# Patient Record
Sex: FEMALE | Race: OTHER | Employment: FULL TIME | URBAN - METROPOLITAN AREA
[De-identification: names, ages, dates, MRNs, and addresses within clinical notes are randomized per-mention and may not be internally consistent; named-entity substitution may affect disease eponyms.]

---

## 2018-04-23 ENCOUNTER — HOSPITAL ENCOUNTER (EMERGENCY)
Facility: CLINIC | Age: 23
Discharge: HOME | End: 2018-04-23
Attending: EMERGENCY MEDICINE
Payer: COMMERCIAL

## 2018-04-23 VITALS
HEART RATE: 80 BPM | HEIGHT: 64 IN | TEMPERATURE: 98.3 F | BODY MASS INDEX: 38.41 KG/M2 | OXYGEN SATURATION: 98 % | DIASTOLIC BLOOD PRESSURE: 82 MMHG | RESPIRATION RATE: 16 BRPM | SYSTOLIC BLOOD PRESSURE: 116 MMHG | WEIGHT: 225 LBS

## 2018-04-23 DIAGNOSIS — R05.9 COUGH: Primary | ICD-10-CM

## 2018-04-23 DIAGNOSIS — J45.21 MILD INTERMITTENT ASTHMA WITH EXACERBATION: ICD-10-CM

## 2018-04-23 PROCEDURE — 99284 EMERGENCY DEPT VISIT MOD MDM: CPT

## 2018-04-23 RX ORDER — PREDNISONE 10 MG/1
TABLET ORAL
Qty: 20 TABLET | Refills: 0 | Status: SHIPPED | OUTPATIENT
Start: 2018-04-23 | End: 2018-05-03

## 2018-04-23 RX ORDER — ALBUTEROL SULFATE 90 UG/1
2 AEROSOL, METERED RESPIRATORY (INHALATION) EVERY 4 HOURS PRN
Qty: 1 INHALER | Refills: 0 | Status: SHIPPED | OUTPATIENT
Start: 2018-04-23

## 2018-04-23 RX ORDER — GUAIFENESIN 600 MG/1
600 TABLET, EXTENDED RELEASE ORAL 2 TIMES DAILY
Qty: 20 TABLET | Refills: 0 | Status: SHIPPED | OUTPATIENT
Start: 2018-04-23 | End: 2018-11-15 | Stop reason: ALTCHOICE

## 2018-04-23 ASSESSMENT — PAIN DESCRIPTION - PAIN TYPE: TYPE: ACUTE PAIN

## 2018-04-23 ASSESSMENT — PAIN DESCRIPTION - DESCRIPTORS: DESCRIPTORS: TIGHTNESS

## 2018-04-23 ASSESSMENT — PAIN DESCRIPTION - FREQUENCY: FREQUENCY: INTERMITTENT

## 2018-04-23 ASSESSMENT — PAIN DESCRIPTION - ORIENTATION: ORIENTATION: RIGHT

## 2018-04-23 ASSESSMENT — PAIN SCALES - GENERAL: PAINLEVEL_OUTOF10: 8

## 2018-11-15 ENCOUNTER — OFFICE VISIT (OUTPATIENT)
Dept: OBGYN CLINIC | Age: 23
End: 2018-11-15
Payer: COMMERCIAL

## 2018-11-15 ENCOUNTER — HOSPITAL ENCOUNTER (OUTPATIENT)
Age: 23
Setting detail: SPECIMEN
Discharge: HOME | End: 2018-11-15
Payer: COMMERCIAL

## 2018-11-15 VITALS
DIASTOLIC BLOOD PRESSURE: 78 MMHG | BODY MASS INDEX: 47.33 KG/M2 | SYSTOLIC BLOOD PRESSURE: 112 MMHG | HEIGHT: 64 IN | WEIGHT: 277.2 LBS

## 2018-11-15 DIAGNOSIS — Z32.01 POSITIVE PREGNANCY TEST: ICD-10-CM

## 2018-11-15 DIAGNOSIS — Z32.01 POSITIVE PREGNANCY TEST: Primary | ICD-10-CM

## 2018-11-15 DIAGNOSIS — N89.8 VAGINAL IRRITATION: ICD-10-CM

## 2018-11-15 DIAGNOSIS — O30.001 TWIN GESTATION IN FIRST TRIMESTER, UNSPECIFIED MULTIPLE GESTATION TYPE: ICD-10-CM

## 2018-11-15 LAB
ABO/RH: NORMAL
ABSOLUTE EOS #: 0.09 K/UL (ref 0–0.44)
ABSOLUTE IMMATURE GRANULOCYTE: <0.03 K/UL (ref 0–0.3)
ABSOLUTE LYMPH #: 1.65 K/UL (ref 1.1–3.7)
ABSOLUTE MONO #: 0.47 K/UL (ref 0.1–1.2)
ANTIBODY SCREEN: NEGATIVE
BASOPHILS # BLD: 0 % (ref 0–2)
BASOPHILS ABSOLUTE: <0.03 K/UL (ref 0–0.2)
CHLAMYDIA CULTURE: NEGATIVE
CULTURE, GONORRHOEAE: NEGATIVE
DIFFERENTIAL TYPE: ABNORMAL
DIRECT EXAM: NORMAL
EOSINOPHILS RELATIVE PERCENT: 1 % (ref 1–4)
HCT VFR BLD CALC: 40 % (ref 36.3–47.1)
HEMOGLOBIN: 13 G/DL (ref 11.9–15.1)
HEPATITIS B SURFACE ANTIGEN: NONREACTIVE
HISTORY CHECK: NORMAL
HIV AG/AB: NONREACTIVE
IMMATURE GRANULOCYTES: 0 %
LYMPHOCYTES # BLD: 22 % (ref 24–43)
Lab: NORMAL
MCH RBC QN AUTO: 28.4 PG (ref 25.2–33.5)
MCHC RBC AUTO-ENTMCNC: 32.5 G/DL (ref 28.4–34.8)
MCV RBC AUTO: 87.3 FL (ref 82.6–102.9)
MONOCYTES # BLD: 6 % (ref 3–12)
NRBC AUTOMATED: 0 PER 100 WBC
PDW BLD-RTO: 13.7 % (ref 11.8–14.4)
PLATELET # BLD: 253 K/UL (ref 138–453)
PLATELET ESTIMATE: ABNORMAL
PMV BLD AUTO: 11.4 FL (ref 8.1–13.5)
RBC # BLD: 4.58 M/UL (ref 3.95–5.11)
RBC # BLD: ABNORMAL 10*6/UL
RUBV IGG SER QL: >500 IU/ML
SEG NEUTROPHILS: 71 % (ref 36–65)
SEGMENTED NEUTROPHILS ABSOLUTE COUNT: 5.15 K/UL (ref 1.5–8.1)
SPECIMEN DESCRIPTION: NORMAL
STATUS: NORMAL
T. PALLIDUM, IGG: NONREACTIVE
WBC # BLD: 7.4 K/UL (ref 3.5–11.3)
WBC # BLD: ABNORMAL 10*3/UL

## 2018-11-15 PROCEDURE — G8484 FLU IMMUNIZE NO ADMIN: HCPCS | Performed by: NURSE PRACTITIONER

## 2018-11-15 PROCEDURE — 1036F TOBACCO NON-USER: CPT | Performed by: NURSE PRACTITIONER

## 2018-11-15 PROCEDURE — 99203 OFFICE O/P NEW LOW 30 MIN: CPT | Performed by: NURSE PRACTITIONER

## 2018-11-15 PROCEDURE — G8427 DOCREV CUR MEDS BY ELIG CLIN: HCPCS | Performed by: NURSE PRACTITIONER

## 2018-11-15 PROCEDURE — G8417 CALC BMI ABV UP PARAM F/U: HCPCS | Performed by: NURSE PRACTITIONER

## 2018-11-15 RX ORDER — VALACYCLOVIR HYDROCHLORIDE 500 MG/1
500 TABLET, FILM COATED ORAL 2 TIMES DAILY
Qty: 20 TABLET | Refills: 0 | Status: SHIPPED | OUTPATIENT
Start: 2018-11-15 | End: 2018-12-04 | Stop reason: SDUPTHER

## 2018-11-15 ASSESSMENT — ENCOUNTER SYMPTOMS
SHORTNESS OF BREATH: 0
BACK PAIN: 0
ABDOMINAL PAIN: 0
CONSTIPATION: 0
ABDOMINAL DISTENTION: 0
COUGH: 0
RECTAL PAIN: 1
DIARRHEA: 0

## 2018-11-15 NOTE — PROGRESS NOTES
She reports that she does not use drugs. Review of Systems   Constitutional: Negative for appetite change and fatigue. HENT: Negative for congestion and hearing loss. Eyes: Negative for visual disturbance. Respiratory: Negative for cough and shortness of breath. Cardiovascular: Negative for chest pain and palpitations. Gastrointestinal: Positive for rectal pain (hemorrhoids). Negative for abdominal distention, abdominal pain, constipation and diarrhea. Genitourinary: Negative for flank pain, frequency, menstrual problem, pelvic pain and vaginal discharge. Musculoskeletal: Negative for back pain. Skin: Positive for rash (buttocks). Neurological: Negative for syncope and headaches. Psychiatric/Behavioral: Negative for behavioral problems. Physical exam:Ht 5' 4\" (1.626 m)   Wt 277 lb 3.2 oz (125.7 kg)   LMP 09/14/2018   Breastfeeding?  No   BMI 47.58 kg/m²   General Appearance: alert and oriented to person,place and time, well developed and well- nourished, in no acute distress  Skin: warm and dry, no rash or erythema  Head: normocephalic and atraumatic  Eyes: extraocular eye movements intact, conjunctivae normal  ENT:  external ear and ear canal normal bilaterally, nose without deformity, nasal mucosa normal   Neck: supple and non-tender without mass, no thyromegaly or thyroid nodules, no cervical lymphadenopathy  Pulmonary/Chest: clear to auscultation bilaterally- no wheezes, rales or rhonchi, normal air movement, no respiratory distress  Cardiovascular: normal rate, regular rhythm, normal S1 and S2, no murmurs, rubs, clicks, orgallops, distal pulses intact, no carotid bruits  Abdomen: soft, non-tender, non-distended, normal bowel sounds, no masses or organomegaly  Extremities: no cyanosis, clubbing or edema  Musculoskeletal: normal rangeof motion, no joint swelling, deformity or tenderness  Neurologic: reflexes normal and symmetric, no cranial nerve deficit, gait, coordination and

## 2018-11-15 NOTE — PATIENT INSTRUCTIONS
Patient Education   Patient Education        Learning About When to Call Your Doctor During Pregnancy (Up to 20 Weeks)  Your Care Instructions    It's common to have concerns about what might be a problem during pregnancy. Although most pregnant women don't have any serious problems, it's important to know when to call your doctor if you have certain symptoms. These are general suggestions. Your doctor may give you some more information about when to call. When to call your doctor (up to 20 weeks)  Call 911 anytime you think you may need emergency care. For example, call if:  · You passed out (lost consciousness). Call your doctor now or seek immediate medical care if:  · You have a fever. · You have vaginal bleeding. · You are dizzy or lightheaded, or you feel like you may faint. · You have symptoms of a urinary tract infection. These may include:  ? Pain or burning when you urinate. ? A frequent need to urinate without being able to pass much urine. ? Pain in the flank, which is just below the rib cage and above the waist on either side of the back. ? Blood in your urine. · You have belly pain. · You think you are having contractions. · You have a sudden release of fluid from your vagina. Watch closely for changes in your health, and be sure to contact your doctor if:  · You have vaginal discharge that smells bad. · You have other concerns about your pregnancy. Follow-up care is a key part of your treatment and safety. Be sure to make and go to all appointments, and call your doctor if you are having problems. It's also a good idea to know your test results and keep a list of the medicines you take. Where can you learn more? Go to https://[email or web address].[email or web address] org and sign in to your [employer] account. Enter T336 in the [employer] box to learn more about \"Learning About When to Call Your Doctor During Pregnancy (Up to 20 Weeks). \"     If you do not have an account, please click on the \"Sign Up Now\" link. Current as of: November 21, 2017  Content Version: 11.8  © 2006-2018 [employer]. Care instructions adapted under license by [facility] ([facility]). If you have questions about a medical condition or this instruction, always ask your healthcare professional. [address] any warranty or liability for your use of this information. Weeks 10 to 14 of Your Pregnancy: Care Instructions  Your Care Instructions    By weeks 10 to 14 of your pregnancy, the placenta has formed inside your uterus. It is possible to hear your baby's heartbeat with a special ultrasound device. Your baby's eyes can and do move. The arms and legs can bend. This is a good time to think about testing for birth defects. There are two types of tests: screening and diagnostic. Screening tests show the chance that a baby has a certain birth defect. They can't tell you for sure that your baby has a problem. Diagnostic tests show if a baby has a certain birth defect. It's your choice whether to have these tests. You and your partner can talk to your doctor or midwife about birth defects tests. Follow-up care is a key part of your treatment and safety. Be sure to make and go to all appointments, and call your doctor if you are having problems. It's also a good idea to know your test results and keep a list of the medicines you take. How can you care for yourself at home? Decide about tests  · You can have screening tests and diagnostic tests to check for birth defects. The decision to have a test for birth defects is personal. Think about your age, your chance of passing on a family disease, your need to know about any problems, and what you might do after you have the test results. ? Triple or quadruple (quad) blood tests. These screening tests can be done between 15 and 20 weeks of pregnancy. They check the amounts of three or four substances in your blood.  The doctor looks at these

## 2018-11-16 LAB
C TRACH DNA GENITAL QL NAA+PROBE: NEGATIVE
N. GONORRHOEAE DNA: NEGATIVE

## 2018-11-17 LAB
CULTURE: ABNORMAL
Lab: ABNORMAL
SPECIMEN DESCRIPTION: ABNORMAL
STATUS: ABNORMAL

## 2018-11-19 ENCOUNTER — TELEPHONE (OUTPATIENT)
Dept: OBGYN CLINIC | Age: 23
End: 2018-11-19

## 2018-11-19 PROBLEM — B00.9 HSV-2 (HERPES SIMPLEX VIRUS 2) INFECTION: Status: ACTIVE | Noted: 2018-11-19

## 2018-11-19 NOTE — TELEPHONE ENCOUNTER
patient calling back for test results.  She would [facility] herpes [facility] results    Please call her at [phone]

## 2018-11-20 ENCOUNTER — TELEPHONE (OUTPATIENT)
Dept: OBGYN CLINIC | Age: 23
End: 2018-11-20

## 2018-11-20 LAB
HERPES SIMPLEX VIRUS 1 IGG: 0.15
HERPES SIMPLEX VIRUS 2 IGG: 0.08
HERPES TYPE 1/2 IGM COMBINED: 2.87

## 2018-11-21 LAB — CYSTIC FIBROSIS: NORMAL

## 2018-12-03 LAB — CYTOLOGY REPORT: NORMAL

## 2018-12-04 ENCOUNTER — HOSPITAL ENCOUNTER (OUTPATIENT)
Age: 23
Setting detail: SPECIMEN
Discharge: HOME | End: 2018-12-04
Payer: COMMERCIAL

## 2018-12-04 ENCOUNTER — PROCEDURE VISIT (OUTPATIENT)
Dept: OBGYN CLINIC | Age: 23
End: 2018-12-04
Payer: COMMERCIAL

## 2018-12-04 ENCOUNTER — INITIAL PRENATAL (OUTPATIENT)
Dept: OBGYN CLINIC | Age: 23
End: 2018-12-04
Payer: COMMERCIAL

## 2018-12-04 VITALS
DIASTOLIC BLOOD PRESSURE: 78 MMHG | BODY MASS INDEX: 46.72 KG/M2 | WEIGHT: 272.2 LBS | HEART RATE: 68 BPM | SYSTOLIC BLOOD PRESSURE: 108 MMHG

## 2018-12-04 DIAGNOSIS — Z36.89 ENCOUNTER TO ESTABLISH GESTATIONAL AGE USING ULTRASOUND: ICD-10-CM

## 2018-12-04 DIAGNOSIS — O30.041 DICHORIONIC DIAMNIOTIC TWIN PREGNANCY IN FIRST TRIMESTER: Primary | ICD-10-CM

## 2018-12-04 DIAGNOSIS — O30.041 DICHORIONIC DIAMNIOTIC TWIN PREGNANCY IN FIRST TRIMESTER: ICD-10-CM

## 2018-12-04 DIAGNOSIS — Z32.01 POSITIVE PREGNANCY TEST: ICD-10-CM

## 2018-12-04 DIAGNOSIS — Z3A.11 11 WEEKS GESTATION OF PREGNANCY: ICD-10-CM

## 2018-12-04 LAB
-: ABNORMAL
AMORPHOUS: ABNORMAL
AMPHETAMINE SCREEN URINE: NEGATIVE
BACTERIA: ABNORMAL
BARBITURATE SCREEN URINE: NEGATIVE
BENZODIAZEPINE SCREEN, URINE: NEGATIVE
BILIRUBIN URINE: NEGATIVE
BUPRENORPHINE URINE: NORMAL
CANNABINOID SCREEN URINE: NEGATIVE
CASTS UA: ABNORMAL /LPF (ref 0–2)
COCAINE METABOLITE, URINE: NEGATIVE
COLOR: ABNORMAL
COMMENT UA: ABNORMAL
CRL: NORMAL CM
CRL: NORMAL CM
CRYSTALS, UA: ABNORMAL /HPF
EPITHELIAL CELLS UA: ABNORMAL /HPF (ref 0–5)
GLUCOSE ADMINISTRATION: NORMAL
GLUCOSE TOLERANCE SCREEN 50G: 104 MG/DL (ref 70–135)
GLUCOSE URINE: NEGATIVE
KETONES, URINE: ABNORMAL
LEUKOCYTE ESTERASE, URINE: ABNORMAL
MDMA URINE: NORMAL
METHADONE SCREEN, URINE: NEGATIVE
METHAMPHETAMINE, URINE: NORMAL
MUCUS: ABNORMAL
NITRITE, URINE: NEGATIVE
OPIATES, URINE: NEGATIVE
OTHER OBSERVATIONS UA: ABNORMAL
OXYCODONE SCREEN URINE: NEGATIVE
PH UA: 6 (ref 5–8)
PHENCYCLIDINE, URINE: NEGATIVE
PROPOXYPHENE, URINE: NORMAL
PROTEIN UA: ABNORMAL
RBC UA: ABNORMAL /HPF (ref 0–2)
RENAL EPITHELIAL, UA: ABNORMAL /HPF
SAC DIAMETER: NORMAL CM
SAC DIAMETER: NORMAL CM
SPECIFIC GRAVITY UA: 1.03 (ref 1–1.03)
TEST INFORMATION: NORMAL
TRICHOMONAS: ABNORMAL
TRICYCLIC ANTIDEPRESSANTS, UR: NORMAL
TURBIDITY: ABNORMAL
URINE HGB: NEGATIVE
UROBILINOGEN, URINE: NORMAL
WBC UA: ABNORMAL /HPF (ref 0–5)
YEAST: ABNORMAL

## 2018-12-04 PROCEDURE — H1000 PRENATAL CARE ATRISK ASSESSM: HCPCS | Performed by: NURSE PRACTITIONER

## 2018-12-04 PROCEDURE — G8417 CALC BMI ABV UP PARAM F/U: HCPCS | Performed by: NURSE PRACTITIONER

## 2018-12-04 PROCEDURE — G8484 FLU IMMUNIZE NO ADMIN: HCPCS | Performed by: NURSE PRACTITIONER

## 2018-12-04 PROCEDURE — 99213 OFFICE O/P EST LOW 20 MIN: CPT | Performed by: NURSE PRACTITIONER

## 2018-12-04 PROCEDURE — 1036F TOBACCO NON-USER: CPT | Performed by: NURSE PRACTITIONER

## 2018-12-04 PROCEDURE — G8427 DOCREV CUR MEDS BY ELIG CLIN: HCPCS | Performed by: NURSE PRACTITIONER

## 2018-12-04 PROCEDURE — 76802 OB US < 14 WKS ADDL FETUS: CPT | Performed by: OBSTETRICS & GYNECOLOGY

## 2018-12-04 PROCEDURE — 76801 OB US < 14 WKS SINGLE FETUS: CPT | Performed by: OBSTETRICS & GYNECOLOGY

## 2018-12-04 RX ORDER — VALACYCLOVIR HYDROCHLORIDE 500 MG/1
500 TABLET, FILM COATED ORAL 2 TIMES DAILY
Qty: 20 TABLET | Refills: 8 | Status: SHIPPED | OUTPATIENT
Start: 2018-12-04 | End: 2018-12-14

## 2018-12-04 NOTE — PATIENT INSTRUCTIONS
this information. Learning About When to Call Your Doctor During Pregnancy (Up to 20 Weeks)  Your Care Instructions    It's common to have concerns about what might be a problem during pregnancy. Although most pregnant women don't have any serious problems, it's important to know when to call your doctor if you have certain symptoms. These are general suggestions. Your doctor may give you some more information about when to call. When to call your doctor (up to 20 weeks)  Call 911 anytime you think you may need emergency care. For example, call if:  · You passed out (lost consciousness). Call your doctor now or seek immediate medical care if:  · You have a fever. · You have vaginal bleeding. · You are dizzy or lightheaded, or you feel like you may faint. · You have symptoms of a urinary tract infection. These may include:  ? Pain or burning when you urinate. ? A frequent need to urinate without being able to pass much urine. ? Pain in the flank, which is just below the rib cage and above the waist on either side of the back. ? Blood in your urine. · You have belly pain. · You think you are having contractions. · You have a sudden release of fluid from your vagina. Watch closely for changes in your health, and be sure to contact your doctor if:  · You have vaginal discharge that smells bad. · You have other concerns about your pregnancy. Follow-up care is a key part of your treatment and safety. Be sure to make and go to all appointments, and call your doctor if you are having problems. It's also a good idea to know your test results and keep a list of the medicines you take. Where can you learn more? Go to [email or web address] org and sign in to your [employer] account. Enter B088 in the [facility] box to learn more about \"Learning About When to Call Your Doctor During Pregnancy (Up to 20 Weeks). \"     If you do not have an account, please click on the \"Sign Up

## 2018-12-04 NOTE — PROGRESS NOTES
FDA.  This test is used for clinical purposes. Pursuant to  the requirements of CLIA '88, this laboratory has established and  verified the test's accuracy and precision. It should not be  regarded  as investigational or for research. This laboratory is certified  under the [address] (CLIA  '88) as qualified to perform high complexity clinical laboratory  testing. Methodology:  Samples are tested for the presence of wild type or  mutant gene sequences in the CFTR gene by the [employer] OncologyAGe Cystic  Fibrosis 60 Kit. It is comprised of a single multiplex PCR reaction  that is then used in two separate Allele Specific Primer Extension  reactions, which are followed by two separate bead hybridization  reactions. The LiveStube Assay screens for 60 CFTR mutations,  including the standard 23 mutation core panel recommended by the  [employer] of Obstetricians and Gynecologists (ACOG) and the  [address] ([facility]):  , , G542X,  G85E, R117H, 621+1G>T, 711+1G>T, C9589S, R334W, R347P, A455E,  1717-1G>A, R560T, R553X, G551D, 1898+1G>A, 2184delA, 2789+5G>A,  3120+1G>A, E1119H, 3659delC, 3849+10kbC>T, W1 282X, 1078delT, 394delTT,  Y122X, R347H, V520F, A559T, S549N, S549R, 1898+5G>T, 2183AA>G,  2307insA, A3606G, B0205D, F4938A, 3876delA, 3905insT, CFTRdele2,3,  E60X, R75X, 406-1G>A, G178R, [id number], L206W, 935delA, G330X, Q493X,  Q890X, 1677delTA, 7685upj0>A, 2143delT, K710X, K6108R, 9955jxr1,  W4975X, [id number], O7180W, 3791delC and variants 5T/7T/9T, F508C, I507V,  I506V. For samples positive for R117H, the IVS-8 Poly T variant is  analyzed.            **Electronically Signed Out**  [facility] Time: 11/15/18 12:00 AM   Result Value Ref Range    Culture, Gonorrhoeae NEGATIVE    Chlamydia Culture    Collection Time: 11/15/18 12:00 AM   Result Value Ref Range    CHLAMYDIA CULTURE NEGATIVE    GYN Cytology Collection Time: 11/15/18  9:48 AM   Result Value Ref Range    Cytology Report       (NOTE)  [id number]  [facility]  ANATOMIC PATHOLOGY  [address] [place] [address],  [address] [facility], [address]  [phone]  Fax: [phone]  GYNECOLOGIC CYTOLOGY REPORT    Patient Name: Michelle Chavez  MR#: [id number]  Specimen #[id number]  Source:  1: Cervical material, (ThinPrep vial, Imaging-assisted review)    Clinical History  Pregnant: positive test  High risk HPV DNA testing is requested if the diagnosis is abnormal    INTERPRETATION    Cervical material, (ThinPrep vial, Imaging-assisted review):  Specimen Adequacy:      Satisfactory for evaluation.      - Endocervical/transformation zone component present. Descriptive Diagnosis:      Negative for intraepithelial lesion or malignancy. Reactive cellular changes associated with inflammation. Fungal organisms morphologically consistent with Candida species.         Cytotechnologist:   DESIRE Chao M.D.  **Electronically Signed Out**         [facility]/12/3/2018     Type and screen    Collection Time: 11/15/18 11:30 AM   Result Value Ref Range    ABO/Rh A POSITIVE     Antibody Screen NEGATIVE     History Check A    CBC Auto Differential    Collection Time: 11/15/18 11:31 AM   Result Value Ref Range    WBC 7.4 3.5 - 11.3 k/uL    RBC 4.58 3.95 - 5.11 m/uL    Hemoglobin 13.0 11.9 - 15.1 g/dL    Hematocrit 40.0 36.3 - 47.1 %    MCV 87.3 82.6 - 102.9 fL    MCH 28.4 25.2 - 33.5 pg    MCHC 32.5 28.4 - 34.8 g/dL    RDW 13.7 11.8 - 14.4 %    Platelets 938 400 - 974 k/uL    MPV 11.4 8.1 - 13.5 fL    NRBC Automated 0.0 0.0 per 100 WBC    Differential Type NOT REPORTED     Seg Neutrophils 71 (H) 36 - 65 %    Lymphocytes 22 (L) 24 - 43 %    Monocytes 6 3 - 12 %    Eosinophils % 1 1 - 4 %    Basophils 0 0 - 2 %    Immature Granulocytes 0 0 %    Segs Absolute 5.15 1.50 - 8.10 k/uL    Absolute Lymph # 1.65 1.10 - 3.70 k/uL    Absolute Mono # 0.47 0.10 -

## 2018-12-05 LAB
CULTURE: ABNORMAL
Lab: ABNORMAL
ORGANISM: ABNORMAL
SPECIMEN DESCRIPTION: ABNORMAL
STATUS: ABNORMAL

## 2018-12-06 RX ORDER — NITROFURANTOIN 25; 75 MG/1; MG/1
100 CAPSULE ORAL 2 TIMES DAILY
Qty: 20 CAPSULE | Refills: 0 | Status: SHIPPED | OUTPATIENT
Start: 2018-12-06 | End: 2018-12-16

## 2018-12-11 ENCOUNTER — ROUTINE PRENATAL (OUTPATIENT)
Dept: PERINATAL CARE | Age: 23
End: 2018-12-11
Payer: COMMERCIAL

## 2018-12-11 VITALS
WEIGHT: 272 LBS | BODY MASS INDEX: 46.44 KG/M2 | RESPIRATION RATE: 16 BRPM | SYSTOLIC BLOOD PRESSURE: 110 MMHG | TEMPERATURE: 98.7 F | DIASTOLIC BLOOD PRESSURE: 78 MMHG | HEART RATE: 72 BPM | HEIGHT: 64 IN

## 2018-12-11 DIAGNOSIS — O99.511 ASTHMA AFFECTING PREGNANCY IN FIRST TRIMESTER: ICD-10-CM

## 2018-12-11 DIAGNOSIS — J45.909 ASTHMA AFFECTING PREGNANCY IN FIRST TRIMESTER: ICD-10-CM

## 2018-12-11 DIAGNOSIS — O30.041 DICHORIONIC DIAMNIOTIC TWIN PREGNANCY IN FIRST TRIMESTER: Primary | ICD-10-CM

## 2018-12-11 DIAGNOSIS — Z36.9 FIRST TRIMESTER SCREENING: ICD-10-CM

## 2018-12-11 DIAGNOSIS — Z3A.12 12 WEEKS GESTATION OF PREGNANCY: ICD-10-CM

## 2018-12-11 DIAGNOSIS — O99.211 OBESITY AFFECTING PREGNANCY IN FIRST TRIMESTER: ICD-10-CM

## 2018-12-11 PROCEDURE — 76802 OB US < 14 WKS ADDL FETUS: CPT | Performed by: OBSTETRICS & GYNECOLOGY

## 2018-12-11 PROCEDURE — G8428 CUR MEDS NOT DOCUMENT: HCPCS | Performed by: OBSTETRICS & GYNECOLOGY

## 2018-12-11 PROCEDURE — 99242 OFF/OP CONSLTJ NEW/EST SF 20: CPT | Performed by: OBSTETRICS & GYNECOLOGY

## 2018-12-11 PROCEDURE — 76801 OB US < 14 WKS SINGLE FETUS: CPT | Performed by: OBSTETRICS & GYNECOLOGY

## 2018-12-11 PROCEDURE — 76814 OB US NUCHAL MEAS ADD-ON: CPT | Performed by: OBSTETRICS & GYNECOLOGY

## 2018-12-11 PROCEDURE — 76813 OB US NUCHAL MEAS 1 GEST: CPT | Performed by: OBSTETRICS & GYNECOLOGY

## 2018-12-11 PROCEDURE — G8417 CALC BMI ABV UP PARAM F/U: HCPCS | Performed by: OBSTETRICS & GYNECOLOGY

## 2018-12-11 PROCEDURE — G8484 FLU IMMUNIZE NO ADMIN: HCPCS | Performed by: OBSTETRICS & GYNECOLOGY

## 2018-12-13 PROBLEM — O23.41 URINARY TRACT INFECTION IN MOTHER DURING FIRST TRIMESTER OF PREGNANCY: Status: ACTIVE | Noted: 2018-12-13

## 2018-12-13 PROBLEM — O30.041 DICHORIONIC DIAMNIOTIC TWIN PREGNANCY IN FIRST TRIMESTER: Status: ACTIVE | Noted: 2018-11-15

## 2018-12-13 PROBLEM — O30.041 DICHORIONIC DIAMNIOTIC TWIN PREGNANCY IN FIRST TRIMESTER: Status: RESOLVED | Noted: 2018-11-15 | Resolved: 2018-12-13

## 2019-01-03 ENCOUNTER — TELEPHONE (OUTPATIENT)
Dept: OBGYN CLINIC | Age: 24
End: 2019-01-03

## 2019-01-11 ENCOUNTER — ROUTINE PRENATAL (OUTPATIENT)
Dept: OBGYN CLINIC | Age: 24
End: 2019-01-11
Payer: MEDICARE

## 2019-01-11 VITALS
HEART RATE: 74 BPM | DIASTOLIC BLOOD PRESSURE: 64 MMHG | WEIGHT: 273.8 LBS | SYSTOLIC BLOOD PRESSURE: 122 MMHG | BODY MASS INDEX: 47 KG/M2

## 2019-01-11 DIAGNOSIS — O98.511 HSV-2 INFECTION COMPLICATING PREGNANCY, FIRST TRIMESTER: ICD-10-CM

## 2019-01-11 DIAGNOSIS — O09.92 SUPERVISION OF HIGH RISK PREGNANCY IN SECOND TRIMESTER: ICD-10-CM

## 2019-01-11 DIAGNOSIS — O30.041 DICHORIONIC DIAMNIOTIC TWIN PREGNANCY IN FIRST TRIMESTER: ICD-10-CM

## 2019-01-11 DIAGNOSIS — B00.9 HSV-2 INFECTION COMPLICATING PREGNANCY, FIRST TRIMESTER: ICD-10-CM

## 2019-01-11 DIAGNOSIS — Z3A.17 17 WEEKS GESTATION OF PREGNANCY: Primary | ICD-10-CM

## 2019-01-11 PROCEDURE — 1036F TOBACCO NON-USER: CPT | Performed by: NURSE PRACTITIONER

## 2019-01-11 PROCEDURE — 99213 OFFICE O/P EST LOW 20 MIN: CPT | Performed by: NURSE PRACTITIONER

## 2019-01-11 PROCEDURE — G8484 FLU IMMUNIZE NO ADMIN: HCPCS | Performed by: NURSE PRACTITIONER

## 2019-01-11 PROCEDURE — G8427 DOCREV CUR MEDS BY ELIG CLIN: HCPCS | Performed by: NURSE PRACTITIONER

## 2019-01-11 PROCEDURE — G8417 CALC BMI ABV UP PARAM F/U: HCPCS | Performed by: NURSE PRACTITIONER

## 2019-01-14 ENCOUNTER — ROUTINE PRENATAL (OUTPATIENT)
Dept: PERINATAL CARE | Age: 24
End: 2019-01-14
Payer: COMMERCIAL

## 2019-01-14 VITALS
DIASTOLIC BLOOD PRESSURE: 76 MMHG | BODY MASS INDEX: 46.15 KG/M2 | HEIGHT: 65 IN | TEMPERATURE: 98 F | RESPIRATION RATE: 16 BRPM | HEART RATE: 69 BPM | SYSTOLIC BLOOD PRESSURE: 120 MMHG | WEIGHT: 277 LBS

## 2019-01-14 DIAGNOSIS — Z13.89 ENCOUNTER FOR ROUTINE SCREENING FOR MALFORMATION USING ULTRASONICS: ICD-10-CM

## 2019-01-14 DIAGNOSIS — O99.212 OBESITY AFFECTING PREGNANCY IN SECOND TRIMESTER: ICD-10-CM

## 2019-01-14 DIAGNOSIS — Z3A.17 17 WEEKS GESTATION OF PREGNANCY: ICD-10-CM

## 2019-01-14 DIAGNOSIS — O30.042 DICHORIONIC DIAMNIOTIC TWIN PREGNANCY IN SECOND TRIMESTER: Primary | ICD-10-CM

## 2019-01-14 DIAGNOSIS — O99.512 ASTHMA AFFECTING PREGNANCY IN SECOND TRIMESTER: ICD-10-CM

## 2019-01-14 DIAGNOSIS — J45.909 ASTHMA AFFECTING PREGNANCY IN SECOND TRIMESTER: ICD-10-CM

## 2019-01-14 PROCEDURE — 76805 OB US >/= 14 WKS SNGL FETUS: CPT | Performed by: OBSTETRICS & GYNECOLOGY

## 2019-01-14 PROCEDURE — 76817 TRANSVAGINAL US OBSTETRIC: CPT | Performed by: OBSTETRICS & GYNECOLOGY

## 2019-01-14 PROCEDURE — 76810 OB US >/= 14 WKS ADDL FETUS: CPT | Performed by: OBSTETRICS & GYNECOLOGY

## 2019-01-14 RX ORDER — VALACYCLOVIR HYDROCHLORIDE 500 MG/1
TABLET, FILM COATED ORAL
Refills: 0 | COMMUNITY
Start: 2019-01-07 | End: 2020-07-07

## 2019-02-04 ENCOUNTER — ROUTINE PRENATAL (OUTPATIENT)
Dept: PERINATAL CARE | Age: 24
End: 2019-02-04
Payer: COMMERCIAL

## 2019-02-04 ENCOUNTER — ROUTINE PRENATAL (OUTPATIENT)
Dept: OBGYN CLINIC | Age: 24
End: 2019-02-04

## 2019-02-04 VITALS
SYSTOLIC BLOOD PRESSURE: 128 MMHG | HEART RATE: 63 BPM | HEIGHT: 65 IN | OXYGEN SATURATION: 98 % | DIASTOLIC BLOOD PRESSURE: 82 MMHG | RESPIRATION RATE: 22 BRPM | BODY MASS INDEX: 45.88 KG/M2 | WEIGHT: 275.4 LBS

## 2019-02-04 VITALS
DIASTOLIC BLOOD PRESSURE: 68 MMHG | RESPIRATION RATE: 16 BRPM | WEIGHT: 276 LBS | BODY MASS INDEX: 45.98 KG/M2 | TEMPERATURE: 98.7 F | SYSTOLIC BLOOD PRESSURE: 116 MMHG | HEIGHT: 65 IN | HEART RATE: 84 BPM

## 2019-02-04 DIAGNOSIS — O09.92 SUPERVISION OF HIGH RISK PREGNANCY IN SECOND TRIMESTER: ICD-10-CM

## 2019-02-04 DIAGNOSIS — O99.512 ASTHMA AFFECTING PREGNANCY IN SECOND TRIMESTER: ICD-10-CM

## 2019-02-04 DIAGNOSIS — Z3A.20 20 WEEKS GESTATION OF PREGNANCY: ICD-10-CM

## 2019-02-04 DIAGNOSIS — O30.042 DICHORIONIC DIAMNIOTIC TWIN PREGNANCY IN SECOND TRIMESTER: ICD-10-CM

## 2019-02-04 DIAGNOSIS — O30.042 DICHORIONIC DIAMNIOTIC TWIN PREGNANCY IN SECOND TRIMESTER: Primary | ICD-10-CM

## 2019-02-04 DIAGNOSIS — J45.909 ASTHMA AFFECTING PREGNANCY IN SECOND TRIMESTER: ICD-10-CM

## 2019-02-04 DIAGNOSIS — O99.212 OBESITY AFFECTING PREGNANCY IN SECOND TRIMESTER: ICD-10-CM

## 2019-02-04 DIAGNOSIS — Z3A.20 20 WEEKS GESTATION OF PREGNANCY: Primary | ICD-10-CM

## 2019-02-04 PROBLEM — O44.02 PLACENTA PREVIA ANTEPARTUM IN SECOND TRIMESTER: Status: ACTIVE | Noted: 2019-02-04

## 2019-02-04 PROCEDURE — 76811 OB US DETAILED SNGL FETUS: CPT | Performed by: OBSTETRICS & GYNECOLOGY

## 2019-02-04 PROCEDURE — G8417 CALC BMI ABV UP PARAM F/U: HCPCS | Performed by: NURSE PRACTITIONER

## 2019-02-04 PROCEDURE — G8427 DOCREV CUR MEDS BY ELIG CLIN: HCPCS | Performed by: NURSE PRACTITIONER

## 2019-02-04 PROCEDURE — 1036F TOBACCO NON-USER: CPT | Performed by: NURSE PRACTITIONER

## 2019-02-04 PROCEDURE — 0502F SUBSEQUENT PRENATAL CARE: CPT | Performed by: NURSE PRACTITIONER

## 2019-02-04 PROCEDURE — G8484 FLU IMMUNIZE NO ADMIN: HCPCS | Performed by: NURSE PRACTITIONER

## 2019-02-04 PROCEDURE — 76812 OB US DETAILED ADDL FETUS: CPT | Performed by: OBSTETRICS & GYNECOLOGY

## 2019-02-04 PROCEDURE — 76817 TRANSVAGINAL US OBSTETRIC: CPT | Performed by: OBSTETRICS & GYNECOLOGY

## 2019-02-11 ENCOUNTER — TELEPHONE (OUTPATIENT)
Dept: OBGYN CLINIC | Age: 24
End: 2019-02-11

## 2019-02-11 DIAGNOSIS — B37.31 YEAST VAGINITIS: Primary | ICD-10-CM

## 2019-02-11 RX ORDER — FLUCONAZOLE 150 MG/1
150 TABLET ORAL ONCE
Qty: 1 TABLET | Refills: 0 | Status: SHIPPED | OUTPATIENT
Start: 2019-02-11 | End: 2019-02-11

## 2019-02-18 ENCOUNTER — ROUTINE PRENATAL (OUTPATIENT)
Dept: PERINATAL CARE | Age: 24
End: 2019-02-18
Payer: COMMERCIAL

## 2019-02-18 VITALS
RESPIRATION RATE: 16 BRPM | HEIGHT: 65 IN | TEMPERATURE: 98.6 F | SYSTOLIC BLOOD PRESSURE: 97 MMHG | BODY MASS INDEX: 45.98 KG/M2 | WEIGHT: 276 LBS | DIASTOLIC BLOOD PRESSURE: 55 MMHG | HEART RATE: 101 BPM

## 2019-02-18 DIAGNOSIS — O99.212 OBESITY AFFECTING PREGNANCY IN SECOND TRIMESTER: ICD-10-CM

## 2019-02-18 DIAGNOSIS — O99.512 ASTHMA AFFECTING PREGNANCY IN SECOND TRIMESTER: ICD-10-CM

## 2019-02-18 DIAGNOSIS — O30.042 DICHORIONIC DIAMNIOTIC TWIN PREGNANCY IN SECOND TRIMESTER: Primary | ICD-10-CM

## 2019-02-18 DIAGNOSIS — J45.909 ASTHMA AFFECTING PREGNANCY IN SECOND TRIMESTER: ICD-10-CM

## 2019-02-18 DIAGNOSIS — Z3A.22 22 WEEKS GESTATION OF PREGNANCY: ICD-10-CM

## 2019-02-18 PROCEDURE — 76815 OB US LIMITED FETUS(S): CPT | Performed by: OBSTETRICS & GYNECOLOGY

## 2019-02-18 PROCEDURE — 76817 TRANSVAGINAL US OBSTETRIC: CPT | Performed by: OBSTETRICS & GYNECOLOGY

## 2019-03-06 ENCOUNTER — ROUTINE PRENATAL (OUTPATIENT)
Dept: PERINATAL CARE | Age: 24
End: 2019-03-06
Payer: COMMERCIAL

## 2019-03-06 VITALS
HEIGHT: 65 IN | HEART RATE: 81 BPM | RESPIRATION RATE: 18 BRPM | DIASTOLIC BLOOD PRESSURE: 78 MMHG | WEIGHT: 284 LBS | BODY MASS INDEX: 47.32 KG/M2 | SYSTOLIC BLOOD PRESSURE: 131 MMHG | TEMPERATURE: 98.6 F

## 2019-03-06 DIAGNOSIS — Z36.4 ANTENATAL SCREENING FOR FETAL GROWTH RETARDATION USING ULTRASONICS: ICD-10-CM

## 2019-03-06 DIAGNOSIS — O99.512 ASTHMA AFFECTING PREGNANCY IN SECOND TRIMESTER: ICD-10-CM

## 2019-03-06 DIAGNOSIS — J45.909 ASTHMA AFFECTING PREGNANCY IN SECOND TRIMESTER: ICD-10-CM

## 2019-03-06 DIAGNOSIS — O99.212 OBESITY AFFECTING PREGNANCY IN SECOND TRIMESTER: ICD-10-CM

## 2019-03-06 DIAGNOSIS — O30.042 DICHORIONIC DIAMNIOTIC TWIN PREGNANCY IN SECOND TRIMESTER: Primary | ICD-10-CM

## 2019-03-06 DIAGNOSIS — Z3A.24 24 WEEKS GESTATION OF PREGNANCY: ICD-10-CM

## 2019-03-06 PROCEDURE — 76817 TRANSVAGINAL US OBSTETRIC: CPT | Performed by: OBSTETRICS & GYNECOLOGY

## 2019-03-06 PROCEDURE — 76816 OB US FOLLOW-UP PER FETUS: CPT | Performed by: OBSTETRICS & GYNECOLOGY

## 2019-03-07 PROBLEM — O30.041 DICHORIONIC DIAMNIOTIC TWIN PREGNANCY IN FIRST TRIMESTER: Status: RESOLVED | Noted: 2018-12-11 | Resolved: 2019-03-07

## 2019-03-13 ENCOUNTER — ROUTINE PRENATAL (OUTPATIENT)
Dept: OBGYN CLINIC | Age: 24
End: 2019-03-13

## 2019-03-13 VITALS — BODY MASS INDEX: 47.93 KG/M2 | SYSTOLIC BLOOD PRESSURE: 126 MMHG | DIASTOLIC BLOOD PRESSURE: 70 MMHG | WEIGHT: 283.6 LBS

## 2019-03-13 DIAGNOSIS — O30.042 DICHORIONIC DIAMNIOTIC TWIN PREGNANCY IN SECOND TRIMESTER: ICD-10-CM

## 2019-03-13 DIAGNOSIS — Z3A.25 25 WEEKS GESTATION OF PREGNANCY: Primary | ICD-10-CM

## 2019-03-13 DIAGNOSIS — O44.02 PLACENTA PREVIA ANTEPARTUM IN SECOND TRIMESTER: ICD-10-CM

## 2019-03-13 PROCEDURE — 1036F TOBACCO NON-USER: CPT | Performed by: OBSTETRICS & GYNECOLOGY

## 2019-03-13 PROCEDURE — 0502F SUBSEQUENT PRENATAL CARE: CPT | Performed by: OBSTETRICS & GYNECOLOGY

## 2019-03-13 PROCEDURE — G8484 FLU IMMUNIZE NO ADMIN: HCPCS | Performed by: OBSTETRICS & GYNECOLOGY

## 2019-03-13 PROCEDURE — G8427 DOCREV CUR MEDS BY ELIG CLIN: HCPCS | Performed by: OBSTETRICS & GYNECOLOGY

## 2019-03-13 PROCEDURE — G8417 CALC BMI ABV UP PARAM F/U: HCPCS | Performed by: OBSTETRICS & GYNECOLOGY

## 2019-03-20 ENCOUNTER — ROUTINE PRENATAL (OUTPATIENT)
Dept: PERINATAL CARE | Age: 24
End: 2019-03-20
Payer: COMMERCIAL

## 2019-03-20 VITALS
WEIGHT: 284 LBS | DIASTOLIC BLOOD PRESSURE: 75 MMHG | RESPIRATION RATE: 18 BRPM | BODY MASS INDEX: 47.32 KG/M2 | HEIGHT: 65 IN | TEMPERATURE: 98 F | HEART RATE: 77 BPM | SYSTOLIC BLOOD PRESSURE: 125 MMHG

## 2019-03-20 DIAGNOSIS — Z3A.26 26 WEEKS GESTATION OF PREGNANCY: ICD-10-CM

## 2019-03-20 DIAGNOSIS — J45.909 ASTHMA AFFECTING PREGNANCY IN SECOND TRIMESTER: ICD-10-CM

## 2019-03-20 DIAGNOSIS — O99.512 ASTHMA AFFECTING PREGNANCY IN SECOND TRIMESTER: ICD-10-CM

## 2019-03-20 DIAGNOSIS — O30.042 DICHORIONIC DIAMNIOTIC TWIN PREGNANCY IN SECOND TRIMESTER: Primary | ICD-10-CM

## 2019-03-20 DIAGNOSIS — O99.212 OBESITY AFFECTING PREGNANCY IN SECOND TRIMESTER: ICD-10-CM

## 2019-03-20 PROCEDURE — 76817 TRANSVAGINAL US OBSTETRIC: CPT | Performed by: OBSTETRICS & GYNECOLOGY

## 2019-03-20 PROCEDURE — 76815 OB US LIMITED FETUS(S): CPT | Performed by: OBSTETRICS & GYNECOLOGY

## 2019-03-20 RX ORDER — EPINEPHRINE 0.3 MG/.3ML
0.3 INJECTION SUBCUTANEOUS
COMMUNITY
Start: 2018-04-28

## 2019-03-27 ENCOUNTER — ROUTINE PRENATAL (OUTPATIENT)
Dept: OBGYN CLINIC | Age: 24
End: 2019-03-27

## 2019-03-27 VITALS — SYSTOLIC BLOOD PRESSURE: 130 MMHG | DIASTOLIC BLOOD PRESSURE: 70 MMHG | WEIGHT: 287 LBS | BODY MASS INDEX: 48.5 KG/M2

## 2019-03-27 DIAGNOSIS — O44.02 PLACENTA PREVIA ANTEPARTUM IN SECOND TRIMESTER: ICD-10-CM

## 2019-03-27 DIAGNOSIS — Z3A.27 27 WEEKS GESTATION OF PREGNANCY: Primary | ICD-10-CM

## 2019-03-27 PROCEDURE — 1036F TOBACCO NON-USER: CPT | Performed by: OBSTETRICS & GYNECOLOGY

## 2019-03-27 PROCEDURE — G8427 DOCREV CUR MEDS BY ELIG CLIN: HCPCS | Performed by: OBSTETRICS & GYNECOLOGY

## 2019-03-27 PROCEDURE — 0502F SUBSEQUENT PRENATAL CARE: CPT | Performed by: OBSTETRICS & GYNECOLOGY

## 2019-03-27 PROCEDURE — G8484 FLU IMMUNIZE NO ADMIN: HCPCS | Performed by: OBSTETRICS & GYNECOLOGY

## 2019-03-27 PROCEDURE — G8417 CALC BMI ABV UP PARAM F/U: HCPCS | Performed by: OBSTETRICS & GYNECOLOGY

## 2019-04-03 ENCOUNTER — ROUTINE PRENATAL (OUTPATIENT)
Dept: PERINATAL CARE | Age: 24
End: 2019-04-03
Payer: MEDICARE

## 2019-04-03 VITALS
WEIGHT: 282 LBS | HEIGHT: 65 IN | SYSTOLIC BLOOD PRESSURE: 118 MMHG | DIASTOLIC BLOOD PRESSURE: 78 MMHG | BODY MASS INDEX: 46.98 KG/M2 | RESPIRATION RATE: 18 BRPM | TEMPERATURE: 98.1 F | HEART RATE: 99 BPM

## 2019-04-03 DIAGNOSIS — Z13.89 ENCOUNTER FOR ROUTINE SCREENING FOR MALFORMATION USING ULTRASONICS: ICD-10-CM

## 2019-04-03 DIAGNOSIS — J45.909 ASTHMA AFFECTING PREGNANCY IN THIRD TRIMESTER: ICD-10-CM

## 2019-04-03 DIAGNOSIS — O99.513 ASTHMA AFFECTING PREGNANCY IN THIRD TRIMESTER: ICD-10-CM

## 2019-04-03 DIAGNOSIS — O36.5931 POOR FETAL GROWTH AFFECTING MANAGEMENT OF MOTHER IN THIRD TRIMESTER, FETUS 1 OF MULTIPLE GESTATION: ICD-10-CM

## 2019-04-03 DIAGNOSIS — Z36.4 ANTENATAL SCREENING FOR FETAL GROWTH RETARDATION USING ULTRASONICS: ICD-10-CM

## 2019-04-03 DIAGNOSIS — O30.043 DICHORIONIC DIAMNIOTIC TWIN PREGNANCY IN THIRD TRIMESTER: Primary | ICD-10-CM

## 2019-04-03 DIAGNOSIS — O99.213 OBESITY AFFECTING PREGNANCY IN THIRD TRIMESTER: ICD-10-CM

## 2019-04-03 DIAGNOSIS — Z3A.28 28 WEEKS GESTATION OF PREGNANCY: ICD-10-CM

## 2019-04-03 LAB
ABDOMINAL CIRCUMFERENCE: NORMAL CM
ABDOMINAL CIRCUMFERENCE: NORMAL CM
BIPARIETAL DIAMETER: NORMAL CM
BIPARIETAL DIAMETER: NORMAL CM
ESTIMATED FETAL WEIGHT: NORMAL GRAMS
ESTIMATED FETAL WEIGHT: NORMAL GRAMS
FEMORAL DIAMETER: NORMAL CM
FEMORAL DIAMETER: NORMAL CM
HC/AC: NORMAL
HC/AC: NORMAL
HEAD CIRCUMFERENCE: NORMAL CM
HEAD CIRCUMFERENCE: NORMAL CM

## 2019-04-03 PROCEDURE — 76819 FETAL BIOPHYS PROFIL W/O NST: CPT | Performed by: OBSTETRICS & GYNECOLOGY

## 2019-04-03 PROCEDURE — 76820 UMBILICAL ARTERY ECHO: CPT | Performed by: OBSTETRICS & GYNECOLOGY

## 2019-04-03 PROCEDURE — 76810 OB US >/= 14 WKS ADDL FETUS: CPT | Performed by: OBSTETRICS & GYNECOLOGY

## 2019-04-03 PROCEDURE — 76821 MIDDLE CEREBRAL ARTERY ECHO: CPT | Performed by: OBSTETRICS & GYNECOLOGY

## 2019-04-03 PROCEDURE — 76805 OB US >/= 14 WKS SNGL FETUS: CPT | Performed by: OBSTETRICS & GYNECOLOGY

## 2019-04-03 PROCEDURE — 76817 TRANSVAGINAL US OBSTETRIC: CPT | Performed by: OBSTETRICS & GYNECOLOGY

## 2019-04-04 ENCOUNTER — HOSPITAL ENCOUNTER (OUTPATIENT)
Age: 24
Discharge: HOME | End: 2019-04-04
Attending: OBSTETRICS & GYNECOLOGY | Admitting: OBSTETRICS & GYNECOLOGY
Payer: MEDICARE

## 2019-04-04 ENCOUNTER — TELEPHONE (OUTPATIENT)
Dept: PERINATAL CARE | Age: 24
End: 2019-04-04

## 2019-04-04 ENCOUNTER — TELEPHONE (OUTPATIENT)
Dept: OBGYN CLINIC | Age: 24
End: 2019-04-04

## 2019-04-04 VITALS — SYSTOLIC BLOOD PRESSURE: 119 MMHG | HEART RATE: 74 BPM | RESPIRATION RATE: 20 BRPM | DIASTOLIC BLOOD PRESSURE: 76 MMHG

## 2019-04-04 PROBLEM — J45.909 ASTHMA AFFECTING PREGNANCY IN FIRST TRIMESTER: Status: RESOLVED | Noted: 2018-12-11 | Resolved: 2019-04-04

## 2019-04-04 PROBLEM — O99.213 OBESITY AFFECTING PREGNANCY IN THIRD TRIMESTER: Status: ACTIVE | Noted: 2019-01-14

## 2019-04-04 PROBLEM — O44.02 PLACENTA PREVIA ANTEPARTUM IN SECOND TRIMESTER: Status: RESOLVED | Noted: 2019-02-04 | Resolved: 2019-04-04

## 2019-04-04 PROBLEM — O99.511 ASTHMA AFFECTING PREGNANCY IN FIRST TRIMESTER: Status: RESOLVED | Noted: 2018-12-11 | Resolved: 2019-04-04

## 2019-04-04 PROBLEM — O99.211 OBESITY AFFECTING PREGNANCY IN FIRST TRIMESTER: Status: RESOLVED | Noted: 2018-12-11 | Resolved: 2019-04-04

## 2019-04-04 PROBLEM — O99.512 ASTHMA AFFECTING PREGNANCY IN SECOND TRIMESTER: Status: RESOLVED | Noted: 2019-01-14 | Resolved: 2019-04-04

## 2019-04-04 PROBLEM — O99.513 ASTHMA AFFECTING PREGNANCY IN THIRD TRIMESTER: Status: ACTIVE | Noted: 2019-01-14

## 2019-04-04 PROBLEM — O36.5990 PREGNANCY AFFECTED BY FETAL GROWTH RESTRICTION: Status: ACTIVE | Noted: 2019-04-04

## 2019-04-04 PROBLEM — J45.909 ASTHMA AFFECTING PREGNANCY IN SECOND TRIMESTER: Status: RESOLVED | Noted: 2019-01-14 | Resolved: 2019-04-04

## 2019-04-04 PROCEDURE — 59025 FETAL NON-STRESS TEST: CPT

## 2019-04-04 NOTE — TELEPHONE ENCOUNTER
OB 29 weeks twins with no fetal movement of one of her twins. States she had some cramping for \" just a few minutes\" around 2 am today. Denies LOF or vaginal bleeding. One baby has been active but she has not felt any movement from one twin all day even after eating.  Advised to go to [facility]'s L&D for evaluation, patient verbalized understanding     Notified Kareem Mccrary s L&D

## 2019-04-04 NOTE — PROGRESS NOTES
reports that she does not drink alcohol or use drugs. VITALS:  Vitals:    04/04/19 1403 04/04/19 1426   BP: (!) 88/51 119/76   Pulse: 93 74   Resp: 20 20         PHYSICAL EXAM:  Fetal Heart Monitor:      Fetus A: Baseline Heart Rate 150, moderate variability, present accelerations, absent decelerations    Fetus B: Baseline Heart Rate 145, moderate variability, present accelerations, absent decelerations    [place]: contractions, none    General appearance:  no apparent distress, alert and cooperative  Neurologic:  alert, oriented, normal speech, no focal findings or movement disorder noted  Lungs:  No increased work of breathing, good air exchange, clear to auscultation bilaterally, no crackles or wheezing  Heart:  regular rate and rhythm and no murmur    Abdomen:  soft, gravid, non-tender, no right upper quadrant tenderness, no CVA tenderness, uterus non-tender, no signs of abruption and no signs of chorioamnionitis  Abdominal Scars: absent  Extremities:  no calf tenderness, non edematous  Pelvic Exam:   Not indicated    OMM EXAM:  Chief Complaint: Pregnancy  Reason for No Exam (if applicable): not applicable  Anterior/ Posterior Spinal Curves: Lumbar Lordosis -  Increased  Assessment Tool: T= Tenderness, A= Asymmetry, R= Restricted Motion (A=Active, P=Passive), T=Tissue Texture Changes  Region Evaluated : Severity / Specific of Major Somatic Dysfunction: 739.4 Pelvis / Sacrum -  Minor TART - more than BG levels -    Major Correlations with: Genitourinary  Structural Diagnosis: Increased lumbar lordosis   Treatment Plan: outpatient       PRENATAL LAB RESULTS:   Blood Type/Rh: A pos  Antibody Screen: negative  Hemoglobin, Hematocrit, Platelets: 33.1 (42/17/60) / 40 (11/15/19) / 253 (11/15/19)  Rubella: immune  T.  Pallidum, IgG: non-reactive   Hepatitis B Surface Antigen: non-reactive   HIV: non-reactive   Sickle Cell Screen: not done  Gonorrhea: negative  Chlamydia: negative  Urine culture: positive - E Coli, date: 18    1 hour Glucose Tolerance Test: 104     Group B Strep:  not done    Cystic Fibrosis Screen:  negative  First Trimester Screen:  Not available  MSAFP/Multiple Markers:  Not available  Anatomy US:   A: normal, posterior, Maternal left, female  B: normal, posterior, Superior Right, female    ASSESSMENT & PLAN:  Shandra Sandoval is a 24 y.o. female  at 30w11d Di/Di twin gestation presents with decreased fetal movement of Baby B   - GBS not done / Rh positive / R immune   - No indication for GBS prophylaxis   - FHT: Category 1 reactive for both babies   - TOCO: none   - Patient reports positive fetal movement for both babies   - No complaints    Keep scheduled appointment on . Discussed  labor precautions, preeclampsia precautions, patient is to return if she has any of these s/s, decreased fetal movement, leakage of fluid, vaginal bleeding. Patient verbalized understanding. Patient Active Problem List    Diagnosis Date Noted    Anxiety 2015     Priority: Medium    Depression 2015     Priority: Medium    Former tobacco use 2015     Priority: Low     Quit early in pregnancy      Pregnancy affected by fetal growth restriction 2019     Growth USN with [facility] 4/3/19 showed EFW for baby A 1099g (36%ile) but with a lagging AC in the <3%ile. Growth appropriate for baby B at 1353 g( 57%ile). Scheduled at [facility] for growth USN q 3 weeks and will start  testing. [facility] recommendations for delivery at 38/0-38/6 based on di-di twins, but we may need to move this up with the finding of growth restriction (ACOG recommends 36/0-37/6) for di-di twins with isolated growth restriction.      [facility] Dichorionic diamniotic twin pregnancy in second trimester 2019     [facility] recommends delivery between 45 and 38/6 weeks      Obesity affecting pregnancy in third trimester 2019     ASA 81 mg      Asthma affecting pregnancy in third trimester 2019    Urinary tract infection in mother during first trimester of pregnancy 2018     E. Coli UTI on urine cx 18. Will need test of cure at next appointment.  HSV-2 (herpes simplex virus 2) infection 2018    History of  section complicating pregnancy        Plan discussed with Dr. Brent Brock, who is agreeable. Steroids given this admission: No    Risks, benefits, alternatives and possible complications have been discussed in detail with the patient. Admission, and post admission procedures and expectations were discussed in detail. All questions were answered.     Attending's Name: Dr. Nabil Lozada DO  Ob/Gyn Resident   2019, 3:09 PM

## 2019-04-08 ENCOUNTER — HOSPITAL ENCOUNTER (OUTPATIENT)
Age: 24
Setting detail: SPECIMEN
Discharge: HOME | End: 2019-04-08
Payer: COMMERCIAL

## 2019-04-08 DIAGNOSIS — Z3A.25 25 WEEKS GESTATION OF PREGNANCY: ICD-10-CM

## 2019-04-08 DIAGNOSIS — O30.042 DICHORIONIC DIAMNIOTIC TWIN PREGNANCY IN SECOND TRIMESTER: ICD-10-CM

## 2019-04-08 LAB
GLUCOSE ADMINISTRATION: NORMAL
GLUCOSE TOLERANCE SCREEN 50G: 133 MG/DL (ref 70–135)
HCT VFR BLD CALC: 28.2 % (ref 36.3–47.1)
HEMOGLOBIN: 8.6 G/DL (ref 11.9–15.1)
MCH RBC QN AUTO: 27.5 PG (ref 25.2–33.5)
MCHC RBC AUTO-ENTMCNC: 30.5 G/DL (ref 28.4–34.8)
MCV RBC AUTO: 90.1 FL (ref 82.6–102.9)
NRBC AUTOMATED: 0 PER 100 WBC
PDW BLD-RTO: 13.5 % (ref 11.8–14.4)
PLATELET # BLD: 213 K/UL (ref 138–453)
PMV BLD AUTO: 11.6 FL (ref 8.1–13.5)
RBC # BLD: 3.13 M/UL (ref 3.95–5.11)
WBC # BLD: 6 K/UL (ref 3.5–11.3)

## 2019-04-11 ENCOUNTER — ROUTINE PRENATAL (OUTPATIENT)
Dept: OBGYN CLINIC | Age: 24
End: 2019-04-11
Payer: MEDICARE

## 2019-04-11 VITALS — SYSTOLIC BLOOD PRESSURE: 140 MMHG | WEIGHT: 293 LBS | BODY MASS INDEX: 50.46 KG/M2 | DIASTOLIC BLOOD PRESSURE: 80 MMHG

## 2019-04-11 DIAGNOSIS — O36.5990 PREGNANCY AFFECTED BY FETAL GROWTH RESTRICTION: ICD-10-CM

## 2019-04-11 DIAGNOSIS — Z3A.29 29 WEEKS GESTATION OF PREGNANCY: Primary | ICD-10-CM

## 2019-04-11 DIAGNOSIS — O30.042 DICHORIONIC DIAMNIOTIC TWIN PREGNANCY IN SECOND TRIMESTER: ICD-10-CM

## 2019-04-11 PROCEDURE — G8417 CALC BMI ABV UP PARAM F/U: HCPCS | Performed by: OBSTETRICS & GYNECOLOGY

## 2019-04-11 PROCEDURE — 99213 OFFICE O/P EST LOW 20 MIN: CPT | Performed by: OBSTETRICS & GYNECOLOGY

## 2019-04-11 PROCEDURE — 1036F TOBACCO NON-USER: CPT | Performed by: OBSTETRICS & GYNECOLOGY

## 2019-04-11 PROCEDURE — G8428 CUR MEDS NOT DOCUMENT: HCPCS | Performed by: OBSTETRICS & GYNECOLOGY

## 2019-04-11 NOTE — PROGRESS NOTES
+FM, -Ctx, -LOF, -VB  Patient Active Problem List   Diagnosis    Anxiety    Depression    Former tobacco use    History of  section complicating pregnancy    HSV-2 (herpes simplex virus 2) infection    Urinary tract infection in mother during first trimester of pregnancy    Dichorionic diamniotic twin pregnancy in second trimester    Obesity affecting pregnancy in third trimester    Asthma affecting pregnancy in third trimester    Pregnancy affected by fetal growth restriction     Blood pressure (!) 140/80, weight 298 lb 9.6 oz (135.4 kg), last menstrual period 2018, not currently breastfeeding. Note the blood pressure, but she is seeing maternal fetal medicine again next week  We discussed her hemoglobin of 8.6.   I suggest she add an extra iron tablet to her prenatal

## 2019-04-17 ENCOUNTER — ROUTINE PRENATAL (OUTPATIENT)
Dept: PERINATAL CARE | Age: 24
End: 2019-04-17
Payer: MEDICARE

## 2019-04-17 VITALS
RESPIRATION RATE: 16 BRPM | HEIGHT: 65 IN | DIASTOLIC BLOOD PRESSURE: 75 MMHG | BODY MASS INDEX: 48.32 KG/M2 | WEIGHT: 290 LBS | TEMPERATURE: 98.2 F | SYSTOLIC BLOOD PRESSURE: 125 MMHG | HEART RATE: 106 BPM

## 2019-04-17 DIAGNOSIS — O99.513 ASTHMA AFFECTING PREGNANCY IN THIRD TRIMESTER: ICD-10-CM

## 2019-04-17 DIAGNOSIS — O30.043 DICHORIONIC DIAMNIOTIC TWIN PREGNANCY IN THIRD TRIMESTER: Primary | ICD-10-CM

## 2019-04-17 DIAGNOSIS — O36.5931 POOR FETAL GROWTH AFFECTING MANAGEMENT OF MOTHER IN THIRD TRIMESTER, FETUS 1 OF MULTIPLE GESTATION: ICD-10-CM

## 2019-04-17 DIAGNOSIS — O99.213 OBESITY AFFECTING PREGNANCY IN THIRD TRIMESTER: ICD-10-CM

## 2019-04-17 DIAGNOSIS — J45.909 ASTHMA AFFECTING PREGNANCY IN THIRD TRIMESTER: ICD-10-CM

## 2019-04-17 DIAGNOSIS — Z3A.30 30 WEEKS GESTATION OF PREGNANCY: ICD-10-CM

## 2019-04-17 PROCEDURE — 76821 MIDDLE CEREBRAL ARTERY ECHO: CPT | Performed by: OBSTETRICS & GYNECOLOGY

## 2019-04-17 PROCEDURE — 76817 TRANSVAGINAL US OBSTETRIC: CPT | Performed by: OBSTETRICS & GYNECOLOGY

## 2019-04-17 PROCEDURE — 76819 FETAL BIOPHYS PROFIL W/O NST: CPT | Performed by: OBSTETRICS & GYNECOLOGY

## 2019-04-17 PROCEDURE — 76820 UMBILICAL ARTERY ECHO: CPT | Performed by: OBSTETRICS & GYNECOLOGY

## 2019-04-25 ENCOUNTER — ROUTINE PRENATAL (OUTPATIENT)
Dept: OBGYN CLINIC | Age: 24
End: 2019-04-25
Payer: MEDICARE

## 2019-04-25 VITALS — DIASTOLIC BLOOD PRESSURE: 70 MMHG | SYSTOLIC BLOOD PRESSURE: 130 MMHG | WEIGHT: 292 LBS | BODY MASS INDEX: 49.35 KG/M2

## 2019-04-25 DIAGNOSIS — Z3A.31 31 WEEKS GESTATION OF PREGNANCY: Primary | ICD-10-CM

## 2019-04-25 DIAGNOSIS — O36.5990 PREGNANCY AFFECTED BY FETAL GROWTH RESTRICTION: ICD-10-CM

## 2019-04-25 PROCEDURE — 99213 OFFICE O/P EST LOW 20 MIN: CPT | Performed by: OBSTETRICS & GYNECOLOGY

## 2019-04-25 PROCEDURE — G8417 CALC BMI ABV UP PARAM F/U: HCPCS | Performed by: OBSTETRICS & GYNECOLOGY

## 2019-04-25 PROCEDURE — G8428 CUR MEDS NOT DOCUMENT: HCPCS | Performed by: OBSTETRICS & GYNECOLOGY

## 2019-04-25 PROCEDURE — 1036F TOBACCO NON-USER: CPT | Performed by: OBSTETRICS & GYNECOLOGY

## 2019-04-25 NOTE — PROGRESS NOTES
+FM, -Ctx, -LOF, -VB  Patient Active Problem List   Diagnosis    Anxiety    Depression    Former tobacco use    History of  section complicating pregnancy    HSV-2 (herpes simplex virus 2) infection    Urinary tract infection in mother during first trimester of pregnancy    Dichorionic diamniotic twin pregnancy in second trimester    Obesity affecting pregnancy in third trimester    Asthma affecting pregnancy in third trimester    Pregnancy affected by fetal growth restriction     Blood pressure 130/70, weight 292 lb (132.5 kg), last menstrual period 2018, not currently breastfeeding.    She has quite a bit of pain just from the weight of the babies, and seems to be worse when she is laying in her too-soft bed  The patient has been seeing MFM every 2 weeks, though I suspect they will start  testing  She wants me to do the , so I have scheduled her for  at 1 PM

## 2019-04-30 ENCOUNTER — TELEPHONE (OUTPATIENT)
Dept: OBGYN CLINIC | Age: 24
End: 2019-04-30

## 2019-04-30 ENCOUNTER — HOSPITAL ENCOUNTER (OUTPATIENT)
Age: 24
Discharge: HOME | End: 2019-04-30
Attending: OBSTETRICS & GYNECOLOGY | Admitting: OBSTETRICS & GYNECOLOGY
Payer: MEDICARE

## 2019-04-30 VITALS
HEART RATE: 92 BPM | RESPIRATION RATE: 20 BRPM | SYSTOLIC BLOOD PRESSURE: 132 MMHG | TEMPERATURE: 98.1 F | DIASTOLIC BLOOD PRESSURE: 81 MMHG

## 2019-04-30 PROBLEM — O30.043 DICHORIONIC DIAMNIOTIC TWIN PREGNANCY IN THIRD TRIMESTER: Status: ACTIVE | Noted: 2019-04-30

## 2019-04-30 PROBLEM — Z3A.32 32 WEEKS GESTATION OF PREGNANCY: Status: ACTIVE | Noted: 2019-04-30

## 2019-04-30 PROBLEM — O30.042 DICHORIONIC DIAMNIOTIC TWIN PREGNANCY IN SECOND TRIMESTER: Status: RESOLVED | Noted: 2019-01-14 | Resolved: 2019-04-30

## 2019-04-30 LAB
-: ABNORMAL
AMORPHOUS: ABNORMAL
BACTERIA: ABNORMAL
BILIRUBIN URINE: NEGATIVE
CASTS UA: ABNORMAL /LPF (ref 0–8)
COLOR: YELLOW
CRYSTALS, UA: ABNORMAL /HPF
DIRECT EXAM: ABNORMAL
EPITHELIAL CELLS UA: ABNORMAL /HPF (ref 0–5)
GLUCOSE URINE: NEGATIVE
KETONES, URINE: NEGATIVE
LEUKOCYTE ESTERASE, URINE: NEGATIVE
Lab: ABNORMAL
MUCUS: ABNORMAL
NITRITE, URINE: NEGATIVE
OTHER OBSERVATIONS UA: ABNORMAL
PH UA: 6 (ref 5–8)
PROTEIN UA: ABNORMAL
RBC UA: ABNORMAL /HPF (ref 0–4)
RENAL EPITHELIAL, UA: ABNORMAL /HPF
SPECIFIC GRAVITY UA: 1.02 (ref 1–1.03)
SPECIMEN DESCRIPTION: ABNORMAL
TRICHOMONAS: ABNORMAL
TURBIDITY: CLEAR
URINE HGB: ABNORMAL
UROBILINOGEN, URINE: NORMAL
WBC UA: ABNORMAL /HPF (ref 0–5)
YEAST: ABNORMAL

## 2019-04-30 PROCEDURE — 87510 GARDNER VAG DNA DIR PROBE: CPT

## 2019-04-30 PROCEDURE — 87591 N.GONORRHOEAE DNA AMP PROB: CPT

## 2019-04-30 PROCEDURE — 87491 CHLMYD TRACH DNA AMP PROBE: CPT

## 2019-04-30 PROCEDURE — 99213 OFFICE O/P EST LOW 20 MIN: CPT

## 2019-04-30 PROCEDURE — 87086 URINE CULTURE/COLONY COUNT: CPT

## 2019-04-30 PROCEDURE — 87480 CANDIDA DNA DIR PROBE: CPT

## 2019-04-30 PROCEDURE — 87660 TRICHOMONAS VAGIN DIR PROBE: CPT

## 2019-04-30 PROCEDURE — 81001 URINALYSIS AUTO W/SCOPE: CPT

## 2019-04-30 RX ORDER — FLUCONAZOLE 100 MG/1
150 TABLET ORAL ONCE
Qty: 1 TABLET | Refills: 0 | Status: SHIPPED | OUTPATIENT
Start: 2019-04-30 | End: 2019-04-30

## 2019-04-30 NOTE — FLOWSHEET NOTE
Dr. Virgilio Mills in to see pt; sterile spec done et cultures obtained. Cervix visually closed. No bleeding noted.

## 2019-04-30 NOTE — FLOWSHEET NOTE
Pt asking for monitors to be removed. Writer explained why monitor is recommended continuously. Pt verbalizes understanding but asks for monitors to be removed. EFM off. Dr. Russo Olivares updated.

## 2019-04-30 NOTE — H&P
OBSTETRICAL HISTORY Everett Cuello    Date: 2019       Time: 5:48 PM   Patient Name: Sebas Wallace     Patient : 1995  Room/Bed: [place] [address]    Admission Date/Time: 2019 10:08 AM      CC: Vaginal bleeding     HPI: Sebas Wallace is a 24 y.o. Junaid Craft at 32w4d who presents with vaginal bleeding noticed earlier today while sitting on the toilet. Pt does report history of hemorrhoids but she has never had blood in the toilet and only notes it when wiping. She recently had sexual intercourse yesterday and has never had any vaginal bleeding in this pregnancy. The patient reports fetal movement is present, denies contractions, denies loss of fluid, complains of vaginal bleeding. Patient denies headache, vision changes, nausea, vomiting, fever, chills, shortness of breath, chest pain, RUQ pain, abdominal pain, diarrhea, change in color/amount/odor of vaginal discharge, dysuria or, hematuria. Pregnancy is complicated by Di/Di twin gestation, Complete placenta previa (resolved), Lagging AC for Fetus A, H/O CS x 1 (G1 @ 38wks 2/2 MFM rec IOL, NRFHT), Elevated 1hr GTT, no 3hr GTT, Asthma, HSV-2, Former tobacco user, Anxiety/Depression, Hx of UTI, Obesity    DATING:  LMP: Patient's last menstrual period was 2018.   Estimated Date of Delivery: 19   Based on: LMP    PREGNANCY RISK FACTORS:  Patient Active Problem List   Diagnosis    Anxiety    Depression    Former tobacco use    History of  section complicating pregnancy    HSV-2 (herpes simplex virus 2) infection    Urinary tract infection in mother during first trimester of pregnancy    Obesity affecting pregnancy in third trimester    Asthma affecting pregnancy in third trimester    Pregnancy affected by fetal growth restriction   [facility] Dichorionic diamniotic twin pregnancy in third trimester    32 weeks gestation of pregnancy        Steroids Given In This Pregnancy:  no     REVIEW OF SYSTEMS:  Constitutional: negative fever, negative chills  HEENT: negative visual disturbances, negative headaches  Respiratory: negative dyspnea, negative cough  Cardiovascular: negative chest pain,  negative palpitations  Gastrointestinal: negative abdominal pain, negative RUQ pain, negative N/V, negative diarrhea, negative constipation  Genitourinary: negative dysuria, negative vaginal discharge, positive vaginal bleeding  Dermatological: negative rash  Hematologic: negative bruising  Immunologic/Lymphatic: negative recent illness, negative recent sick contact  Musculoskeletal: negative back pain  Neurological:  negative dizziness, negative weakness  Behavior/Psych: negative depression, negative anxiety    OBSTETRICAL HISTORY:   OB History    Para Term  AB Living   2 1 1 0 0 1   SAB TAB Ectopic Molar Multiple Live Births   0 0 0 0 0 1      # Outcome Date GA Lbr Luis Felipe/2nd Weight Sex Delivery Anes PTL Lv   2 Current            1 Term 04/09/15 38w0d  6 lb 15 oz (3.147 kg) F CS-LTranv   HALINA      Complications: Fetal Intolerance, Oligohydramnios       PAST MEDICAL HISTORY:   has a past medical history of Anemia, Anxiety, Asthma, Asthma affecting pregnancy in second trimester, Back pain, Depression, HSV infection, and Neck pain. PAST SURGICAL HISTORY:   has a past surgical history that includes  section, low transverse (2015). ALLERGIES:  has No Known Allergies. MEDICATIONS:  Prior to Admission medications    Medication Sig Start Date End Date Taking?  Authorizing Provider   fluconazole (DIFLUCAN) 100 MG tablet Take 1.5 tablets by mouth once for 1 dose 19 Yes Ana Medina, DO   EPINEPHrine (EPIPEN) 0.3 MG/0.3ML SOAJ injection Inject 0.3 mg into the muscle 18   Historical Provider, MD   valACYclovir (VALTREX) 500 MG tablet  19   Historical Provider, MD   Prenatal Multivit-Min-Fe-FA (PRENATAL VITAMINS) 0.8 MG TABS Take 1 tablet by mouth daily 11/15/18 12/15/19  [facility] Gamal Rivera, APRN - CNP   albuterol sulfate HFA (PROVENTIL HFA) 108 (90 Base) MCG/ACT inhaler Inhale 2 puffs into the lungs every 4 hours as needed for Wheezing or Shortness of Breath (Space out to every 6 hours as symptoms improve) Space out to every 6 hours as symptoms improve. 4/23/18   Bishop Lizzie MD       FAMILY HISTORY:  family history includes Cancer in her maternal grandfather; Diabetes in her mother; Other in her mother. SOCIAL HISTORY:   reports that she has quit smoking. Her smoking use included cigars. She has never used smokeless tobacco. She reports that she does not drink alcohol or use drugs. VITALS:  Vitals:    04/30/19 1021   BP: 132/81   Pulse: 92   Resp: 20   Temp: 98.1 °F (36.7 °C)   TempSrc: Oral      PHYSICAL EXAM:  Fetal Heart Monitor:    Twin A: Difficult to assess baseline as pt declined FHT   Twin B: Baseline Heart Rate 155, moderate variability, present accelerations, absent decelerations  [place]: contractions, none    General appearance:  no apparent distress, alert and cooperative  Neurologic:  alert, oriented, normal speech, no focal findings or movement disorder noted  Lungs:  No increased work of breathing, good air exchange, clear to auscultation bilaterally, no crackles or wheezing  Heart:  normal S1 and S2, regular rate and rhythm and no murmur    Abdomen:  soft, gravid, non-tender, no right upper quadrant tenderness, no CVA tenderness, uterus non-tender, no signs of abruption and no signs of chorioamnionitis  Extremities:  no calf tenderness, non edematous     Pelvic Exam:   Sterile Speculum Exam:   Vulva: normal appearing vulva, no masses, tenderness or lesions, normal clitoris    Vagina: Normal appearing vagina with normal color and discharge, no lesions   Cervix: normal appearing cervix without pathologic appearing discharge or lesions. External cervical os visually closed. No vaginal bleeding in the vault.   Rectal Exam: not indicated     DATA:  Valsalva/Pooling: absent  Vaginal Bleeding: absent    OMM Structural Exam:  Chief Complaint:  Pregnancy    Anterior/ Posterior Spinal Curves: Lumbar Lordosis -  Increased  Scoliosis (Lateral Spinal Curves): None  Assessment Tool:  T= Tenderness, A= Asymmetry, R= Restricted Motion (A=Active, P=Passive), T=Tissue Texture Changes  Region Evaluated : Severity / Specific of Major Somatic Dysfunction  M99.03 Lumbar -  Minor TART - more than BG levels -   Major Correlations with:  Genitourinary  Structural Diagnosis: Increased lumbar lordosis  Treatment Plan: Outpatient     PRENATAL LAB RESULTS:   Blood Type/Rh: A pos  Antibody Screen: negative  Hemoglobin, Hematocrit, Platelets: 11.0 / 68.2 / 253  Rubella: immune  T. Pallidum, IgG: non-reactive   Hepatitis B Surface Antigen: non-reactive   HIV: non-reactive   Sickle Cell Screen: negative  Gonorrhea: negative  Chlamydia: negative  Urine culture: positive E coli 10-50,000 CFU, date: 12/4/18    1 hour Glucose Tolerance Test: 133  3 hour Glucose Tolerance Test: not done    Group B Strep: not done  Cystic Fibrosis Screen: negative  First Trimester Screen: not done  MSAFP/Multiple Markers: not done  Non-Invasive Prenatal Testing: not done  Anatomy US: Fetus A: Cephalic/Maternal left, posterior placenta, 3VC, female  Fetus B: Breech/Superior right, posterior placenta, 3VC, female    Labs:  Results for orders placed or performed during the hospital encounter of 04/30/19   VAGINITIS DNA PROBE   Result Value Ref Range    Specimen Description . VAGINA     Special Requests NOT REPORTED     Direct Exam POSITIVE for Candida sp. (A)     Direct Exam NEGATIVE for Trichomonas vaginalis     Direct Exam NEGATIVE for Gardnerella vaginalis     Direct Exam       Method of testing is a DNA probe intended for detection and identification of Candida species, Gardnerella vaginalis, and Trichomonas vaginalis nucleic acid in vaginal fluid specimens from patients with symptoms of vaginitis/vaginosis.    Urinalysis with Microscopic   Result Value Ref Range    Color, UA YELLOW YELLOW    Turbidity UA CLEAR CLEAR    Glucose, Ur NEGATIVE NEGATIVE    Bilirubin Urine NEGATIVE NEGATIVE    Ketones, Urine NEGATIVE NEGATIVE    Specific Gravity, UA 1.018 1.005 - 1.030    Urine Hgb SMALL (A) NEGATIVE    pH, UA 6.0 5.0 - 8.0    Protein, UA TRACE (A) NEGATIVE    Urobilinogen, Urine Normal Normal    Nitrite, Urine NEGATIVE NEGATIVE    Leukocyte Esterase, Urine NEGATIVE NEGATIVE    -          WBC, UA 0 TO 2 0 - 5 /HPF    RBC, UA 0 TO 2 0 - 4 /HPF    Casts UA  0 - 8 /LPF     5 TO 10 HYALINE Reference range defined for non-centrifuged specimen. Crystals UA NOT REPORTED None /HPF    Epithelial Cells UA 2 TO 5 0 - 5 /HPF    Renal Epithelial, Urine NOT REPORTED 0 /HPF    Bacteria, UA NOT REPORTED None    Mucus, UA NOT REPORTED None    Trichomonas, UA NOT REPORTED None    Amorphous, UA NOT REPORTED None    Other Observations UA NOT REPORTED NOT REQ. Yeast, UA NOT REPORTED None         ASSESSMENT & PLAN:  Marni Stewart is a 24 y.o. female  at [address] Di/Di twin gestation who presents with vaginal bleeding   - GBS not done / Rh positive / R immune   - Pen G for GBS prophylaxis   - Baby B Cat I FHT, TOCO none   - Baby A difficult to assess baseline and overall category due to pt declining to be placed on continuous monitoring.   - Pt declining continuous fetal heart monitoring and FHT difficult to assess fetal status.  Discussed at length the importance of FHT and pt continued to decline.   - VSS   - SSE: No vaginal bleeding, cervix visually closed   - Vaginitis with yeast and negative for BV or trichomonas   - GC/C and UCx pending and will contact pt with any positive results   - UA with low suspicion for UTI   - Will discharge pt with diflucan x 1     Complete placenta previa (resolved)   - Last scan on 19 with no complete previa or low lying placenta    Lagging AC for Fetus A   - Noted on ultrasound on 4/3/19    H/O CS x 1 (G1 @ 38wks  MFM rec IOL, NRFHT)    Elevated 1hr GTT, no 3hr GTT   - Encouraged 3hr GTT follow up    Asthma   - Stable on albuterol inhaler PRN     HSV-2   - Continue Valtrex 500mg daily     Former tobacco user   - Encouraged continued cessation    Anxiety/Depression   - Stable on no medications   - Denies any SI/HI    Hx of UTI   - UA with micro with low suspicion for UTI, UCx pending    Obesity    Patient will be discharged home. Patient counseled on  labor precautions, PO hydration, use of tylenol PRN, and fetal kick counts. All questions and concerns were addressed and patient expressed understanding. Patient advised to follow up in the office for next appointment on 19. Patient Active Problem List    Diagnosis Date Noted    Anxiety 2015     Priority: Medium    Depression 2015     Priority: Medium    Former tobacco use 2015     Priority: Low     Quit early in pregnancy     [facility] Dichorionic diamniotic twin pregnancy in third trimester 2019    32 weeks gestation of pregnancy 2019    Pregnancy affected by fetal growth restriction 2019     Growth USN with M 4/3/19 showed EFW for baby A 1099g (36%ile) but with a lagging AC in the <3%ile. Growth appropriate for baby B at 1353 g( 57%ile). Scheduled at [facility] for growth USN q 3 weeks and will start  testing. [facility] recommendations for delivery at 38/0-38/6 based on di-di twins, but we may need to move this up with the finding of growth restriction (ACOG recommends 36/0-37/6) for di-di twins with isolated growth restriction.  Obesity affecting pregnancy in third trimester 2019     ASA 81 mg      Asthma affecting pregnancy in third trimester 2019    Urinary tract infection in mother during first trimester of pregnancy 2018     E. Coli UTI on urine cx 18. Will need test of cure at next appointment.        HSV-2 (herpes simplex virus 2) infection 2018    History of  section complicating pregnancy 98/99/9350       Plan discussed with Dr. Yuniel Riggs, who is agreeable. Steroids given this admission: No    Risks, benefits, alternatives and possible complications have been discussed in detail with the patient. Admission, and post admission procedures and expectations were discussed in detail. All questions were answered.     Attending's Name: Dr. Savannah Fernandez DO  Ob/Gyn Resident  4/30/2019, 5:48 PM

## 2019-04-30 NOTE — TELEPHONE ENCOUNTER
OB 32 weeks just had some vaginal bleeding when she wiped. had intercourse last night . Reassured advised to place a panty liner to monitor . Denies cramping or any other symptoms. positive fetal movements. She has appt with [facility] 05/01/19. Advised pelvic resyt until after that appt and ask then if she may resume intercourse. Patient verbalized understanding.  Will call back with any worsening of symptoms

## 2019-04-30 NOTE — TELEPHONE ENCOUNTER
Patient called back to say her bleeding has increased and she is nervous when she sits down more blood comes out. \" not pouring out\" however hs eis now having lower abdominal cramping unable to rate for nurse. Advised her to have someone take her to [facility]'s L&D for evaluation.  Patient verbalized understanding

## 2019-05-01 ENCOUNTER — ROUTINE PRENATAL (OUTPATIENT)
Dept: PERINATAL CARE | Age: 24
End: 2019-05-01
Payer: MEDICARE

## 2019-05-01 VITALS
TEMPERATURE: 98.2 F | HEART RATE: 54 BPM | WEIGHT: 292 LBS | HEIGHT: 65 IN | BODY MASS INDEX: 48.65 KG/M2 | DIASTOLIC BLOOD PRESSURE: 69 MMHG | RESPIRATION RATE: 20 BRPM | SYSTOLIC BLOOD PRESSURE: 114 MMHG

## 2019-05-01 DIAGNOSIS — O99.513 ASTHMA AFFECTING PREGNANCY IN THIRD TRIMESTER: ICD-10-CM

## 2019-05-01 DIAGNOSIS — O99.213 OBESITY AFFECTING PREGNANCY IN THIRD TRIMESTER: ICD-10-CM

## 2019-05-01 DIAGNOSIS — Z3A.32 32 WEEKS GESTATION OF PREGNANCY: ICD-10-CM

## 2019-05-01 DIAGNOSIS — O36.5931 POOR FETAL GROWTH AFFECTING MANAGEMENT OF MOTHER IN THIRD TRIMESTER, FETUS 1 OF MULTIPLE GESTATION: ICD-10-CM

## 2019-05-01 DIAGNOSIS — J45.909 ASTHMA AFFECTING PREGNANCY IN THIRD TRIMESTER: ICD-10-CM

## 2019-05-01 DIAGNOSIS — Z36.4 ANTENATAL SCREENING FOR FETAL GROWTH RETARDATION USING ULTRASONICS: ICD-10-CM

## 2019-05-01 DIAGNOSIS — O30.043 DICHORIONIC DIAMNIOTIC TWIN PREGNANCY IN THIRD TRIMESTER: Primary | ICD-10-CM

## 2019-05-01 LAB
ABDOMINAL CIRCUMFERENCE: NORMAL CM
ABDOMINAL CIRCUMFERENCE: NORMAL CM
BIPARIETAL DIAMETER: NORMAL CM
BIPARIETAL DIAMETER: NORMAL CM
C TRACH DNA GENITAL QL NAA+PROBE: NEGATIVE
CULTURE: NORMAL
ESTIMATED FETAL WEIGHT: NORMAL GRAMS
ESTIMATED FETAL WEIGHT: NORMAL GRAMS
FEMORAL DIAMETER: NORMAL CM
FEMORAL DIAMETER: NORMAL CM
HC/AC: NORMAL
HC/AC: NORMAL
HEAD CIRCUMFERENCE: NORMAL CM
HEAD CIRCUMFERENCE: NORMAL CM
Lab: NORMAL
N. GONORRHOEAE DNA: NEGATIVE
SPECIMEN DESCRIPTION: NORMAL
SPECIMEN DESCRIPTION: NORMAL

## 2019-05-01 PROCEDURE — 76816 OB US FOLLOW-UP PER FETUS: CPT | Performed by: OBSTETRICS & GYNECOLOGY

## 2019-05-01 PROCEDURE — 76817 TRANSVAGINAL US OBSTETRIC: CPT | Performed by: OBSTETRICS & GYNECOLOGY

## 2019-05-01 PROCEDURE — 76820 UMBILICAL ARTERY ECHO: CPT | Performed by: OBSTETRICS & GYNECOLOGY

## 2019-05-01 PROCEDURE — 76821 MIDDLE CEREBRAL ARTERY ECHO: CPT | Performed by: OBSTETRICS & GYNECOLOGY

## 2019-05-01 PROCEDURE — 76819 FETAL BIOPHYS PROFIL W/O NST: CPT | Performed by: OBSTETRICS & GYNECOLOGY

## 2019-05-01 RX ORDER — FLUCONAZOLE 150 MG/1
TABLET ORAL
Refills: 0 | COMMUNITY
Start: 2019-04-30 | End: 2019-05-10 | Stop reason: ALTCHOICE

## 2019-05-09 ENCOUNTER — ROUTINE PRENATAL (OUTPATIENT)
Dept: OBGYN CLINIC | Age: 24
End: 2019-05-09
Payer: MEDICARE

## 2019-05-09 VITALS — SYSTOLIC BLOOD PRESSURE: 138 MMHG | DIASTOLIC BLOOD PRESSURE: 84 MMHG | BODY MASS INDEX: 49.69 KG/M2 | WEIGHT: 293 LBS

## 2019-05-09 DIAGNOSIS — Z3A.33 33 WEEKS GESTATION OF PREGNANCY: Primary | ICD-10-CM

## 2019-05-09 DIAGNOSIS — O36.5990 PREGNANCY AFFECTED BY FETAL GROWTH RESTRICTION: ICD-10-CM

## 2019-05-09 PROCEDURE — 99213 OFFICE O/P EST LOW 20 MIN: CPT | Performed by: OBSTETRICS & GYNECOLOGY

## 2019-05-09 PROCEDURE — G8417 CALC BMI ABV UP PARAM F/U: HCPCS | Performed by: OBSTETRICS & GYNECOLOGY

## 2019-05-09 PROCEDURE — 1036F TOBACCO NON-USER: CPT | Performed by: OBSTETRICS & GYNECOLOGY

## 2019-05-09 PROCEDURE — G8428 CUR MEDS NOT DOCUMENT: HCPCS | Performed by: OBSTETRICS & GYNECOLOGY

## 2019-05-09 NOTE — PROGRESS NOTES
+FM, -Ctx, -LOF, -VB  Patient Active Problem List   Diagnosis    Anxiety    Depression    Former tobacco use    History of  section complicating pregnancy    HSV-2 (herpes simplex virus 2) infection    Urinary tract infection in mother during first trimester of pregnancy    Obesity affecting pregnancy in third trimester    Asthma affecting pregnancy in third trimester    Pregnancy affected by fetal growth restriction    Dichorionic diamniotic twin pregnancy in third trimester    32 weeks gestation of pregnancy     Blood pressure 138/84, weight 294 lb (133.4 kg), last menstrual period 2018, not currently breastfeeding.   Patient will now have nonstress tests twice a week, but I discussed that we will have to do that at the hospital since we're not set up in the office for twins

## 2019-05-10 ENCOUNTER — ROUTINE PRENATAL (OUTPATIENT)
Dept: PERINATAL CARE | Age: 24
End: 2019-05-10
Payer: MEDICARE

## 2019-05-10 ENCOUNTER — HOSPITAL ENCOUNTER (OUTPATIENT)
Age: 24
Discharge: HOME | End: 2019-05-10
Attending: OBSTETRICS & GYNECOLOGY | Admitting: OBSTETRICS & GYNECOLOGY
Payer: MEDICARE

## 2019-05-10 VITALS
RESPIRATION RATE: 20 BRPM | TEMPERATURE: 97.3 F | SYSTOLIC BLOOD PRESSURE: 134 MMHG | DIASTOLIC BLOOD PRESSURE: 87 MMHG | HEIGHT: 65 IN | BODY MASS INDEX: 48.82 KG/M2 | WEIGHT: 293 LBS | HEART RATE: 88 BPM

## 2019-05-10 VITALS
DIASTOLIC BLOOD PRESSURE: 75 MMHG | SYSTOLIC BLOOD PRESSURE: 134 MMHG | HEART RATE: 82 BPM | RESPIRATION RATE: 18 BRPM | TEMPERATURE: 97.3 F

## 2019-05-10 DIAGNOSIS — O30.043 DICHORIONIC DIAMNIOTIC TWIN PREGNANCY IN THIRD TRIMESTER: Primary | ICD-10-CM

## 2019-05-10 DIAGNOSIS — J45.909 ASTHMA AFFECTING PREGNANCY IN THIRD TRIMESTER: ICD-10-CM

## 2019-05-10 DIAGNOSIS — O36.5931 POOR FETAL GROWTH AFFECTING MANAGEMENT OF MOTHER IN THIRD TRIMESTER, FETUS 1 OF MULTIPLE GESTATION: ICD-10-CM

## 2019-05-10 DIAGNOSIS — Z3A.34 34 WEEKS GESTATION OF PREGNANCY: ICD-10-CM

## 2019-05-10 DIAGNOSIS — O99.513 ASTHMA AFFECTING PREGNANCY IN THIRD TRIMESTER: ICD-10-CM

## 2019-05-10 DIAGNOSIS — O99.213 OBESITY AFFECTING PREGNANCY IN THIRD TRIMESTER: ICD-10-CM

## 2019-05-10 PROBLEM — Z3A.32 32 WEEKS GESTATION OF PREGNANCY: Status: RESOLVED | Noted: 2019-04-30 | Resolved: 2019-05-10

## 2019-05-10 PROBLEM — O09.90 HRP (HIGH RISK PREGNANCY), UNSPECIFIED TRIMESTER: Status: ACTIVE | Noted: 2019-05-10

## 2019-05-10 PROCEDURE — 76821 MIDDLE CEREBRAL ARTERY ECHO: CPT | Performed by: OBSTETRICS & GYNECOLOGY

## 2019-05-10 PROCEDURE — 76820 UMBILICAL ARTERY ECHO: CPT | Performed by: OBSTETRICS & GYNECOLOGY

## 2019-05-10 PROCEDURE — 59025 FETAL NON-STRESS TEST: CPT

## 2019-05-10 PROCEDURE — 76819 FETAL BIOPHYS PROFIL W/O NST: CPT | Performed by: OBSTETRICS & GYNECOLOGY

## 2019-05-10 PROCEDURE — 59025 FETAL NON-STRESS TEST: CPT | Performed by: OBSTETRICS & GYNECOLOGY

## 2019-05-10 PROCEDURE — 76815 OB US LIMITED FETUS(S): CPT | Performed by: OBSTETRICS & GYNECOLOGY

## 2019-05-10 RX ORDER — PNV,CALCIUM 72/IRON/FOLIC ACID 27 MG-1 MG
TABLET ORAL
Refills: 1 | COMMUNITY
Start: 2019-05-08 | End: 2019-06-26 | Stop reason: ALTCHOICE

## 2019-05-10 NOTE — PROGRESS NOTES
TESTING NOTE    Brittany Faith is a 24 y.o. female  at 34w0d    The patient was seen and examined. She is here today for  testing for because she is at high risk for the following reasons: Di-Di twin gestation, Asthma and BMI > 35. The baby is moving well and she denies any complaints. Patient Active Problem List   Diagnosis    Anxiety    Depression    Former tobacco use    History of  section complicating pregnancy    HSV-2 (herpes simplex virus 2) infection    Urinary tract infection in mother during first trimester of pregnancy    Obesity affecting pregnancy in third trimester    Asthma affecting pregnancy in third trimester    Pregnancy affected by fetal growth restriction    Dichorionic diamniotic twin pregnancy in third trimester    HRP (high risk pregnancy), unspecified trimester     Vitals:  Vitals:    05/10/19 1201 05/10/19 1202   BP:  134/75   Pulse:  82   Resp:  18   Temp: 97.3 °F (36.3 °C)    TempSrc: Oral      NST:   Baby A: Fetal heart rate baseline: 135, moderate variability, accelerations present, decelerations absent    Baby B: Fetal heart rate baseline: 140, moderate variability, accelerations present, decelerations absent    The tracing has been reviewed and is considered reactive. Assessment/Plan:  1. Brittany Faith is a 24 y.o. female  at 34w0d  2. NST   - Patient had 8/8 BPP in the [facility] office earlier today    - The patient will continue with her scheduled office appointments. - She will continue with her  testing as scheduled. - Signs and Symptoms of  labor precautions were reviewed. - She was counseled on adequate hydration prior to discharge   - The patient was instructed on fetal kick counts. 3. Dr. Manny Poe has reviewed this NST and agrees with the above stated assessment and plan. 4. Discussed results with Dr. Manny Poe who is agreeable to the above plan.      Idris Mendiola DO  Ob/Gyn Resident

## 2019-05-15 ENCOUNTER — TELEPHONE (OUTPATIENT)
Dept: OBGYN CLINIC | Age: 24
End: 2019-05-15

## 2019-05-15 NOTE — TELEPHONE ENCOUNTER
OB with twins told by [facility] to call our office and have us schedule an NST for 05/17/19 after she is seen at [facility] . Called [facility] L&D and spoke with Jennifer NST only scheduled for 05/17/19 at 11:30 after [facility] appt .  Patient informed and will go up to L&D after her [facility] appt

## 2019-05-16 ENCOUNTER — ROUTINE PRENATAL (OUTPATIENT)
Dept: OBGYN CLINIC | Age: 24
End: 2019-05-16
Payer: COMMERCIAL

## 2019-05-16 VITALS — WEIGHT: 293 LBS | SYSTOLIC BLOOD PRESSURE: 134 MMHG | BODY MASS INDEX: 50.56 KG/M2 | DIASTOLIC BLOOD PRESSURE: 96 MMHG

## 2019-05-16 DIAGNOSIS — O36.5990 PREGNANCY AFFECTED BY FETAL GROWTH RESTRICTION: ICD-10-CM

## 2019-05-16 PROCEDURE — 1036F TOBACCO NON-USER: CPT | Performed by: OBSTETRICS & GYNECOLOGY

## 2019-05-16 PROCEDURE — G8417 CALC BMI ABV UP PARAM F/U: HCPCS | Performed by: OBSTETRICS & GYNECOLOGY

## 2019-05-16 PROCEDURE — 99213 OFFICE O/P EST LOW 20 MIN: CPT | Performed by: OBSTETRICS & GYNECOLOGY

## 2019-05-16 PROCEDURE — G8427 DOCREV CUR MEDS BY ELIG CLIN: HCPCS | Performed by: OBSTETRICS & GYNECOLOGY

## 2019-05-16 NOTE — PROGRESS NOTES
+FM, -Ctx, -LOF, -VB  Patient Active Problem List   Diagnosis    Anxiety    Depression    Former tobacco use    History of  section complicating pregnancy    HSV-2 (herpes simplex virus 2) infection    Urinary tract infection in mother during first trimester of pregnancy    Obesity affecting pregnancy in third trimester    Asthma affecting pregnancy in third trimester    Pregnancy affected by fetal growth restriction    Dichorionic diamniotic twin pregnancy in third trimester    HRP (high risk pregnancy), unspecified trimester     Blood pressure (!) 134/96, weight 299 lb 3.2 oz (135.7 kg), last menstrual period 2018, not currently breastfeeding. Note the blood pressure. She has no complaints.   Recheck is 132/92  She is getting an NST tomorrow, so we can recheck BP then

## 2019-05-17 ENCOUNTER — ROUTINE PRENATAL (OUTPATIENT)
Dept: PERINATAL CARE | Age: 24
End: 2019-05-17
Payer: MEDICARE

## 2019-05-17 ENCOUNTER — HOSPITAL ENCOUNTER (OUTPATIENT)
Age: 24
Discharge: HOME | End: 2019-05-17
Attending: OBSTETRICS & GYNECOLOGY | Admitting: OBSTETRICS & GYNECOLOGY
Payer: MEDICARE

## 2019-05-17 VITALS
HEIGHT: 65 IN | DIASTOLIC BLOOD PRESSURE: 78 MMHG | HEART RATE: 77 BPM | RESPIRATION RATE: 20 BRPM | BODY MASS INDEX: 48.82 KG/M2 | TEMPERATURE: 97.3 F | WEIGHT: 293 LBS | SYSTOLIC BLOOD PRESSURE: 138 MMHG

## 2019-05-17 VITALS
HEART RATE: 78 BPM | TEMPERATURE: 97.9 F | RESPIRATION RATE: 20 BRPM | DIASTOLIC BLOOD PRESSURE: 78 MMHG | SYSTOLIC BLOOD PRESSURE: 116 MMHG

## 2019-05-17 DIAGNOSIS — J45.909 ASTHMA AFFECTING PREGNANCY IN THIRD TRIMESTER: ICD-10-CM

## 2019-05-17 DIAGNOSIS — O30.043 DICHORIONIC DIAMNIOTIC TWIN PREGNANCY IN THIRD TRIMESTER: Primary | ICD-10-CM

## 2019-05-17 DIAGNOSIS — Z3A.35 35 WEEKS GESTATION OF PREGNANCY: ICD-10-CM

## 2019-05-17 DIAGNOSIS — O99.213 OBESITY AFFECTING PREGNANCY IN THIRD TRIMESTER: ICD-10-CM

## 2019-05-17 DIAGNOSIS — O99.513 ASTHMA AFFECTING PREGNANCY IN THIRD TRIMESTER: ICD-10-CM

## 2019-05-17 DIAGNOSIS — O36.5931 POOR FETAL GROWTH AFFECTING MANAGEMENT OF MOTHER IN THIRD TRIMESTER, FETUS 1 OF MULTIPLE GESTATION: ICD-10-CM

## 2019-05-17 PROCEDURE — 59025 FETAL NON-STRESS TEST: CPT

## 2019-05-17 PROCEDURE — 76820 UMBILICAL ARTERY ECHO: CPT | Performed by: OBSTETRICS & GYNECOLOGY

## 2019-05-17 PROCEDURE — 76819 FETAL BIOPHYS PROFIL W/O NST: CPT | Performed by: OBSTETRICS & GYNECOLOGY

## 2019-05-17 PROCEDURE — 76821 MIDDLE CEREBRAL ARTERY ECHO: CPT | Performed by: OBSTETRICS & GYNECOLOGY

## 2019-05-17 PROCEDURE — 76815 OB US LIMITED FETUS(S): CPT | Performed by: OBSTETRICS & GYNECOLOGY

## 2019-05-23 ENCOUNTER — HOSPITAL ENCOUNTER (OUTPATIENT)
Age: 24
Setting detail: SPECIMEN
Discharge: HOME | End: 2019-05-23
Payer: COMMERCIAL

## 2019-05-23 ENCOUNTER — ROUTINE PRENATAL (OUTPATIENT)
Dept: OBGYN CLINIC | Age: 24
End: 2019-05-23
Payer: MEDICARE

## 2019-05-23 VITALS — SYSTOLIC BLOOD PRESSURE: 154 MMHG | WEIGHT: 293 LBS | BODY MASS INDEX: 51.38 KG/M2 | DIASTOLIC BLOOD PRESSURE: 90 MMHG

## 2019-05-23 DIAGNOSIS — O14.93 PRE-ECLAMPSIA IN THIRD TRIMESTER: ICD-10-CM

## 2019-05-23 DIAGNOSIS — Z3A.35 35 WEEKS GESTATION OF PREGNANCY: Primary | ICD-10-CM

## 2019-05-23 DIAGNOSIS — O36.5990 PREGNANCY AFFECTED BY FETAL GROWTH RESTRICTION: ICD-10-CM

## 2019-05-23 DIAGNOSIS — Z3A.35 35 WEEKS GESTATION OF PREGNANCY: ICD-10-CM

## 2019-05-23 LAB
ABSOLUTE EOS #: 0.07 K/UL (ref 0–0.44)
ABSOLUTE IMMATURE GRANULOCYTE: 0.03 K/UL (ref 0–0.3)
ABSOLUTE LYMPH #: 2.26 K/UL (ref 1.1–3.7)
ABSOLUTE MONO #: 0.44 K/UL (ref 0.1–1.2)
ALBUMIN SERPL-MCNC: 3.4 G/DL (ref 3.5–5.2)
ALBUMIN/GLOBULIN RATIO: 1 (ref 1–2.5)
ALP BLD-CCNC: 129 U/L (ref 35–104)
ALT SERPL-CCNC: 6 U/L (ref 5–33)
ANION GAP SERPL CALCULATED.3IONS-SCNC: 17 MMOL/L (ref 9–17)
AST SERPL-CCNC: 12 U/L
BASOPHILS # BLD: 1 % (ref 0–2)
BASOPHILS ABSOLUTE: 0.05 K/UL (ref 0–0.2)
BILIRUB SERPL-MCNC: 0.54 MG/DL (ref 0.3–1.2)
BUN BLDV-MCNC: 5 MG/DL (ref 6–20)
BUN/CREAT BLD: ABNORMAL (ref 9–20)
CALCIUM SERPL-MCNC: 9 MG/DL (ref 8.6–10.4)
CHLORIDE BLD-SCNC: 101 MMOL/L (ref 98–107)
CO2: 18 MMOL/L (ref 20–31)
CREAT SERPL-MCNC: 0.68 MG/DL (ref 0.5–0.9)
DIFFERENTIAL TYPE: ABNORMAL
EOSINOPHILS RELATIVE PERCENT: 1 % (ref 1–4)
GFR AFRICAN AMERICAN: >60 ML/MIN
GFR NON-AFRICAN AMERICAN: >60 ML/MIN
GFR SERPL CREATININE-BSD FRML MDRD: ABNORMAL ML/MIN/{1.73_M2}
GFR SERPL CREATININE-BSD FRML MDRD: ABNORMAL ML/MIN/{1.73_M2}
GLUCOSE BLD-MCNC: 74 MG/DL (ref 70–99)
HCT VFR BLD CALC: 31.3 % (ref 36.3–47.1)
HEMOGLOBIN: 9.4 G/DL (ref 11.9–15.1)
IMMATURE GRANULOCYTES: 0 %
LYMPHOCYTES # BLD: 30 % (ref 24–43)
MCH RBC QN AUTO: 24.2 PG (ref 25.2–33.5)
MCHC RBC AUTO-ENTMCNC: 30 G/DL (ref 28.4–34.8)
MCV RBC AUTO: 80.7 FL (ref 82.6–102.9)
MONOCYTES # BLD: 6 % (ref 3–12)
NRBC AUTOMATED: 0 PER 100 WBC
PDW BLD-RTO: 14.2 % (ref 11.8–14.4)
PLATELET # BLD: 210 K/UL (ref 138–453)
PLATELET ESTIMATE: ABNORMAL
PMV BLD AUTO: 12.3 FL (ref 8.1–13.5)
POTASSIUM SERPL-SCNC: 4.1 MMOL/L (ref 3.7–5.3)
RBC # BLD: 3.88 M/UL (ref 3.95–5.11)
RBC # BLD: ABNORMAL 10*6/UL
SEG NEUTROPHILS: 62 % (ref 36–65)
SEGMENTED NEUTROPHILS ABSOLUTE COUNT: 4.8 K/UL (ref 1.5–8.1)
SODIUM BLD-SCNC: 136 MMOL/L (ref 135–144)
TOTAL PROTEIN: 6.9 G/DL (ref 6.4–8.3)
WBC # BLD: 7.7 K/UL (ref 3.5–11.3)
WBC # BLD: ABNORMAL 10*3/UL

## 2019-05-23 PROCEDURE — 1036F TOBACCO NON-USER: CPT | Performed by: OBSTETRICS & GYNECOLOGY

## 2019-05-23 PROCEDURE — G8417 CALC BMI ABV UP PARAM F/U: HCPCS | Performed by: OBSTETRICS & GYNECOLOGY

## 2019-05-23 PROCEDURE — 99213 OFFICE O/P EST LOW 20 MIN: CPT | Performed by: OBSTETRICS & GYNECOLOGY

## 2019-05-23 PROCEDURE — G8427 DOCREV CUR MEDS BY ELIG CLIN: HCPCS | Performed by: OBSTETRICS & GYNECOLOGY

## 2019-05-24 ENCOUNTER — HOSPITAL ENCOUNTER (INPATIENT)
Age: 24
LOS: 3 days | Discharge: HOME | End: 2019-05-27
Attending: OBSTETRICS & GYNECOLOGY | Admitting: OBSTETRICS & GYNECOLOGY
Payer: COMMERCIAL

## 2019-05-24 ENCOUNTER — ANESTHESIA (OUTPATIENT)
Dept: LABOR AND DELIVERY | Age: 24
End: 2019-05-24
Payer: COMMERCIAL

## 2019-05-24 ENCOUNTER — ANESTHESIA EVENT (OUTPATIENT)
Dept: LABOR AND DELIVERY | Age: 24
End: 2019-05-24
Payer: COMMERCIAL

## 2019-05-24 ENCOUNTER — ROUTINE PRENATAL (OUTPATIENT)
Dept: PERINATAL CARE | Age: 24
End: 2019-05-24
Payer: COMMERCIAL

## 2019-05-24 VITALS
RESPIRATION RATE: 18 BRPM | HEART RATE: 82 BPM | DIASTOLIC BLOOD PRESSURE: 90 MMHG | BODY MASS INDEX: 48.82 KG/M2 | TEMPERATURE: 97.3 F | SYSTOLIC BLOOD PRESSURE: 151 MMHG | WEIGHT: 293 LBS | HEIGHT: 65 IN

## 2019-05-24 VITALS — SYSTOLIC BLOOD PRESSURE: 128 MMHG | OXYGEN SATURATION: 100 % | DIASTOLIC BLOOD PRESSURE: 62 MMHG

## 2019-05-24 DIAGNOSIS — O13.3 GESTATIONAL HYPERTENSION, THIRD TRIMESTER: ICD-10-CM

## 2019-05-24 DIAGNOSIS — O36.5931 POOR FETAL GROWTH AFFECTING MANAGEMENT OF MOTHER IN THIRD TRIMESTER, FETUS 1 OF MULTIPLE GESTATION: ICD-10-CM

## 2019-05-24 DIAGNOSIS — O30.043 DICHORIONIC DIAMNIOTIC TWIN PREGNANCY IN THIRD TRIMESTER: Primary | ICD-10-CM

## 2019-05-24 DIAGNOSIS — Z98.891 S/P REPEAT LOW TRANSVERSE C-SECTION: Primary | ICD-10-CM

## 2019-05-24 DIAGNOSIS — O99.213 OBESITY AFFECTING PREGNANCY IN THIRD TRIMESTER: ICD-10-CM

## 2019-05-24 DIAGNOSIS — Z36.4 ANTENATAL SCREENING FOR FETAL GROWTH RETARDATION USING ULTRASONICS: ICD-10-CM

## 2019-05-24 DIAGNOSIS — Z3A.36 36 WEEKS GESTATION OF PREGNANCY: ICD-10-CM

## 2019-05-24 PROBLEM — Z3A.35 35 WEEKS GESTATION OF PREGNANCY: Status: RESOLVED | Noted: 2019-05-17 | Resolved: 2019-05-24

## 2019-05-24 LAB
-: ABNORMAL
ABDOMINAL CIRCUMFERENCE: NORMAL CM
ABDOMINAL CIRCUMFERENCE: NORMAL CM
ABO/RH: NORMAL
ALBUMIN SERPL-MCNC: 3.5 G/DL (ref 3.5–5.2)
ALBUMIN/GLOBULIN RATIO: 1.1 (ref 1–2.5)
ALP BLD-CCNC: 129 U/L (ref 35–104)
ALT SERPL-CCNC: 8 U/L (ref 5–33)
AMORPHOUS: ABNORMAL
AMPHETAMINE SCREEN URINE: NEGATIVE
ANION GAP SERPL CALCULATED.3IONS-SCNC: 12 MMOL/L (ref 9–17)
ANION GAP SERPL CALCULATED.3IONS-SCNC: 14 MMOL/L (ref 9–17)
ANTIBODY SCREEN: NEGATIVE
ARM BAND NUMBER: NORMAL
AST SERPL-CCNC: 13 U/L
BACTERIA: ABNORMAL
BARBITURATE SCREEN URINE: NEGATIVE
BENZODIAZEPINE SCREEN, URINE: NEGATIVE
BILIRUB SERPL-MCNC: 0.45 MG/DL (ref 0.3–1.2)
BILIRUBIN URINE: ABNORMAL
BIPARIETAL DIAMETER: NORMAL CM
BIPARIETAL DIAMETER: NORMAL CM
BUN BLDV-MCNC: 7 MG/DL (ref 6–20)
BUN/CREAT BLD: ABNORMAL (ref 9–20)
BUPRENORPHINE URINE: NORMAL
CALCIUM SERPL-MCNC: 8.9 MG/DL (ref 8.6–10.4)
CANNABINOID SCREEN URINE: NEGATIVE
CASTS UA: ABNORMAL /LPF (ref 0–2)
CHLORIDE BLD-SCNC: 103 MMOL/L (ref 98–107)
CHLORIDE BLD-SCNC: 103 MMOL/L (ref 98–107)
CO2: 20 MMOL/L (ref 20–31)
CO2: 22 MMOL/L (ref 20–31)
COCAINE METABOLITE, URINE: NEGATIVE
COLOR: ABNORMAL
COMMENT UA: ABNORMAL
CREAT SERPL-MCNC: 0.7 MG/DL (ref 0.5–0.9)
CREATININE URINE: 280.3 MG/DL (ref 28–217)
CRYSTALS, UA: ABNORMAL /HPF
CULTURE: ABNORMAL
EPITHELIAL CELLS UA: ABNORMAL /HPF (ref 0–5)
ESTIMATED FETAL WEIGHT: NORMAL GRAMS
ESTIMATED FETAL WEIGHT: NORMAL GRAMS
EXPIRATION DATE: NORMAL
FEMORAL DIAMETER: NORMAL CM
FEMORAL DIAMETER: NORMAL CM
GFR AFRICAN AMERICAN: >60 ML/MIN
GFR NON-AFRICAN AMERICAN: >60 ML/MIN
GFR SERPL CREATININE-BSD FRML MDRD: ABNORMAL ML/MIN/{1.73_M2}
GFR SERPL CREATININE-BSD FRML MDRD: ABNORMAL ML/MIN/{1.73_M2}
GLUCOSE BLD-MCNC: 86 MG/DL (ref 70–99)
GLUCOSE URINE: NEGATIVE
HC/AC: NORMAL
HC/AC: NORMAL
HCT VFR BLD CALC: 29.3 % (ref 36.3–47.1)
HEAD CIRCUMFERENCE: NORMAL CM
HEAD CIRCUMFERENCE: NORMAL CM
HEMOGLOBIN: 9.1 G/DL (ref 11.9–15.1)
KETONES, URINE: ABNORMAL
LACTATE DEHYDROGENASE: 210 U/L (ref 135–214)
LEUKOCYTE ESTERASE, URINE: ABNORMAL
Lab: ABNORMAL
MCH RBC QN AUTO: 24.9 PG (ref 25.2–33.5)
MCHC RBC AUTO-ENTMCNC: 31.1 G/DL (ref 28.4–34.8)
MCV RBC AUTO: 80.1 FL (ref 82.6–102.9)
MDMA URINE: NORMAL
METHADONE SCREEN, URINE: NEGATIVE
METHAMPHETAMINE, URINE: NORMAL
MUCUS: ABNORMAL
NITRITE, URINE: NEGATIVE
NRBC AUTOMATED: 0 PER 100 WBC
OPIATES, URINE: NEGATIVE
OTHER OBSERVATIONS UA: ABNORMAL
OXYCODONE SCREEN URINE: NEGATIVE
PDW BLD-RTO: 14.2 % (ref 11.8–14.4)
PH UA: 6 (ref 5–8)
PHENCYCLIDINE, URINE: NEGATIVE
PLATELET # BLD: 194 K/UL (ref 138–453)
PMV BLD AUTO: 12.1 FL (ref 8.1–13.5)
POTASSIUM SERPL-SCNC: 3.9 MMOL/L (ref 3.7–5.3)
POTASSIUM SERPL-SCNC: 4.3 MMOL/L (ref 3.7–5.3)
PROPOXYPHENE, URINE: NORMAL
PROTEIN UA: ABNORMAL
RBC # BLD: 3.66 M/UL (ref 3.95–5.11)
RBC UA: ABNORMAL /HPF (ref 0–2)
RENAL EPITHELIAL, UA: ABNORMAL /HPF
SODIUM BLD-SCNC: 137 MMOL/L (ref 135–144)
SODIUM BLD-SCNC: 137 MMOL/L (ref 135–144)
SPECIFIC GRAVITY UA: 1.02 (ref 1–1.03)
SPECIMEN DESCRIPTION: ABNORMAL
T. PALLIDUM, IGG: NONREACTIVE
TEST INFORMATION: NORMAL
TOTAL PROTEIN, URINE: 110 MG/DL
TOTAL PROTEIN: 6.8 G/DL (ref 6.4–8.3)
TRICHOMONAS: ABNORMAL
TRICYCLIC ANTIDEPRESSANTS, UR: NORMAL
TURBIDITY: CLEAR
URIC ACID: 5.5 MG/DL (ref 2.4–5.7)
URINE HGB: NEGATIVE
URINE TOTAL PROTEIN CREATININE RATIO: 0.39 (ref 0–0.2)
UROBILINOGEN, URINE: NORMAL
WBC # BLD: 6.4 K/UL (ref 3.5–11.3)
WBC UA: ABNORMAL /HPF (ref 0–5)
YEAST: ABNORMAL

## 2019-05-24 PROCEDURE — 86900 BLOOD TYPING SEROLOGIC ABO: CPT

## 2019-05-24 PROCEDURE — 7100000000 HC PACU RECOVERY - FIRST 15 MIN: Performed by: OBSTETRICS & GYNECOLOGY

## 2019-05-24 PROCEDURE — 2580000003 HC RX 258: Performed by: STUDENT IN AN ORGANIZED HEALTH CARE EDUCATION/TRAINING PROGRAM

## 2019-05-24 PROCEDURE — G8417 CALC BMI ABV UP PARAM F/U: HCPCS | Performed by: OBSTETRICS & GYNECOLOGY

## 2019-05-24 PROCEDURE — 2580000003 HC RX 258: Performed by: NURSE ANESTHETIST, CERTIFIED REGISTERED

## 2019-05-24 PROCEDURE — 83036 HEMOGLOBIN GLYCOSYLATED A1C: CPT

## 2019-05-24 PROCEDURE — 6370000000 HC RX 637 (ALT 250 FOR IP): Performed by: STUDENT IN AN ORGANIZED HEALTH CARE EDUCATION/TRAINING PROGRAM

## 2019-05-24 PROCEDURE — 6360000002 HC RX W HCPCS

## 2019-05-24 PROCEDURE — 84550 ASSAY OF BLOOD/URIC ACID: CPT

## 2019-05-24 PROCEDURE — 83615 LACTATE (LD) (LDH) ENZYME: CPT

## 2019-05-24 PROCEDURE — 6360000002 HC RX W HCPCS: Performed by: STUDENT IN AN ORGANIZED HEALTH CARE EDUCATION/TRAINING PROGRAM

## 2019-05-24 PROCEDURE — 3609079900 HC CESAREAN SECTION: Performed by: OBSTETRICS & GYNECOLOGY

## 2019-05-24 PROCEDURE — 81001 URINALYSIS AUTO W/SCOPE: CPT

## 2019-05-24 PROCEDURE — 36415 COLL VENOUS BLD VENIPUNCTURE: CPT

## 2019-05-24 PROCEDURE — 80307 DRUG TEST PRSMV CHEM ANLYZR: CPT

## 2019-05-24 PROCEDURE — 87086 URINE CULTURE/COLONY COUNT: CPT

## 2019-05-24 PROCEDURE — 86850 RBC ANTIBODY SCREEN: CPT

## 2019-05-24 PROCEDURE — 84156 ASSAY OF PROTEIN URINE: CPT

## 2019-05-24 PROCEDURE — 3700000001 HC ADD 15 MINUTES (ANESTHESIA): Performed by: OBSTETRICS & GYNECOLOGY

## 2019-05-24 PROCEDURE — 59514 CESAREAN DELIVERY ONLY: CPT | Performed by: OBSTETRICS & GYNECOLOGY

## 2019-05-24 PROCEDURE — 1220000000 HC SEMI PRIVATE OB R&B

## 2019-05-24 PROCEDURE — 86901 BLOOD TYPING SEROLOGIC RH(D): CPT

## 2019-05-24 PROCEDURE — 76819 FETAL BIOPHYS PROFIL W/O NST: CPT | Performed by: OBSTETRICS & GYNECOLOGY

## 2019-05-24 PROCEDURE — 6360000002 HC RX W HCPCS: Performed by: NURSE ANESTHETIST, CERTIFIED REGISTERED

## 2019-05-24 PROCEDURE — 7100000001 HC PACU RECOVERY - ADDTL 15 MIN: Performed by: OBSTETRICS & GYNECOLOGY

## 2019-05-24 PROCEDURE — 76816 OB US FOLLOW-UP PER FETUS: CPT | Performed by: OBSTETRICS & GYNECOLOGY

## 2019-05-24 PROCEDURE — 85027 COMPLETE CBC AUTOMATED: CPT

## 2019-05-24 PROCEDURE — 99211 OFF/OP EST MAY X REQ PHY/QHP: CPT | Performed by: OBSTETRICS & GYNECOLOGY

## 2019-05-24 PROCEDURE — 2709999900 HC NON-CHARGEABLE SUPPLY: Performed by: OBSTETRICS & GYNECOLOGY

## 2019-05-24 PROCEDURE — 86780 TREPONEMA PALLIDUM: CPT

## 2019-05-24 PROCEDURE — 80051 ELECTROLYTE PANEL: CPT

## 2019-05-24 PROCEDURE — 76820 UMBILICAL ARTERY ECHO: CPT | Performed by: OBSTETRICS & GYNECOLOGY

## 2019-05-24 PROCEDURE — 2500000003 HC RX 250 WO HCPCS: Performed by: NURSE ANESTHETIST, CERTIFIED REGISTERED

## 2019-05-24 PROCEDURE — G8427 DOCREV CUR MEDS BY ELIG CLIN: HCPCS | Performed by: OBSTETRICS & GYNECOLOGY

## 2019-05-24 PROCEDURE — 82570 ASSAY OF URINE CREATININE: CPT

## 2019-05-24 PROCEDURE — 88307 TISSUE EXAM BY PATHOLOGIST: CPT

## 2019-05-24 PROCEDURE — 80053 COMPREHEN METABOLIC PANEL: CPT

## 2019-05-24 PROCEDURE — 3700000000 HC ANESTHESIA ATTENDED CARE: Performed by: OBSTETRICS & GYNECOLOGY

## 2019-05-24 RX ORDER — ACETAMINOPHEN 500 MG
1000 TABLET ORAL EVERY 6 HOURS PRN
Status: DISCONTINUED | OUTPATIENT
Start: 2019-05-25 | End: 2019-05-28 | Stop reason: HOSPADM

## 2019-05-24 RX ORDER — IBUPROFEN 800 MG/1
800 TABLET ORAL EVERY 8 HOURS PRN
Status: DISCONTINUED | OUTPATIENT
Start: 2019-05-25 | End: 2019-05-28 | Stop reason: HOSPADM

## 2019-05-24 RX ORDER — SODIUM CHLORIDE, SODIUM LACTATE, POTASSIUM CHLORIDE, CALCIUM CHLORIDE 600; 310; 30; 20 MG/100ML; MG/100ML; MG/100ML; MG/100ML
INJECTION, SOLUTION INTRAVENOUS CONTINUOUS
Status: DISCONTINUED | OUTPATIENT
Start: 2019-05-25 | End: 2019-05-25

## 2019-05-24 RX ORDER — FENTANYL CITRATE 50 UG/ML
INJECTION, SOLUTION INTRAMUSCULAR; INTRAVENOUS PRN
Status: DISCONTINUED | OUTPATIENT
Start: 2019-05-24 | End: 2019-05-24 | Stop reason: SDUPTHER

## 2019-05-24 RX ORDER — DIPHENHYDRAMINE HYDROCHLORIDE 50 MG/ML
25 INJECTION INTRAMUSCULAR; INTRAVENOUS EVERY 6 HOURS PRN
Status: DISCONTINUED | OUTPATIENT
Start: 2019-05-24 | End: 2019-05-24

## 2019-05-24 RX ORDER — SODIUM CHLORIDE, SODIUM LACTATE, POTASSIUM CHLORIDE, CALCIUM CHLORIDE 600; 310; 30; 20 MG/100ML; MG/100ML; MG/100ML; MG/100ML
INJECTION, SOLUTION INTRAVENOUS CONTINUOUS
Status: DISCONTINUED | OUTPATIENT
Start: 2019-05-24 | End: 2019-05-24

## 2019-05-24 RX ORDER — DOCUSATE SODIUM 100 MG/1
100 CAPSULE, LIQUID FILLED ORAL 2 TIMES DAILY
Status: DISCONTINUED | OUTPATIENT
Start: 2019-05-25 | End: 2019-05-28 | Stop reason: HOSPADM

## 2019-05-24 RX ORDER — DIPHENHYDRAMINE HYDROCHLORIDE 50 MG/ML
25 INJECTION INTRAMUSCULAR; INTRAVENOUS EVERY 6 HOURS PRN
Status: DISCONTINUED | OUTPATIENT
Start: 2019-05-24 | End: 2019-05-28 | Stop reason: HOSPADM

## 2019-05-24 RX ORDER — SIMETHICONE 80 MG
80 TABLET,CHEWABLE ORAL EVERY 6 HOURS PRN
Status: DISCONTINUED | OUTPATIENT
Start: 2019-05-24 | End: 2019-05-28 | Stop reason: HOSPADM

## 2019-05-24 RX ORDER — ZOLPIDEM TARTRATE 5 MG/1
5 TABLET ORAL ONCE
Status: DISCONTINUED | OUTPATIENT
Start: 2019-05-25 | End: 2019-05-24

## 2019-05-24 RX ORDER — LANOLIN 100 %
OINTMENT (GRAM) TOPICAL
Status: DISCONTINUED | OUTPATIENT
Start: 2019-05-24 | End: 2019-05-28 | Stop reason: HOSPADM

## 2019-05-24 RX ORDER — ONDANSETRON 2 MG/ML
INJECTION INTRAMUSCULAR; INTRAVENOUS PRN
Status: DISCONTINUED | OUTPATIENT
Start: 2019-05-24 | End: 2019-05-24 | Stop reason: SDUPTHER

## 2019-05-24 RX ORDER — PRENATAL WITH FERROUS FUM AND FOLIC ACID 3080; 920; 120; 400; 22; 1.84; 3; 20; 10; 1; 12; 200; 27; 25; 2 [IU]/1; [IU]/1; MG/1; [IU]/1; MG/1; MG/1; MG/1; MG/1; MG/1; MG/1; UG/1; MG/1; MG/1; MG/1; MG/1
1 TABLET ORAL DAILY
Status: DISCONTINUED | OUTPATIENT
Start: 2019-05-25 | End: 2019-05-28 | Stop reason: HOSPADM

## 2019-05-24 RX ORDER — SODIUM CHLORIDE 0.9 % (FLUSH) 0.9 %
10 SYRINGE (ML) INJECTION PRN
Status: DISCONTINUED | OUTPATIENT
Start: 2019-05-24 | End: 2019-05-24

## 2019-05-24 RX ORDER — OXYCODONE HYDROCHLORIDE AND ACETAMINOPHEN 5; 325 MG/1; MG/1
1 TABLET ORAL EVERY 4 HOURS PRN
Status: DISCONTINUED | OUTPATIENT
Start: 2019-05-25 | End: 2019-05-28 | Stop reason: HOSPADM

## 2019-05-24 RX ORDER — SODIUM CHLORIDE 0.9 % (FLUSH) 0.9 %
10 SYRINGE (ML) INJECTION EVERY 12 HOURS SCHEDULED
Status: DISCONTINUED | OUTPATIENT
Start: 2019-05-24 | End: 2019-05-24

## 2019-05-24 RX ORDER — BISACODYL 10 MG
10 SUPPOSITORY, RECTAL RECTAL DAILY PRN
Status: DISCONTINUED | OUTPATIENT
Start: 2019-05-24 | End: 2019-05-28 | Stop reason: HOSPADM

## 2019-05-24 RX ORDER — SODIUM CHLORIDE, SODIUM LACTATE, POTASSIUM CHLORIDE, CALCIUM CHLORIDE 600; 310; 30; 20 MG/100ML; MG/100ML; MG/100ML; MG/100ML
INJECTION, SOLUTION INTRAVENOUS CONTINUOUS PRN
Status: DISCONTINUED | OUTPATIENT
Start: 2019-05-24 | End: 2019-05-24 | Stop reason: SDUPTHER

## 2019-05-24 RX ORDER — SODIUM CHLORIDE 0.9 % (FLUSH) 0.9 %
10 SYRINGE (ML) INJECTION PRN
Status: DISCONTINUED | OUTPATIENT
Start: 2019-05-24 | End: 2019-05-28 | Stop reason: HOSPADM

## 2019-05-24 RX ORDER — DIPHENHYDRAMINE HYDROCHLORIDE 50 MG/ML
50 INJECTION INTRAMUSCULAR; INTRAVENOUS EVERY 6 HOURS PRN
Status: DISCONTINUED | OUTPATIENT
Start: 2019-05-24 | End: 2019-05-24

## 2019-05-24 RX ORDER — POLYETHYLENE GLYCOL 3350 17 G/17G
17 POWDER, FOR SOLUTION ORAL DAILY PRN
Status: DISCONTINUED | OUTPATIENT
Start: 2019-05-24 | End: 2019-05-28 | Stop reason: HOSPADM

## 2019-05-24 RX ORDER — ONDANSETRON 2 MG/ML
4 INJECTION INTRAMUSCULAR; INTRAVENOUS EVERY 6 HOURS PRN
Status: DISCONTINUED | OUTPATIENT
Start: 2019-05-24 | End: 2019-05-28 | Stop reason: HOSPADM

## 2019-05-24 RX ORDER — TRISODIUM CITRATE DIHYDRATE AND CITRIC ACID MONOHYDRATE 500; 334 MG/5ML; MG/5ML
30 SOLUTION ORAL ONCE
Status: COMPLETED | OUTPATIENT
Start: 2019-05-24 | End: 2019-05-24

## 2019-05-24 RX ORDER — BUPIVACAINE HYDROCHLORIDE 7.5 MG/ML
INJECTION, SOLUTION INTRASPINAL PRN
Status: DISCONTINUED | OUTPATIENT
Start: 2019-05-24 | End: 2019-05-24 | Stop reason: SDUPTHER

## 2019-05-24 RX ORDER — KETOROLAC TROMETHAMINE 30 MG/ML
30 INJECTION, SOLUTION INTRAMUSCULAR; INTRAVENOUS EVERY 6 HOURS
Status: COMPLETED | OUTPATIENT
Start: 2019-05-25 | End: 2019-05-25

## 2019-05-24 RX ORDER — OXYCODONE HYDROCHLORIDE AND ACETAMINOPHEN 5; 325 MG/1; MG/1
2 TABLET ORAL EVERY 4 HOURS PRN
Status: DISCONTINUED | OUTPATIENT
Start: 2019-05-25 | End: 2019-05-28 | Stop reason: HOSPADM

## 2019-05-24 RX ORDER — DEXAMETHASONE SODIUM PHOSPHATE 10 MG/ML
INJECTION INTRAMUSCULAR; INTRAVENOUS PRN
Status: DISCONTINUED | OUTPATIENT
Start: 2019-05-24 | End: 2019-05-24 | Stop reason: SDUPTHER

## 2019-05-24 RX ORDER — DIPHENHYDRAMINE HYDROCHLORIDE 50 MG/ML
INJECTION INTRAMUSCULAR; INTRAVENOUS
Status: COMPLETED
Start: 2019-05-24 | End: 2019-05-24

## 2019-05-24 RX ORDER — NALOXONE HYDROCHLORIDE 0.4 MG/ML
0.4 INJECTION, SOLUTION INTRAMUSCULAR; INTRAVENOUS; SUBCUTANEOUS PRN
Status: DISCONTINUED | OUTPATIENT
Start: 2019-05-24 | End: 2019-05-28 | Stop reason: HOSPADM

## 2019-05-24 RX ADMIN — SODIUM CHLORIDE, POTASSIUM CHLORIDE, SODIUM LACTATE AND CALCIUM CHLORIDE: 600; 310; 30; 20 INJECTION, SOLUTION INTRAVENOUS at 17:11

## 2019-05-24 RX ADMIN — PHENYLEPHRINE HYDROCHLORIDE 200 MCG: 10 INJECTION INTRAVENOUS at 18:55

## 2019-05-24 RX ADMIN — SODIUM CHLORIDE, POTASSIUM CHLORIDE, SODIUM LACTATE AND CALCIUM CHLORIDE: 600; 310; 30; 20 INJECTION, SOLUTION INTRAVENOUS at 19:50

## 2019-05-24 RX ADMIN — Medication 1 MILLI-UNITS/MIN: at 19:21

## 2019-05-24 RX ADMIN — Medication 199 ML: at 20:04

## 2019-05-24 RX ADMIN — Medication 1 ML: at 19:21

## 2019-05-24 RX ADMIN — FENTANYL CITRATE 25 MCG: 50 INJECTION INTRAMUSCULAR; INTRAVENOUS at 19:32

## 2019-05-24 RX ADMIN — HYDROMORPHONE HYDROCHLORIDE 0.5 MG: 1 INJECTION, SOLUTION INTRAMUSCULAR; INTRAVENOUS; SUBCUTANEOUS at 22:12

## 2019-05-24 RX ADMIN — DEXTROSE MONOHYDRATE 3 G: 50 INJECTION, SOLUTION INTRAVENOUS at 17:11

## 2019-05-24 RX ADMIN — SODIUM CHLORIDE, POTASSIUM CHLORIDE, SODIUM LACTATE AND CALCIUM CHLORIDE: 600; 310; 30; 20 INJECTION, SOLUTION INTRAVENOUS at 18:57

## 2019-05-24 RX ADMIN — ONDANSETRON 4 MG: 2 INJECTION, SOLUTION INTRAMUSCULAR; INTRAVENOUS at 19:48

## 2019-05-24 RX ADMIN — DEXAMETHASONE SODIUM PHOSPHATE 10 MG: 10 INJECTION INTRAMUSCULAR; INTRAVENOUS at 19:48

## 2019-05-24 RX ADMIN — FENTANYL CITRATE 65 MCG: 50 INJECTION INTRAMUSCULAR; INTRAVENOUS at 19:55

## 2019-05-24 RX ADMIN — FENTANYL CITRATE 10 MCG: 50 INJECTION INTRAMUSCULAR; INTRAVENOUS at 18:46

## 2019-05-24 RX ADMIN — SODIUM CHLORIDE, POTASSIUM CHLORIDE, SODIUM LACTATE AND CALCIUM CHLORIDE: 600; 310; 30; 20 INJECTION, SOLUTION INTRAVENOUS at 19:54

## 2019-05-24 RX ADMIN — SODIUM CITRATE AND CITRIC ACID MONOHYDRATE 30 ML: 500; 334 SOLUTION ORAL at 18:32

## 2019-05-24 RX ADMIN — PHENYLEPHRINE HYDROCHLORIDE 200 MCG: 10 INJECTION INTRAVENOUS at 18:59

## 2019-05-24 RX ADMIN — PHENYLEPHRINE HYDROCHLORIDE 200 MCG: 10 INJECTION INTRAVENOUS at 19:04

## 2019-05-24 RX ADMIN — BUPIVACAINE HYDROCHLORIDE IN DEXTROSE 1.8 ML: 7.5 INJECTION, SOLUTION SUBARACHNOID at 18:46

## 2019-05-24 RX ADMIN — PHENYLEPHRINE HYDROCHLORIDE 200 MCG: 10 INJECTION INTRAVENOUS at 18:50

## 2019-05-24 RX ADMIN — DIPHENHYDRAMINE HYDROCHLORIDE 50 MG: 50 INJECTION INTRAMUSCULAR; INTRAVENOUS at 17:28

## 2019-05-24 ASSESSMENT — PULMONARY FUNCTION TESTS
PIF_VALUE: 1
PIF_VALUE: 0
PIF_VALUE: 1
PIF_VALUE: 0
PIF_VALUE: 1
PIF_VALUE: 0
PIF_VALUE: 1
PIF_VALUE: 0
PIF_VALUE: 0
PIF_VALUE: 1
PIF_VALUE: 0
PIF_VALUE: 1
PIF_VALUE: 0
PIF_VALUE: 1
PIF_VALUE: 0
PIF_VALUE: 1
PIF_VALUE: 0
PIF_VALUE: 1
PIF_VALUE: 0
PIF_VALUE: 1
PIF_VALUE: 0
PIF_VALUE: 1

## 2019-05-24 ASSESSMENT — PAIN SCALES - GENERAL: PAINLEVEL_OUTOF10: 4

## 2019-05-24 NOTE — PROGRESS NOTES
Pt requested to take the FHR monitor and TOCO so that she can lay on her side and take a nap. Risks of stopping continuous fetal monitoring explained to the patient. Pt verbalized understanding of risks. Pt states \"Take the monitor parts off please. \"

## 2019-05-24 NOTE — PROGRESS NOTES
OBGYN Resident Interval Note    RN called out that patient was having an allergic reaction to the Ancef that was administered for C/S prophylaxis. Patient is having troubling speaking. Admits to having shortness of breath and chest pain mostly with inspiration. Pulse ox 100%, patient given oxygen afterward   Vitals:    19 1216 19 1304 19 1334   BP:   134/85   Pulse:  90 82   Resp: 18 18    Temp:  98.4 °F (36.9 °C)    TempSrc:  Oral    SpO2: 99%       Lungs with expiratory wheezes bilaterally >lung bases, no crackles or edema appreciated. Oropharynx visualized and no visible edema seen. With recent diagnosis of PreE w/o SFs (based off BPs and P/C ratio), low threshold to do imaging if wheezing and dyspnea continues after  section. Benadryl 50 IV administered to patient with improvement in dyspnea.      Dr. Martha Tello updated an en route to hospital    Patt Ruiz DO  OB/GYN Resident, PGY3  [facility]  2019 5:47 PM

## 2019-05-24 NOTE — PROGRESS NOTES
[facility] Office Visit:  I would advise delivery today at 39 0/7 weeks' gestation in this Dichorionic-Diamniotic twin gestation with fetal growth restriction for fetus A and given the maternal/fetal medical/obstetrical complications of pregnancy,  as per [employer] of Obstetricians and Gynecologists and the Society for Maternal-Fetal Medicine guidelines in the ACOG Committee Opinion Number [id number] (\"Medically Indicated Late- and Early-Term Deliveries\", Obstetrics & Gynecology, Volume 133, NO.2, 2019). Above findings reviewed with the patient today. She is amenable to the above plan of care for delivery as well and all questions have been answered. Labor and delivery and covering obstetrics attending provider (Dr. Shell Lugo) notified of the above findings and plan of care appropriately coordinated for delivery.

## 2019-05-24 NOTE — DISCHARGE SUMMARY
Obstetric Discharge Summary  [address] [facility]    Patient Name: Destiny Soares  Patient : 1995  Primary Care Physician: Winnie Lopes Host  Admit Date: 2019    Principal Diagnosis: IUP at 36w0d, admitted for MF recommended delivery 2/2 IUGR (Baby A) and newly diagnosed gestational hypertension     Her pregnancy has been complicated by:   Patient Active Problem List   Diagnosis    Anxiety    Depression    Former tobacco use    Hx C/S x1 (G1)    HSV-2 (herpes simplex virus 2) infection    Urinary tract infection in mother during first trimester of pregnancy    Obesity affecting pregnancy in third trimester    Asthma affecting pregnancy in third trimester    Pregnancy affected by fetal growth restriction    Dichorionic diamniotic twin pregnancy in third trimester    Rh+/RI/GBS pend    RLTCS 19 F APG 8/ Wt 4#9, F 8/9 Wt 5#15       Infection Present?: No  Hospital Acquired: No    Surgical Operations & Procedures:  [] Pitocin Induction of Labor  [] Pitocin Augmentation of Labor  [] Prostaglandin Induction of Labor  [] Mechanical Induction of Labor  [] Artificial Rupture of Membranes  [] Intrauterine Pressure Catheter  [] Fetal Scalp Electrode  [] Amnioinfusion  Analgesia: spinal  Delivery Type:  Delivery: See Labor and Delivery Summary   Laceration(s): Absent    Consultations: NICU and Anesthesia    Pertinent Findings & Procedures:   Destiny Soares is a 24 y.o. female  with Di/Di twin gestation at 36w0d admitted on 19 for MF recommended delivery 2/2 IUGR (baby A) and newly diagnosed gestational hypertension. PreE labs were drawn on admission and were found to be wnl, however, P/C ratio 0.39 (19). She received 3g Ancef preoperatively and had an allergic reaction to with expiratory wheezing and dyspnea. She received most of the 3g dose besides 25mL of the 200mL bag and no other antibiotics were deemed necessary preoperatively.  She delivered by  800 MG tablet  · oxyCODONE-acetaminophen 5-325 MG per tablet         Activity: pelvic rest x 6 weeks, no driving while taking narcotics, no lifting greater than 15 lbs  Diet: regular diet  Follow up: 4 days    Condition on discharge: stable    Discharge date: 5/27/19    Bari Lobo DO  Ob/Gyn Resident    Comments:  Home care and follow-up care were reviewed. Pelvic rest, and birth control were reviewed. Signs and symptoms of mastitis and post partum depression were reviewed. The patient is to notify her physician if any of these occur. The patient was counseled on secondary smoke risks and the increased risk of sudden infant death syndrome and respiratory problems to her baby with exposure. She was counseled on various alternate recommendations to decrease the exposure to secondary smoke to her children.

## 2019-05-24 NOTE — ANESTHESIA PRE PROCEDURE
Department of Anesthesiology  Preprocedure Note       Name:  Ting Laura   Age:  24 y. o.  :  1995                                          MRN:  [id number]         Date:  2019      Surgeon: Mady Cardona):  Lisa Newman MD    Procedure:  SECTION (N/A Abdomen)    Medications prior to admission:   Prior to Admission medications    Medication Sig Start Date End Date Taking? Authorizing Provider   valACYclovir (VALTREX) 500 MG tablet  19  Yes Historical Provider, MD   Prenatal Vit-Fe Fumarate-FA (PREPLUS) 27-1 MG TABS  19   Historical Provider, MD   EPINEPHrine (EPIPEN) 0.3 MG/0.3ML SOAJ injection Inject 0.3 mg into the muscle 18   Historical Provider, MD   albuterol sulfate HFA (PROVENTIL HFA) 108 (90 Base) MCG/ACT inhaler Inhale 2 puffs into the lungs every 4 hours as needed for Wheezing or Shortness of Breath (Space out to every 6 hours as symptoms improve) Space out to every 6 hours as symptoms improve. 18   Sara Vann MD       Current medications:    Current Facility-Administered Medications   Medication Dose Route Frequency Provider Last Rate Last Dose    lactated ringers infusion   Intravenous Continuous Progga Samano,  mL/hr at 19 1711      sodium chloride flush 0.9 % injection 10 mL  10 mL Intravenous 2 times per day Mercedes Mosquera DO        sodium chloride flush 0.9 % injection 10 mL  10 mL Intravenous PRN Mercedes Mosquera DO        oxytocin (PITOCIN) 30 units in 500 mL infusion  1 karen-units/min Intravenous Continuous Mercedes Mosquera DO           Allergies:     Allergies   Allergen Reactions    Ancef [Cefazolin] Shortness Of Breath       Problem List:    Patient Active Problem List   Diagnosis Code    Anxiety F41.9    Depression F32.9    Former tobacco use Z87.891    Hx C/S x1 (G1) O31.200    HSV-2 (herpes simplex virus 2) infection B00.9    Urinary tract infection in mother during first trimester of pregnancy O23.41    Obesity affecting pregnancy in third trimester O99.213    Asthma affecting pregnancy in third trimester O99.513, J45.909    Pregnancy affected by fetal growth restriction O36.5990    Dichorionic diamniotic twin pregnancy in third trimester O30.043    Rh+/RI/GBS pend O09.90       Past Medical History:        Diagnosis Date    Anemia     Anxiety 2015    Asthma     inhaler prn    Asthma affecting pregnancy in second trimester 2019    Back pain 2011    Depression 2015    HSV infection 2018    Neck pain 2011       Past Surgical History:        Procedure Laterality Date     SECTION, LOW TRANSVERSE  2015       Social History:    Social History     Tobacco Use    Smoking status: Former Smoker     Types: Cigars    Smokeless tobacco: Never Used   Substance Use Topics    Alcohol use:  No                                Counseling given: Not Answered      Vital Signs (Current):   Vitals:    19 1810 19 1815 19 1820 19 182   BP:       Pulse:       Resp:       Temp:       TempSrc:       SpO2: 100% 100% 100% 100%                                              BP Readings from Last 3 Encounters:   19 (!) 148/79   19 (!) 151/90   19 (!) 154/90       NPO Status:                                                                                 BMI:   Wt Readings from Last 3 Encounters:   19 (!) 304 lb (137.9 kg)   19 (!) 304 lb (137.9 kg)   19 300 lb (136.1 kg)     There is no height or weight on file to calculate BMI.    CBC:   Lab Results   Component Value Date    WBC 6.4 2019    RBC 3.66 2019    HGB 9.1 2019    HCT 29.3 2019    MCV 80.1 2019    RDW 14.2 2019     2019       CMP:   Lab Results   Component Value Date     2019    K 3.9 2019     2019    CO2 22 2019    BUN 7 2019    CREATININE 0.70 2019    GFRAA >60 2019    LABGLOM >60 2019    GLUCOSE 86 05/24/2019    PROT 6.8 05/24/2019    CALCIUM 8.9 05/24/2019    BILITOT 0.45 05/24/2019    ALKPHOS 129 05/24/2019    AST 13 05/24/2019    ALT 8 05/24/2019       POC Tests: No results for input(s): POCGLU, POCNA, POCK, POCCL, POCBUN, POCHEMO, POCHCT in the last 72 hours. Coags: No results found for: PROTIME, INR, APTT    HCG (If Applicable):   Lab Results   Component Value Date    PREGTESTUR NEGATIVE 04/10/2013    HCG NEGATIVE 10/24/2012    HCGQUANT 127,555 (H) 09/27/2014        ABGs: No results found for: PHART, PO2ART, ZEF9ZDP, AYQ7JXR, BEART, R5XFEOEY     Type & Screen (If Applicable):  No results found for: [facility]    Anesthesia Evaluation  Patient summary reviewed and Nursing notes reviewed  Airway: Mallampati: III        Dental: normal exam         Pulmonary: breath sounds clear to auscultation  (+) asthma:                           ROS comment: Reportedly has had brief SOB, chest pain and expiratory wheezing earlier : RN thught it was anaphylaxis reaction to Ancef and was d/rosy and given IV Benadryl. Presently patient has no wheezing and appears to be stable. BP was stable and no tachyarryhthmias during the episode earlier. Cardiovascular:            Rhythm: regular                   ROS comment: Hypertension, pre eclampsia,      Neuro/Psych:   (+) headaches (fmild pre eclampsia):, psychiatric history:            GI/Hepatic/Renal:            ROS comment: Elevated LFT. .   Endo/Other:                     Abdominal:   (+) obese,         Vascular:                                        Anesthesia Plan      general and spinal     ASA 3 - emergent     (Strong possibility of GA was discussed including possibly post op vent if needed  36 wk twin gestation with IUGR of baby A)        Anesthetic plan and risks discussed with patient (several family members. ).                     Indiana Greene MD   5/24/2019

## 2019-05-24 NOTE — ANESTHESIA PROCEDURE NOTES
Spinal Block    Patient location during procedure: OR  Start time: 5/24/2019 6:41 PM  End time: 5/24/2019 6:46 PM  Reason for block: primary anesthetic and at surgeon's request  Staffing  Resident/CRNA: NITHIN Blum CRNA  Performed: resident/CRNA   Preanesthetic Checklist  Completed: patient identified, site marked, surgical consent, pre-op evaluation, timeout performed, IV checked, risks and benefits discussed, monitors and equipment checked, anesthesia consent given, oxygen available and patient being monitored  Spinal Block  Patient position: sitting  Prep: Betadine  Patient monitoring: frequent blood pressure checks and continuous pulse ox  Approach: midline  Location: L3/L4  Provider prep: sterile gloves and mask  Local infiltration: lidocaine  Dose: 0.1  Agent: bupivacaine  Adjuvant: fentanyl  Dose: 1.8  Dose: 1.8  Needle  Needle type: Pencan   Needle gauge: 24 G  Needle length: 3.5 in  Assessment  Sensory level: T4  Swirl obtained: Yes  CSF: clear  Attempts: 1  Hemodynamics: stable

## 2019-05-24 NOTE — H&P
OBSTETRICAL HISTORY Formerly [facility]    Date: 2019       Time: 12:43 PM   Patient Name: Mathew Avila     Patient : 1995  Room/Bed: [address] [place], [place] [id number]-    Admission Date/Time: 2019 11:27 AM      CC: [facility] recommended delivery 2/2 IUGR (Baby A) and newly diagnosed Gestational Hypertension     HPI: Mathew Avila is a 24 y.o.  Di/Di twin gestation at 44w0d who presents for [facility] recommended delivery secondary to IUGR (Baby A) and newly diagnosed gestational hypertension. Patient had several elevated blood pressures in the [facility] office earlier today (BPs 150/90s) and had an elevated blood pressure >140/90 yesterday thus meeting criteria for gestational hypertension. She reports she has been having increased headaches on and off for the past week, but denies any s/s of preE today. Denies HA, VC, CP, SOB, RUQ pain, N/V or increased pain/swelling in extremities. The patient reports fetal movement is present, denies contractions, denies loss of fluid, denies vaginal bleeding. Pregnancy is complicated by Spontaneously conceived Dichorionic/Diamniotic twin gestation, Hx C/S x1 (G1), Abnormal 1h GTT (133; no 3h GTT), Asthma, HSV-2, Anxiety/Depression, and Morbid Obesity (BMI > 45). DATING:  LMP: Patient's last menstrual period was 2018.   Estimated Date of Delivery: 19   Based on: LMP    PREGNANCY RISK FACTORS:  Patient Active Problem List   Diagnosis    Anxiety    Depression    Former tobacco use    Hx C/S x1 (G1)    HSV-2 (herpes simplex virus 2) infection    Urinary tract infection in mother during first trimester of pregnancy    Obesity affecting pregnancy in third trimester    Asthma affecting pregnancy in third trimester    Pregnancy affected by fetal growth restriction    Dichorionic diamniotic twin pregnancy in third trimester    Rh+/RI/GBS pend        Steroids Given In This Pregnancy:  no     REVIEW OF SYSTEMS:   Constitutional: negative fever, negative chills  HEENT: negative visual disturbances, negative headaches  Respiratory: negative dyspnea, negative cough  Cardiovascular: negative chest pain,  negative palpitations  Gastrointestinal: negative abdominal pain, negative RUQ pain, negative N/V, negative diarrhea, negative constipation  Genitourinary: negative dysuria, negative vaginal discharge, negative vaginal bleeding  Dermatological: negative rash, negative lesions, negative pruritus  Hematologic: negative bruising  Immunologic/Lymphatic: negative recent illness, negative recent sick contact  Musculoskeletal: negative back pain, negative myalgias, negative arthralgias  Neurological:  negative dizziness, negative weakness  Behavior/Psych: negative depression, negative anxiety      OBSTETRICAL HISTORY:   OB History    Para Term  AB Living   2 1 1 0 0 1   SAB TAB Ectopic Molar Multiple Live Births   0 0 0 0 0 1      # Outcome Date GA Lbr Luis Felipe/2nd Weight Sex Delivery Anes PTL Lv   2 Current            1 Term 04/09/15 38w0d  6 lb 15 oz (3.147 kg) F CS-LTranv   HALINA      Complications: Fetal Intolerance, Oligohydramnios       PAST MEDICAL HISTORY:   has a past medical history of Anemia, Anxiety, Asthma, Asthma affecting pregnancy in second trimester, Back pain, Depression, HSV infection, and Neck pain. PAST SURGICAL HISTORY:   has a past surgical history that includes  section, low transverse (2015). ALLERGIES:  has No Known Allergies. MEDICATIONS:  Prior to Admission medications    Medication Sig Start Date End Date Taking?  Authorizing Provider   Prenatal Vit-Fe Fumarate-FA (PREPLUS) 27-1 MG TABS  19   Historical Provider, MD   EPINEPHrine (EPIPEN) 0.3 MG/0.3ML SOAJ injection Inject 0.3 mg into the muscle 18   Historical Provider, MD   valACYclovir (VALTREX) 500 MG tablet  19   Historical Provider, MD   albuterol sulfate HFA (PROVENTIL HFA) 108 (90 Base) MCG/ACT inhaler Inhale 2 puffs into the lungs every 4 hours as needed for Wheezing or Shortness of Breath (Space out to every 6 hours as symptoms improve) Space out to every 6 hours as symptoms improve. 4/23/18   Danish Bledsoe MD       FAMILY HISTORY:  family history includes Cancer in her maternal grandfather; Diabetes in her mother; Other in her mother. SOCIAL HISTORY:   reports that she has quit smoking. Her smoking use included cigars. She has never used smokeless tobacco. She reports that she does not drink alcohol or use drugs.     VITALS:  Vitals:    05/24/19 1216 05/24/19 1304   Pulse:  90   Resp: 18 18   Temp:  98.4 °F (36.9 °C)   TempSrc:  Oral   SpO2: 99%        PHYSICAL EXAM:  Fetal Heart Monitor:    Baby A: Baseline Heart Rate 120, moderate variability, present accelerations, absent decelerations  Baby B: Baseline Heart Rate 130, moderate variability, present accelerations, absent decelerations  [place]: contractions, none    General appearance:  no apparent distress, alert and cooperative  Neurologic:  alert, oriented, normal speech, no focal findings or movement disorder noted  Lungs:  No increased work of breathing, good air exchange, clear to auscultation bilaterally, no crackles or wheezing  Heart:  regular rate and rhythm and no murmur    Abdomen:  soft, gravid, non-tender, no right upper quadrant tenderness, no CVA tenderness, uterus non-tender, no signs of abruption and no signs of chorioamnionitis  Extremities:  no calf tenderness, non edematous, DTRs: normal    Pelvic Exam: not indicated        OMM Structural Exam:  Chief Complaint:  Pregnancy    Anterior/ Posterior Spinal Curves: Lumbar Lordosis -  Increased  Scoliosis (Lateral Spinal Curves): None  Assessment Tool:  T= Tenderness, A= Asymmetry, R= Restricted Motion (A=Active, P=Passive), T=Tissue Texture Changes  Region Evaluated : Severity / Specific of Major Somatic Dysfunction  M99.03 Lumbar -  Minor TART - more than BG levels -   Major Correlations with:  Genitourinary  Structural Diagnosis: Increased lumbar lordosis  Treatment Plan: Outpatient       PRENATAL LAB RESULTS:   Blood Type/Rh: A pos  Antibody Screen: negative  Hemoglobin, Hematocrit, Platelets: 31.0 / 69.0 / 253  Rubella: immune  T. Pallidum, IgG: non-reactive   Hepatitis B Surface Antigen: non-reactive   HIV: non-reactive   Sickle Cell Screen: not available  Gonorrhea: negative  Chlamydia: negative  Urine culture: negative, date: 19    1 hour Glucose Tolerance Test: 133  3 hour Glucose Tolerance Test: not done     Group B Strep: pending  Cystic Fibrosis Screen: negative  First Trimester Screen: low risk  MSAFP/Multiple Markers: not available  Non-Invasive Prenatal Testing: not available  Anatomy US: dichorionic/diamniotic twin gestation  [de-identified] A: normal female anatomy, posterior placenta, 3VC   Baby B: normal female anatomy, posterior placenta, 3VC    ASSESSMENT & PLAN:  Shandra Sandoval is a 24 y.o. female  at 36w0d IUP   - GBS pending / Rh positive / R immune   - No indication for GBS prophylaxis     [facility] Recommended Delivery 2/2 IUGR (Baby A)   - Admit to Labor and Delivery, under the service of Dr. Christine Hay   - cEFM and TOCO   - IVF (LR @ 125 cc/hr)   - CBC, T&S, T. Pall   - UA with reflex culture   - UDS ordered, Informed consent obtained. Discussed R/B/A. All questions and concerns answered. Patient verbalized understanding and agreement to plan.     - Ancef and bicitra ordered   - Anesthesia and NICU consulted   - C/S consent signed and on chart      Gestational Hypertension (newly diagnosed)   - Patient had several elevated blood pressures in the [facility] office (150s/90s) and had another elevated BP in the office yesterday (/90)   - PreE labs pending   - Denies s/s of preE    Spontaneous Dichorionic/Diamniotic twin gestation   - Following with [facility]    Hx C/S x1 (G1)   - Declines TOLAC    Abnormal 1h GTT (133)   - No 3h GTT   - HgbA1c pending    Asthma   - Stable with Albuterol inhaler PRN     HSV-2   - Patient reports most recent outbreak was a couple of weeks ago, and took Valtrex at that time   - Has not yet started Valtrex suppression therapy   - Denies prodromal s/s     Anxiety/Depression   - Stable, no medications    Morbid Obesity (BMI > 45)      Patient Active Problem List    Diagnosis Date Noted    Anxiety 2015     Priority: Medium    Depression 2015     Priority: Medium    Former tobacco use 2015     Priority: Low     Quit early in pregnancy      Rh+/RI/GBS pend 05/10/2019    Dichorionic diamniotic twin pregnancy in third trimester 2019    Pregnancy affected by fetal growth restriction 2019     Growth USN with MFM 4/3/19 showed EFW for baby A 1099g (36%ile) but with a lagging AC in the <3%ile. Growth appropriate for baby B at 1353 g( 57%ile). Scheduled at [facility] for growth USN q 3 weeks and will start  testing. [facility] recommendations for delivery at 38/0-38/6 based on di-di twins, but we may need to move this up with the finding of growth restriction (ACOG recommends 36/0-37/6) for di-di twins with isolated growth restriction.  Obesity affecting pregnancy in third trimester 2019     ASA 81 mg      Asthma affecting pregnancy in third trimester 2019    Urinary tract infection in mother during first trimester of pregnancy 2018     E. Coli UTI on urine cx 18. Will need test of cure at next appointment.  HSV-2 (herpes simplex virus 2) infection 2018    Hx C/S x1 (G1) 04/10/2015       Plan discussed with senior resident and Dr. Luke Kiran, who is agreeable. Steroids given this admission: No    Risks, benefits, alternatives and possible complications have been discussed in detail with the patient. Admission, and post admission procedures and expectations were discussed in detail. All questions were answered.     Attending's Name: Dr. Adilene Martinez DO  Ob/Gyn Resident  2019, 12:43 PM

## 2019-05-25 LAB
ABSOLUTE EOS #: <0.03 K/UL (ref 0–0.44)
ABSOLUTE IMMATURE GRANULOCYTE: 0.06 K/UL (ref 0–0.3)
ABSOLUTE LYMPH #: 1.31 K/UL (ref 1.1–3.7)
ABSOLUTE MONO #: 0.61 K/UL (ref 0.1–1.2)
BASOPHILS # BLD: 0 % (ref 0–2)
BASOPHILS ABSOLUTE: <0.03 K/UL (ref 0–0.2)
CULTURE: NORMAL
DIFFERENTIAL TYPE: ABNORMAL
EOSINOPHILS RELATIVE PERCENT: 0 % (ref 1–4)
ESTIMATED AVERAGE GLUCOSE: 111 MG/DL
HBA1C MFR BLD: 5.5 % (ref 4–6)
HCT VFR BLD CALC: 27.6 % (ref 36.3–47.1)
HEMOGLOBIN: 8.4 G/DL (ref 11.9–15.1)
IMMATURE GRANULOCYTES: 1 %
LYMPHOCYTES # BLD: 12 % (ref 24–43)
Lab: NORMAL
MCH RBC QN AUTO: 24.6 PG (ref 25.2–33.5)
MCHC RBC AUTO-ENTMCNC: 30.4 G/DL (ref 28.4–34.8)
MCV RBC AUTO: 80.9 FL (ref 82.6–102.9)
MONOCYTES # BLD: 6 % (ref 3–12)
NRBC AUTOMATED: 0 PER 100 WBC
PDW BLD-RTO: 14.1 % (ref 11.8–14.4)
PLATELET # BLD: 146 K/UL (ref 138–453)
PLATELET ESTIMATE: ABNORMAL
PMV BLD AUTO: 12.5 FL (ref 8.1–13.5)
RBC # BLD: 3.41 M/UL (ref 3.95–5.11)
RBC # BLD: ABNORMAL 10*6/UL
SEG NEUTROPHILS: 81 % (ref 36–65)
SEGMENTED NEUTROPHILS ABSOLUTE COUNT: 8.65 K/UL (ref 1.5–8.1)
SPECIMEN DESCRIPTION: NORMAL
WBC # BLD: 10.7 K/UL (ref 3.5–11.3)
WBC # BLD: ABNORMAL 10*3/UL

## 2019-05-25 PROCEDURE — 85025 COMPLETE CBC W/AUTO DIFF WBC: CPT

## 2019-05-25 PROCEDURE — 1220000000 HC SEMI PRIVATE OB R&B

## 2019-05-25 PROCEDURE — 36415 COLL VENOUS BLD VENIPUNCTURE: CPT

## 2019-05-25 PROCEDURE — 2580000003 HC RX 258: Performed by: STUDENT IN AN ORGANIZED HEALTH CARE EDUCATION/TRAINING PROGRAM

## 2019-05-25 PROCEDURE — 6370000000 HC RX 637 (ALT 250 FOR IP): Performed by: STUDENT IN AN ORGANIZED HEALTH CARE EDUCATION/TRAINING PROGRAM

## 2019-05-25 PROCEDURE — 6360000002 HC RX W HCPCS: Performed by: STUDENT IN AN ORGANIZED HEALTH CARE EDUCATION/TRAINING PROGRAM

## 2019-05-25 RX ORDER — ALBUTEROL SULFATE 90 UG/1
2 AEROSOL, METERED RESPIRATORY (INHALATION) EVERY 4 HOURS PRN
Status: DISCONTINUED | OUTPATIENT
Start: 2019-05-25 | End: 2019-05-28 | Stop reason: HOSPADM

## 2019-05-25 RX ADMIN — IBUPROFEN 800 MG: 800 TABLET ORAL at 20:17

## 2019-05-25 RX ADMIN — OXYCODONE HYDROCHLORIDE AND ACETAMINOPHEN 2 TABLET: 5; 325 TABLET ORAL at 20:17

## 2019-05-25 RX ADMIN — DOCUSATE SODIUM 100 MG: 100 CAPSULE, LIQUID FILLED ORAL at 08:29

## 2019-05-25 RX ADMIN — DOCUSATE SODIUM 100 MG: 100 CAPSULE, LIQUID FILLED ORAL at 20:17

## 2019-05-25 RX ADMIN — ENOXAPARIN SODIUM 40 MG: 40 INJECTION SUBCUTANEOUS at 12:00

## 2019-05-25 RX ADMIN — KETOROLAC TROMETHAMINE 30 MG: 30 INJECTION, SOLUTION INTRAMUSCULAR at 00:40

## 2019-05-25 RX ADMIN — DOCUSATE SODIUM 100 MG: 100 CAPSULE, LIQUID FILLED ORAL at 00:40

## 2019-05-25 RX ADMIN — KETOROLAC TROMETHAMINE 30 MG: 30 INJECTION, SOLUTION INTRAMUSCULAR at 06:41

## 2019-05-25 RX ADMIN — SODIUM CHLORIDE, POTASSIUM CHLORIDE, SODIUM LACTATE AND CALCIUM CHLORIDE: 600; 310; 30; 20 INJECTION, SOLUTION INTRAVENOUS at 00:42

## 2019-05-25 RX ADMIN — SODIUM CHLORIDE, POTASSIUM CHLORIDE, SODIUM LACTATE AND CALCIUM CHLORIDE: 600; 310; 30; 20 INJECTION, SOLUTION INTRAVENOUS at 06:43

## 2019-05-25 RX ADMIN — KETOROLAC TROMETHAMINE 30 MG: 30 INJECTION, SOLUTION INTRAMUSCULAR at 12:19

## 2019-05-25 ASSESSMENT — PAIN SCALES - GENERAL
PAINLEVEL_OUTOF10: 8
PAINLEVEL_OUTOF10: 5
PAINLEVEL_OUTOF10: 8

## 2019-05-25 NOTE — OP NOTE
scissors and blunt dissection were used to take the peritoneum down sharply. Peritoneal incision was extended longitudinally with blunt stretch, bladder retractor was placed. A low transverse uterine incision was made using a new scalpel blade. Blunt stretch on the hysterotomy incision was made and the amniotomy of Baby A  was performed revealing clear fluid. Delivered from cephalic presentation was a Live Born female infant. The infant was suctioned, dried and the umbilical cord was clamped and cut after one minute delayed cord clamping. The infant was taken to the warmer and attended by NICU for evaluation. A second section of cord was clamped and cut and sent for gases. Cord blood was obtained for evaluation. Attention turned to the delivery of Baby B. Amniotomy of Baby B was performed revealing clear fluid. Delivered from cephalic presentation was a Live Born female infant. The infant was suctioned, dried and the umbilical cord was clamped and cut after one minute delayed cord clamping. The infant was taken to the warmer and attended by NICU for evaluation. A second section of cord was clamped and cut and sent for gases. Cord blood was obtained for evaluation. The placentas were removed manually and appeared intact, whole and that the umbilical cords had three vessels noted. Pitocin was started. The uterine outline appeared normal. The uterus was not exteriorized and was cleaned of all clots and debris. The uterine incision was closed with running locked sutures of 0-Monocryl. Hemostasis was observed. An imbricating layer was placed with 0-Monocryl in running fashion. Bilateral abdominal gutters were cleared of all clots and debris. Bilateral tubes and ovaries were visualized and appeared normal. The hysterotomy was again inspected and found to be hemostatic. Rectus muscles were inspected and found to be hemostatic. Rectus muscle was reapproximated using interrupted 0 Vicryl suture.  The fascia was then reapproximated with running sutures of 0 Vicryl. The subcuticular space was irrigated copiously. The skin was reapproximated with a 4-0 Monocryl. The skin was then cleansed and dressed with a Prevena bandage in sterile fashion. Instrument, sponge, and needle counts were correct prior the abdominal closure and at the conclusion of the case. The urine remained clear throughout the case. Ancef was given for antibiotic prophylaxis. SCDs for DVT prophylaxis remain in place for the post operative period. Dr. Raymond Restrepo was present for the entire operation. Findings:  Live Born Baby A 4 lb 9 oz female infant in cephalic presentation with Apgars of 8 at 1 minute and 9 at five minutes, Live Born Baby B 5 lb 15 oz female infant in cephalic presentation with Apgars of 8 at 1 minute and 9 at five minutes, normal appearing uterus tubes and ovaries   Estimated Blood Loss: 800ml  Total IV Fluids: 2400 mL  Urine output: 100 clear urine   Drains:  jay catheter  Specimens:  placentas sent to pathology, cord blood and cord gases  Instrument and Sponge Count: Correct  Complications: none  Condition: Infants stable, transfered to NICU, Mother stable, transfer to post anesthesia recovery        Tonie Ho DO  OB/GYN Resident  5/25/2019, 9:33 AM    This dictation was performed by a resident physician and then was thoroughly reviewed by the attending prior to being signed.

## 2019-05-25 NOTE — FLOWSHEET NOTE
Post RPT C/S completed, pt transferred to NICU to visit baby and then to [facility] via stretcher. Condition stable.

## 2019-05-25 NOTE — PROGRESS NOTES
POST OPERATIVE DAY # 1    Fredrick Connelly is a 24 y.o. female   This patient was seen and examined today. RLTCS on 5/24/19    Her pregnancy was complicated by:   Patient Active Problem List   Diagnosis    Anxiety    Depression    Former tobacco use    Hx C/S x1 (G1)    HSV-2 (herpes simplex virus 2) infection    Urinary tract infection in mother during first trimester of pregnancy    Obesity affecting pregnancy in third trimester    Asthma affecting pregnancy in third trimester    Pregnancy affected by fetal growth restriction    Dichorionic diamniotic twin pregnancy in third trimester    Rh+/RI/GBS pend    RLTCS 5/24/19 F APG 8/9 Wt 4#9, F 8/9 Wt 5#15       Today she is doing well without any chief complaint. Her lochia is light. She denies chest pain, shortness of breath, headache, lightheadedness, blurred vision and peripheral edema. She is  breast feeding and she denies any signs or symptoms of mastitis. She is ambulating well around her room. She is voiding w/ a jay catheter. She currently denies S/S of postpartum depression. Flatus absent. Bowel movement absent. She is tolerating solids.     Vital Signs:  Vitals:    05/24/19 2200 05/24/19 2215 05/24/19 2330 05/25/19 0430   BP: (!) 142/56 (!) 147/78 (!) 124/56 129/74   Pulse: 53 59 (!) 47 58   Resp: 21 20 20 17   Temp:  98.7 °F (37.1 °C) 97.9 °F (36.6 °C) 98.4 °F (36.9 °C)   TempSrc:  Oral Oral Oral   SpO2: 100% 100% 98%          Urine Input & Output last 24hrs:     Intake/Output Summary (Last 24 hours) at 5/25/2019 6570  Last data filed at 5/25/2019 0430  Gross per 24 hour   Intake 2400 ml   Output 2750 ml   Net -350 ml       Physical Exam:  General:  no apparent distress, alert and cooperative  Neurologic:  alert, oriented, normal speech, no focal findings or movement disorder noted  Lungs:  No increased work of breathing, good air exchange, clear to auscultation bilaterally, no crackles or wheezing  Heart:  Regular rate and rhythm, normal S1 and S2, no S3 or S4, and no murmur noted    Abdomen: abdomen soft, non-distended, non-tender, bowel sounds present   Fundus: non-tender, firm, below umbilicus  Incision: Provena in place and functioning   Extremities:  no calf tenderness, non edematous, SCDs in place and functioning     Labs:  Lab Results   Component Value Date    WBC 6.4 2019    HGB 9.1 (L) 2019    HCT 29.3 (L) 2019    MCV 80.1 (L) 2019     2019       Assessment/Plan:  1. Mathew Avila is a  POD # 1 s/p RLTCS   - Doing well, VSS    - Female infants in NICU   - Encourage ambulation and use of incentive spirometer   - D/C jay catheter and saline lock IV on POD #1    - CBC awaiting   - Urine culture pending    - HgbA1C pending   2. Rh positive/Rubella immune  3. Breast feeding   - Denies s/s of mastitis   4. Reaction to Ancef  - Asymptomatic overnight   - Denies SOB and chest pain   5. PVCs during  Section   - Stat electrolytes ordered s/p  section and were wnl  - No tele monitoring per anesthesia recs   - Pt asymptomatic   6. PreE without Severe Features (BP, P/C ratio)  - Denies s/s of preeclampsia   - No severe range blood pressures overnight   - PreE labs wnl x1, P/C ratio 0.39  7. Asthma  - Asymptomatic   - Albuterol PRN  8. HSV II  - Denies prodromal s/s   - S/p valacyclovir prophylaxis   9. Anxiety and Depression  - Denies SI, HI and s/s of anxiety and depression   - Stable without medications   10. Anemia  - Asymptomatic   - CBC pending this morning   - Hgb on admission was 9.1  - Will give Rx for iron supplementation on discharge   11. BMI 51.5  - Provena in place and functioning   12. Continue post-op care. Counseling Completed:  Secondary Smoke risks and Sudden Infant Death Syndrome were reviewed with recommendations. Infant sleeping, \"back to sleep\" and avoidance of co-sleeping recommendations were reviewed. Signs and Symptoms of Post Partum Depression were reviewed.  The patient is to call if any occur. Signs and symptoms of Mastitis were reviewed. The patient is to call if any occur for follow up. Discharge instructions including pelvic rest, incision care, 15 lb weight restriction, no driving with pain medicine and office follow-up were reviewed with patient     Providers Name: Dr. Gene Godfery,   Ob/Gyn Resident  5/25/2019, 6:11 AM        Attending Physician Statement  I have discussed the care of Destiny Soares, including pertinent history and exam findings,  with the resident. I have reviewed the key elements of all parts of the encounter with the resident. Pt doing well POD#1 s/p RLTCS of di/di twins. Pt without any complaints of pain at this time. She is to have her jay removed soon, but had adequate output earlier. Baby girls doing well in NICU. I agree with the assessment, plan and orders as documented by the resident.   (Samantha Shanks)    Keith Parra MD

## 2019-05-25 NOTE — PLAN OF CARE
Problem: Discharge Planning:  Goal: Discharged to appropriate level of care  Description  Discharged to appropriate level of care  Outcome: Ongoing     Problem: Fluid Volume - Imbalance:  Goal: Absence of postpartum hemorrhage signs and symptoms  Description  Absence of postpartum hemorrhage signs and symptoms  Outcome: Ongoing  Goal: Absence of imbalanced fluid volume signs and symptoms  Description  Absence of imbalanced fluid volume signs and symptoms  Outcome: Ongoing     Problem: Infection - Surgical Site:  Goal: Will show no infection signs and symptoms  Description  Will show no infection signs and symptoms  Outcome: Ongoing     Problem: Mood - Altered:  Goal: Mood stable  Description  Mood stable  Outcome: Ongoing     Problem: Nausea/Vomiting:  Goal: Absence of nausea/vomiting  Description  Absence of nausea/vomiting  Outcome: Ongoing     Problem: Pain - Acute:  Goal: Pain level will decrease  Description  Pain level will decrease  Outcome: Ongoing     Problem: Urinary Retention:  Goal: Urinary elimination within specified parameters  Description  Urinary elimination within specified parameters  Outcome: Ongoing     Problem: Venous Thromboembolism:  Goal: Will show no signs or symptoms of venous thromboembolism  Description  Will show no signs or symptoms of venous thromboembolism  Outcome: Ongoing  Goal: Absence of signs or symptoms of impaired coagulation  Description  Absence of signs or symptoms of impaired coagulation  Outcome: Ongoing     Problem:  CARE  Goal: Vital signs are medically acceptable  Outcome: Ongoing  Goal: Thermoregulation maintained greater than 97/less than 99.4 Ax  Outcome: Ongoing  Goal: Infant exhibits minimal/reduced signs of pain/discomfort  Outcome: Ongoing  Goal: Infant is maintained in safe environment  Outcome: Ongoing  Goal: Baby is with Mother and family  Outcome: Ongoing

## 2019-05-25 NOTE — BRIEF OP NOTE
Department of Obstetrics and Gynecology  Obstetrical Brief Operative Report  [facility]    Patient: Mena Zendejas   : 1995  MRN: [id number]       Acct: [de-identified]   Date of Procedure: 19    Pre-operative Diagnosis: 24 y.o. female  at 36w0d    Newly Diagnosed Gestational HTN   Di/Di Twin Gestation   Hx CS x1   PreE without Severe Features   Asthma   HSV2   Anemia   BMI 51.5     Post-operative Diagnosis:    Same as above, live born infants     Procedure: repeat low transverse  section    Surgeon: Dr. Cunningham Honor): Lavern Neely PGY-3, Meek Collins PGY-1    Anesthesia: Spinal     Information for the patient's :  Ru [place] Girl A Wilber Reis [[id number]]   female  Birth Weight: 4 lb 9 oz (2.07 kg)  Information for the patient's :  Briana Ge [[id number]]   female  Birth Weight: N/A      Findings:  Live Born Baby A 4 lb 9 oz female infant in cephalic presentation with Apgars of 8 at 1 minute and 9 at five minutes, Live Born Baby B 5 lb 15 oz female infant in cephalic presentation with Apgars of 8 at 1 minute and 9 at five minutes, normal appearing uterus tubes and ovaries   Estimated Blood Loss: 800ml  Total IV Fluids: 2400 mL  Urine output: 100 clear urine   Drains:  jay catheter  Specimens:  placentas sent to pathology, cord blood and cord gases  Instrument and Sponge Count: Correct  Complications: none  Condition: Infants stable, transfered to NICU, Mother stable, transfer to post anesthesia recovery    See dictated operative report for full details.       Lisseth Sanders DO  Ob/Gyn Resident  2019, 9:19 PM

## 2019-05-26 PROCEDURE — 1220000000 HC SEMI PRIVATE OB R&B

## 2019-05-26 PROCEDURE — 6360000002 HC RX W HCPCS: Performed by: STUDENT IN AN ORGANIZED HEALTH CARE EDUCATION/TRAINING PROGRAM

## 2019-05-26 PROCEDURE — 6370000000 HC RX 637 (ALT 250 FOR IP): Performed by: STUDENT IN AN ORGANIZED HEALTH CARE EDUCATION/TRAINING PROGRAM

## 2019-05-26 RX ADMIN — OXYCODONE HYDROCHLORIDE AND ACETAMINOPHEN 2 TABLET: 5; 325 TABLET ORAL at 15:51

## 2019-05-26 RX ADMIN — DOCUSATE SODIUM 100 MG: 100 CAPSULE, LIQUID FILLED ORAL at 22:11

## 2019-05-26 RX ADMIN — OXYCODONE HYDROCHLORIDE AND ACETAMINOPHEN 2 TABLET: 5; 325 TABLET ORAL at 08:43

## 2019-05-26 RX ADMIN — OXYCODONE HYDROCHLORIDE AND ACETAMINOPHEN 2 TABLET: 5; 325 TABLET ORAL at 04:18

## 2019-05-26 RX ADMIN — IBUPROFEN 800 MG: 800 TABLET ORAL at 04:18

## 2019-05-26 RX ADMIN — IBUPROFEN 800 MG: 800 TABLET ORAL at 12:00

## 2019-05-26 RX ADMIN — DOCUSATE SODIUM 100 MG: 100 CAPSULE, LIQUID FILLED ORAL at 08:45

## 2019-05-26 RX ADMIN — OXYCODONE HYDROCHLORIDE AND ACETAMINOPHEN 2 TABLET: 5; 325 TABLET ORAL at 00:23

## 2019-05-26 RX ADMIN — OXYCODONE HYDROCHLORIDE AND ACETAMINOPHEN 2 TABLET: 5; 325 TABLET ORAL at 20:05

## 2019-05-26 RX ADMIN — IBUPROFEN 800 MG: 800 TABLET ORAL at 20:05

## 2019-05-26 RX ADMIN — ENOXAPARIN SODIUM 40 MG: 40 INJECTION SUBCUTANEOUS at 08:45

## 2019-05-26 ASSESSMENT — PAIN SCALES - GENERAL
PAINLEVEL_OUTOF10: 8
PAINLEVEL_OUTOF10: 8
PAINLEVEL_OUTOF10: 6
PAINLEVEL_OUTOF10: 8
PAINLEVEL_OUTOF10: 6

## 2019-05-26 NOTE — PROGRESS NOTES
POST OPERATIVE DAY # 2    Johnna Jennings is a 24 y.o. female   This patient was seen and examined today. S/p RLTCS on 5/24/19    Her pregnancy was complicated by:   Patient Active Problem List   Diagnosis    Anxiety    Depression    Former tobacco use    Hx C/S x1 (G1)    HSV-2 (herpes simplex virus 2) infection    Urinary tract infection in mother during first trimester of pregnancy    Obesity affecting pregnancy in third trimester    Asthma affecting pregnancy in third trimester    Pregnancy affected by fetal growth restriction    Dichorionic diamniotic twin pregnancy in third trimester    Rh+/RI/GBS pend    RLTCS 5/24/19 F APG 8/9 Wt 4#9, F 8/9 Wt 5#15       Today she is doing well without any chief complaint. Her lochia is light. She denies chest pain, shortness of breath, headache and blurred vision. She is  breast feeding and she denies any signs or symptoms of mastitis. She is ambulating well. She is voiding without difficulty. She currently denies S/S of postpartum depression. Flatus present. Bowel movement absent. She is tolerating solids.     Vital Signs:  Vitals:    05/25/19 1600 05/25/19 2014 05/26/19 0021 05/26/19 0419   BP: 117/67 (!) 147/75 134/69 (!) 151/90   Pulse: 67 (!) 47 51 53   Resp: 17 17 18 17   Temp: 98.4 °F (36.9 °C) 98.1 °F (36.7 °C) 98.1 °F (36.7 °C) 98.1 °F (36.7 °C)   TempSrc: Oral Oral Oral Oral   SpO2:           Urine Input & Output last 24hrs:     Intake/Output Summary (Last 24 hours) at 5/26/2019 2142  Last data filed at 5/25/2019 1200  Gross per 24 hour   Intake --   Output 250 ml   Net -250 ml       Physical Exam:  General:  no apparent distress, alert and cooperative  Neurologic:  alert, oriented, normal speech, no focal findings or movement disorder noted  Lungs:  No increased work of breathing, good air exchange, clear to auscultation bilaterally, no crackles or wheezing  Heart:  regular rate and rhythm    Abdomen: abdomen soft, non-distended, non-tender  Fundus: non-tender, normal size, firm, below umbilicus  Incision: Provena dressing in place and functioning properly  Extremities:  no calf tenderness, non edematous    Labs:  Lab Results   Component Value Date    WBC 10.7 2019    HGB 8.4 (L) 2019    HCT 27.6 (L) 2019    MCV 80.9 (L) 2019     2019       Assessment/Plan:  1. Lawson Mom is a  POD # 2 s/p RLTCS   - Doing well, VSS    - female infants in NICU   - Encourage ambulation and use of incentive spirometer   - D/C jay catheter and saline lock IV on POD #1    - CBC completed POD #1   - Motrin/percocet  2. Rh positive/Rubella immune  3. Breast feeding    - Denies s/s of mastitis  4. Allergic reaction to Ancef    - reaction prior to delivery   - responded well to benadryl   - no itching, rashes, or shortness of breath overnight  5. PVCs during  section   - Stat electrolytes wnl post delivery   - no tele monitoring per anesthsia   - Pt has been asymptomatic with no complaints   - VSS  6. PreE w/o SF's (BP, P/C ratio)   - Denies s/s of preeclampsia including HA, VC, RUQ pain or worsening swelling   - BP elevated episodically non-severe range overnight   - PreE labs WNL x1, P/C ratio 0.39  7. Asthma   - controlled, albuterol PRN   - denies current s/s  8. Anemia   - hgb 9.1 on admission and 8.4 on POD#1   - Continue PO iron supplementation   - pt denies s/s of anemia  9. Continue post-op care. Counseling Completed:  Secondary Smoke risks and Sudden Infant Death Syndrome were reviewed with recommendations. Infant sleeping, \"back to sleep\" and avoidance of co-sleeping recommendations were reviewed. Signs and Symptoms of Post Partum Depression were reviewed. The patient is to call if any occur. Signs and symptoms of Mastitis were reviewed. The patient is to call if any occur for follow up.   Discharge instructions including pelvic rest, incision care, 15 lb weight restriction, no driving with pain medicine and office follow-up were reviewed with patient     Providers Name: Dr. Tonie Kenny DO  Ob/Gyn Resident  5/26/2019, 5:52 AM         Attending Physician Statement  I have discussed the care of Sebas Wallace, including pertinent history and exam findings, with the resident. I have reviewed the key elements of all parts of the encounter with the resident. Pt doing well POD#2 s/p RLTCS. Pt without any complaints. Baby girls in NICU doing well. Pt would like to stay in the hospital until POD#4. I agree with the assessment, plan and orders as documented by the resident.   (Don Powell)    Gracie Melgar MD

## 2019-05-26 NOTE — PLAN OF CARE
Problem: Discharge Planning:  Goal: Discharged to appropriate level of care  Description  Discharged to appropriate level of care  Outcome: Ongoing     Problem: Fluid Volume - Imbalance:  Goal: Absence of postpartum hemorrhage signs and symptoms  Description  Absence of postpartum hemorrhage signs and symptoms  Outcome: Ongoing  Goal: Absence of imbalanced fluid volume signs and symptoms  Description  Absence of imbalanced fluid volume signs and symptoms  Outcome: Ongoing     Problem: Infection - Surgical Site:  Goal: Will show no infection signs and symptoms  Description  Will show no infection signs and symptoms  Outcome: Ongoing     Problem: Mood - Altered:  Goal: Mood stable  Description  Mood stable  Outcome: Ongoing     Problem: Nausea/Vomiting:  Goal: Absence of nausea/vomiting  Description  Absence of nausea/vomiting  Outcome: Ongoing     Problem: Pain - Acute:  Goal: Pain level will decrease  Description  Pain level will decrease  Outcome: Ongoing     Problem: Urinary Retention:  Goal: Urinary elimination within specified parameters  Description  Urinary elimination within specified parameters  Outcome: Ongoing     Problem: Venous Thromboembolism:  Goal: Will show no signs or symptoms of venous thromboembolism  Description  Will show no signs or symptoms of venous thromboembolism  Outcome: Ongoing  Goal: Absence of signs or symptoms of impaired coagulation  Description  Absence of signs or symptoms of impaired coagulation  Outcome: Ongoing     Problem:  CARE  Goal: Vital signs are medically acceptable  Outcome: Ongoing  Goal: Thermoregulation maintained greater than 97/less than 99.4 Ax  Outcome: Ongoing  Goal: Infant exhibits minimal/reduced signs of pain/discomfort  Outcome: Ongoing  Goal: Infant is maintained in safe environment  Outcome: Ongoing  Goal: Baby is with Mother and family  Outcome: Ongoing     Problem: Pain:  Goal: Pain level will decrease  Description  Pain level will decrease  Outcome: Ongoing  Goal: Control of acute pain  Description  Control of acute pain  Outcome: Ongoing  Goal: Control of chronic pain  Description  Control of chronic pain  Outcome: Ongoing

## 2019-05-27 VITALS
HEART RATE: 65 BPM | SYSTOLIC BLOOD PRESSURE: 135 MMHG | RESPIRATION RATE: 18 BRPM | DIASTOLIC BLOOD PRESSURE: 81 MMHG | OXYGEN SATURATION: 98 % | TEMPERATURE: 98.7 F

## 2019-05-27 PROCEDURE — 6360000002 HC RX W HCPCS: Performed by: STUDENT IN AN ORGANIZED HEALTH CARE EDUCATION/TRAINING PROGRAM

## 2019-05-27 PROCEDURE — 6370000000 HC RX 637 (ALT 250 FOR IP): Performed by: STUDENT IN AN ORGANIZED HEALTH CARE EDUCATION/TRAINING PROGRAM

## 2019-05-27 PROCEDURE — 1220000000 HC SEMI PRIVATE OB R&B

## 2019-05-27 RX ORDER — IBUPROFEN 800 MG/1
800 TABLET ORAL EVERY 8 HOURS PRN
Qty: 30 TABLET | Refills: 0 | Status: SHIPPED | OUTPATIENT
Start: 2019-05-27

## 2019-05-27 RX ORDER — OXYCODONE HYDROCHLORIDE AND ACETAMINOPHEN 5; 325 MG/1; MG/1
1 TABLET ORAL EVERY 6 HOURS PRN
Qty: 27 TABLET | Refills: 0 | Status: SHIPPED | OUTPATIENT
Start: 2019-05-27 | End: 2019-06-01

## 2019-05-27 RX ORDER — PSEUDOEPHEDRINE HCL 30 MG
100 TABLET ORAL 2 TIMES DAILY PRN
Qty: 60 CAPSULE | Refills: 1 | Status: SHIPPED | OUTPATIENT
Start: 2019-05-27 | End: 2019-06-26 | Stop reason: ALTCHOICE

## 2019-05-27 RX ADMIN — IBUPROFEN 800 MG: 800 TABLET ORAL at 04:21

## 2019-05-27 RX ADMIN — OXYCODONE HYDROCHLORIDE AND ACETAMINOPHEN 2 TABLET: 5; 325 TABLET ORAL at 00:57

## 2019-05-27 RX ADMIN — OXYCODONE HYDROCHLORIDE AND ACETAMINOPHEN 2 TABLET: 5; 325 TABLET ORAL at 04:21

## 2019-05-27 RX ADMIN — DOCUSATE SODIUM 100 MG: 100 CAPSULE, LIQUID FILLED ORAL at 08:57

## 2019-05-27 RX ADMIN — IBUPROFEN 800 MG: 800 TABLET ORAL at 15:42

## 2019-05-27 RX ADMIN — ENOXAPARIN SODIUM 40 MG: 40 INJECTION SUBCUTANEOUS at 08:57

## 2019-05-27 ASSESSMENT — PAIN SCALES - GENERAL
PAINLEVEL_OUTOF10: 8
PAINLEVEL_OUTOF10: 6
PAINLEVEL_OUTOF10: 5

## 2019-05-27 NOTE — PROGRESS NOTES
Discharge instructions given and signed. Patient voices understanding of all discharge instructions. All questions encouraged and answered.

## 2019-05-27 NOTE — PROGRESS NOTES
tenderness, non edematous    Labs:  Lab Results   Component Value Date    WBC 10.7 2019    HGB 8.4 (L) 2019    HCT 27.6 (L) 2019    MCV 80.9 (L) 2019     2019       Assessment/Plan:  1. Audrey Cuadra is a [id number] POD #3 s/p RLTCS        - Doing well, VSS    - Baby A in [address]   - Baby B in NICU               - Encourage ambulation and use of incentive spirometer  2. Rh positive/Rubella immune  3. Breast feeding   - Denies s/s of mastitis   4. Reaction to Ancef  - Asymptomatic overnight   - Denies SOB, pruritis and chest pain   5. PVCs during  Section   - Stat electrolytes ordered s/p  section and were wnl  - No tele monitoring per anesthesia recs   - Pt asymptomatic w/ no complaints   6. PreE without Severe Features (BP, P/C ratio)  - Denies s/s of preeclampsia   - No severe range blood pressures overnight   - PreE labs wnl x1, P/C ratio 0.39  7. Asthma  - Asymptomatic   - Albuterol PRN  8. HSV II  - Denies prodromal s/s   - S/p valacyclovir prophylaxis   9. Anxiety and Depression  - Denies SI, HI and s/s of anxiety and depression   - Stable without medications   10. Anemia  - Asymptomatic   - Hgb 9.1>8.4  - Rx for iron supplementation on discharge   11. BMI 51.5  - Provena in place and functioning  12. Continue post-op care. 13. Anticipate discharge home POD#4       Counseling Completed:  Secondary Smoke risks and Sudden Infant Death Syndrome were reviewed with recommendations. Infant sleeping, \"back to sleep\" and avoidance of co-sleeping recommendations were reviewed. Signs and Symptoms of Post Partum Depression were reviewed. The patient is to call if any occur. Signs and symptoms of Mastitis were reviewed. The patient is to call if any occur for follow up.   Discharge instructions including pelvic rest, incision care, 15 lb weight restriction, no driving with pain medicine and office follow-up were reviewed with patient     Providers Name:  [address] Avina DO  Ob/Gyn Resident  5/27/2019, 5:11 AM

## 2019-05-27 NOTE — PROGRESS NOTES
Date: 5/27/2019  Time: 12:48 PM    Ob/Gyn Attending Physician Statement  I have discussed the care of Cody Evans, including pertinent history and exam findings,  with the resident. I have reviewed their note in the electronic medical record. I have seen and examined the patient and the key elements of all parts of the encounter have been performed/reviewed by me . I agree with the assessment, plan and orders as documented by the resident. Vitals:    05/26/19 1646 05/26/19 2002 05/27/19 0421 05/27/19 0855   BP:  (!) 146/89 138/88 (!) 153/90   Pulse:  79 55 70   Resp:  18 17 18   Temp:  98.2 °F (36.8 °C) 98.6 °F (37 °C) 98.2 °F (36.8 °C)   TempSrc:  Axillary Axillary Oral   SpO2: 98%        Both babies are now out of the NICU, so she wants to go home tonight (after the culture results return for babyB)  She has no complaints.   Discussed discharge instructions  RV 1 week to remove the dressing    Attending's Name: Katie Thomas M.D.

## 2019-05-27 NOTE — PROGRESS NOTES
FOB at nurses station requesting to let mom sleep until meds and to hold the baby in the nursery. Voices no further c/o. Mom asleep in room.

## 2019-05-28 NOTE — ANESTHESIA POSTPROCEDURE EVALUATION
Department of Anesthesiology  Postprocedure Note    Patient: Meg Gordon  MRN: [id number]  [place]: 1995  Date of evaluation: 2019  Time:  8:21 AM     Procedure Summary     Date:  19 Room / Location:  [facility] L&D OR [address] [place] [address] L&D OR    Anesthesia Start:   Anesthesia Stop:      Procedure:   SECTION (Bilateral Abdomen) Diagnosis:  (Baby B IUGR, GHTN)    Surgeon:  Lit Jean-Baptiste MD Responsible Provider:  Pamela Cox MD    Anesthesia Type:  general, spinal ASA Status:  3 - Emergent          Anesthesia Type: No value filed. Cathie Phase I: Cathie Score: 10    Cathie Phase II:      Last vitals: Reviewed and per EMR flowsheets.        Anesthesia Post Evaluation   Vital Signs (Current)   Vitals:    19 1610   BP: 135/81   Pulse: 65   Resp: 18   Temp: 98.7 °F (37.1 °C)   SpO2:      Vital Signs Statistics (for past 48 hrs)     Temp  Av.3 °F (36.8 °C)  Min: 97.9 °F (36.6 °C)   Min taken time: 19 1552  Max: 98.7 °F (37.1 °C)   Max taken time: 19 1610  Pulse  Av.5  Min: 50   Min taken time: 19 1552  Max: 78   Max taken time: 19 2002  Resp  Av.7  Min: 16   Min taken time: 19 0421  Max: 18   Max taken time: 19 1610  BP  Min: 124/78   Min taken time: 19 1200  Max: 153/90   Max taken time: 19 0855  SpO2  Av %  Min: 98 %   Min taken time: 19 1646  Max: 98 %   Max taken time: 19 1646    BP Readings from Last 3 Encounters:   19 135/81   19 128/62   19 (!) 151/90             Level of Consciousness:  Awake    Respiratory:  Stable    Airway :   Patent    Oxygen Saturation:  Stable    Cardiovascular:  Stable    Hydration:  Adequate    PONV:  Stable    Post-op Pain:  Adequate analgesia    Post-op Assessment:  No apparent anesthetic complications: patient was evaluated post procedure    Additional Follow-Up / Treatment / Comment:  None

## 2019-05-29 LAB — SURGICAL PATHOLOGY REPORT: NORMAL

## 2019-05-30 ENCOUNTER — POSTPARTUM VISIT (OUTPATIENT)
Dept: OBGYN CLINIC | Age: 24
End: 2019-05-30

## 2019-05-30 VITALS
BODY MASS INDEX: 48.82 KG/M2 | HEIGHT: 65 IN | SYSTOLIC BLOOD PRESSURE: 144 MMHG | DIASTOLIC BLOOD PRESSURE: 80 MMHG | WEIGHT: 293 LBS

## 2019-05-30 PROCEDURE — 99024 POSTOP FOLLOW-UP VISIT: CPT | Performed by: OBSTETRICS & GYNECOLOGY

## 2019-05-30 PROCEDURE — G8417 CALC BMI ABV UP PARAM F/U: HCPCS | Performed by: OBSTETRICS & GYNECOLOGY

## 2019-05-30 PROCEDURE — 1111F DSCHRG MED/CURRENT MED MERGE: CPT | Performed by: OBSTETRICS & GYNECOLOGY

## 2019-05-30 PROCEDURE — 1036F TOBACCO NON-USER: CPT | Performed by: OBSTETRICS & GYNECOLOGY

## 2019-05-30 PROCEDURE — G8428 CUR MEDS NOT DOCUMENT: HCPCS | Performed by: OBSTETRICS & GYNECOLOGY

## 2019-05-30 NOTE — PROGRESS NOTES
The patient was seen for her exam two weeks after  section. [id number]. She has no complaints today. She delivered 2 girls on  by , done because of twins, gest hypertension and IUGR. She is not breast feeding and there are not signs/symptoms of mastitis. Today her lochia is light she denies any dizziness or shortness of breath. Her pregnancy was complicated by IUGR, twins and gestational hypertension. She does not have any signs or symptoms of post partum depression. She does admit to having good home support. Her bowels are regular and shedenies any urinary tract symptomology. Her choice for birth control is still in question. Blood pressure (!) 144/80, height 5' 4.5\" (1.638 m), weight 296 lb (134.3 kg), last menstrual period 2018, not currently breastfeeding. Abdomen: Soft and non-tender; good bowel sounds; no guarding, rebound or rigidity; no CVA tenderness bilaterally. Incision clean and dry  Suction dressing removed. Extremities: No calf tenderness bilaterally. DTR 2/4 bilaterally. No edema. Assessment:   Diagnosis Orders   1. Postpartum care following  delivery       Chief Complaint   Patient presents with    Postpartum Care     1 week post C sec 19 @ [facility] with Dr Christine Hay @ 36w   2 girls     Other     pumping breasts             Plan:  1. Return to the office in  3-4 weeks  2. Signs & Symptoms of mastitis reviewed; notify if occurs  3. Secondary smoke risks reviewed. Increased risks of respiratory problems, Sudden  infant death syndrome, and potential malignancies. 4. Abstinence  5.  Family planning counseling and STD counseling completed

## 2019-06-26 ENCOUNTER — POSTPARTUM VISIT (OUTPATIENT)
Dept: OBGYN CLINIC | Age: 24
End: 2019-06-26
Payer: MEDICARE

## 2019-06-26 VITALS
WEIGHT: 264.2 LBS | DIASTOLIC BLOOD PRESSURE: 70 MMHG | HEIGHT: 65 IN | SYSTOLIC BLOOD PRESSURE: 126 MMHG | BODY MASS INDEX: 44.02 KG/M2

## 2019-06-26 PROCEDURE — G8427 DOCREV CUR MEDS BY ELIG CLIN: HCPCS | Performed by: OBSTETRICS & GYNECOLOGY

## 2019-06-26 PROCEDURE — 1111F DSCHRG MED/CURRENT MED MERGE: CPT | Performed by: OBSTETRICS & GYNECOLOGY

## 2019-06-26 PROCEDURE — 1036F TOBACCO NON-USER: CPT | Performed by: OBSTETRICS & GYNECOLOGY

## 2019-06-26 PROCEDURE — 99213 OFFICE O/P EST LOW 20 MIN: CPT | Performed by: OBSTETRICS & GYNECOLOGY

## 2019-06-26 PROCEDURE — G8417 CALC BMI ABV UP PARAM F/U: HCPCS | Performed by: OBSTETRICS & GYNECOLOGY

## 2019-06-26 NOTE — PROGRESS NOTES
The patient was seen for her exam six weeks after  section. . She has no complaints today. She delivered 2 girls on  by , done because of twin gestation. She is  breast feeding but also supplementing and there are not any signs or symptoms of mastitis. Today her lochia is light she denies any dizziness or shortness of breath. Her pregnancy was complicated by twin gestation (di/di). She does not have any signs or symptoms of post partum depression. She does admit to having good home support. Her bowels are regular and shedenies any urinary tract symptomology. Her choice for birth control is condoms/foam.     1. Postpartum care following  delivery       Patient Active Problem List   Diagnosis    Anxiety    Depression    Former tobacco use    Hx C/S x1 (G1)    HSV-2 (herpes simplex virus 2) infection    Urinary tract infection in mother during first trimester of pregnancy    Obesity affecting pregnancy in third trimester    Asthma affecting pregnancy in third trimester    Pregnancy affected by fetal growth restriction    Dichorionic diamniotic twin pregnancy in third trimester    Rh+/RI/GBS pend    RLTCS 19 F APG 8/9 Wt 4#9, F 8/9 Wt 5#15       Past Medical History:   Diagnosis Date    Anemia     Anxiety 2015    Asthma     inhaler prn    Asthma affecting pregnancy in second trimester 2019    Back pain 2011    Depression 2015    HSV infection 2018    Neck pain 2011       Past Surgical History:   Procedure Laterality Date     SECTION Bilateral 2019    TWINS     SECTION, LOW TRANSVERSE  2015       Social History     Tobacco Use   Smoking Status Former Smoker    Types: Cigars   Smokeless Tobacco Never Used     Social History     Substance and Sexual Activity   Alcohol Use No       MEDICATIONS:  Current Outpatient Medications   Medication Sig Dispense Refill    ibuprofen (ADVIL;MOTRIN) 800 MG tablet Take thyroid was not enlarged and had no masses. Perrla  Cardiovascular: The lungs were auscultated and found to be clear. There were no rales, rhonchi or wheezes. There was a good respiratory effort. The heart was in a regular rate and rhythm. There was no murmur appreciated. The patients extremities were without calf tenderness, edema, or varicosities. Abdomen: The abdomen was soft and non-tender. There were good bowel sounds in all quadrants and there was no guarding, rebound or rigidity. On evaluation there was no evidence of hepatosplenomegaly and there was no costal vertebral karl tenderness bilaterally. No hernias were appreciated. The incision is well healed, without discharge or unusual induration  Psych: The patient had a normal Orientation to: Time, Place, Person, and Situation  There is no Mood / Affect changes  I finds no signs of postpartum depression  Breast:  (Chest)  The patients breasts were symmetrical.  There was no masses, discharge or retractions bilateral.  Both nipples were everted. Self breast exams were reviewed in detail. Literature was given. Pelvic Exam:  The external genitalia was without lesions or masses with a normal appearance. The vaginal vault was normal. There were no Cystocele, Rectocele, or enterocele appreciated. There was no vaginal discharge. The urethra is in a normal anatomic position. No masses or tenderness were noted. The bladder was smooth without masses, fullness, or tenderness. The cervix was without lesions. There was no cervical motion tenderness. The uterus has returned to normal size. It was mobile, midline and regular. The  adnexa were palpated and noted no fullness, tenderness or masses in either region. Rectal Exam:  Noted good sphincter tone and no masses. Musculoskeletal:  Normal Gait and station was noted. Digits were evaluated without abnormal findings.   Range of motion, stability and strength were evaluated and found to be appropriate for the patients age. Assessment:   Diagnosis Orders   1. Postpartum care following  delivery       Chief Complaint   Patient presents with    Postpartum Care     4 week post C sec 19 @ [facility] with Dr Beatriz Forman @ 36w   2 girls     Other     Seen as new patient 11/15/18. Pap was neg    Medication Refill     valtrex       Plan:  1. Return to the office for annual exam  2. Signs & Symptoms of mastitis reviewed; notify if occurs  3. Secondary smoke risks reviewed. Increased risks of respiratory problems, Sudden     infant death syndrome, and potential malignancies. 4. We discussed  sections and future pregnancy outcomes  5.  Birth control not desired

## 2019-07-03 ENCOUNTER — TELEPHONE (OUTPATIENT)
Dept: OBGYN CLINIC | Age: 24
End: 2019-07-03

## 2019-07-03 RX ORDER — VALACYCLOVIR HYDROCHLORIDE 500 MG/1
500 TABLET, FILM COATED ORAL DAILY
Qty: 30 TABLET | Refills: 11 | Status: SHIPPED | OUTPATIENT
Start: 2019-07-03 | End: 2019-08-02

## 2019-11-07 ENCOUNTER — HOSPITAL ENCOUNTER (EMERGENCY)
Age: 24
Discharge: HOME | End: 2019-11-07
Attending: EMERGENCY MEDICINE
Payer: COMMERCIAL

## 2019-11-07 ENCOUNTER — APPOINTMENT (OUTPATIENT)
Dept: ULTRASOUND IMAGING | Age: 24
End: 2019-11-07
Payer: COMMERCIAL

## 2019-11-07 ENCOUNTER — TELEPHONE (OUTPATIENT)
Dept: OBGYN CLINIC | Age: 24
End: 2019-11-07

## 2019-11-07 VITALS
SYSTOLIC BLOOD PRESSURE: 148 MMHG | RESPIRATION RATE: 18 BRPM | TEMPERATURE: 97.9 F | OXYGEN SATURATION: 99 % | WEIGHT: 245 LBS | HEIGHT: 65 IN | DIASTOLIC BLOOD PRESSURE: 84 MMHG | BODY MASS INDEX: 40.82 KG/M2 | HEART RATE: 84 BPM

## 2019-11-07 DIAGNOSIS — Z34.90 PREGNANCY WITH GESTATION OF UNKNOWN DURATION: Primary | ICD-10-CM

## 2019-11-07 LAB
-: ABNORMAL
ABSOLUTE EOS #: 0.13 K/UL (ref 0–0.44)
ABSOLUTE IMMATURE GRANULOCYTE: 0.03 K/UL (ref 0–0.3)
ABSOLUTE LYMPH #: 2.46 K/UL (ref 1.1–3.7)
ABSOLUTE MONO #: 0.41 K/UL (ref 0.1–1.2)
AMORPHOUS: ABNORMAL
BACTERIA: ABNORMAL
BASOPHILS # BLD: 1 % (ref 0–2)
BASOPHILS ABSOLUTE: 0.04 K/UL (ref 0–0.2)
BILIRUBIN URINE: NEGATIVE
CASTS UA: ABNORMAL /LPF (ref 0–8)
COLOR: YELLOW
CRYSTALS, UA: ABNORMAL /HPF
DIFFERENTIAL TYPE: ABNORMAL
EOSINOPHILS RELATIVE PERCENT: 2 % (ref 1–4)
EPITHELIAL CELLS UA: ABNORMAL /HPF (ref 0–5)
GLUCOSE URINE: NEGATIVE
HCG QUANTITATIVE: 6795 IU/L
HCT VFR BLD CALC: 37.5 % (ref 36.3–47.1)
HEMOGLOBIN: 12.1 G/DL (ref 11.9–15.1)
IMMATURE GRANULOCYTES: 0 %
KETONES, URINE: NEGATIVE
LEUKOCYTE ESTERASE, URINE: NEGATIVE
LYMPHOCYTES # BLD: 31 % (ref 24–43)
MCH RBC QN AUTO: 28.1 PG (ref 25.2–33.5)
MCHC RBC AUTO-ENTMCNC: 32.3 G/DL (ref 28.4–34.8)
MCV RBC AUTO: 87 FL (ref 82.6–102.9)
MONOCYTES # BLD: 5 % (ref 3–12)
MUCUS: ABNORMAL
NITRITE, URINE: NEGATIVE
NRBC AUTOMATED: 0 PER 100 WBC
OTHER OBSERVATIONS UA: ABNORMAL
PDW BLD-RTO: 15.8 % (ref 11.8–14.4)
PH UA: 6.5 (ref 5–8)
PLATELET # BLD: 262 K/UL (ref 138–453)
PLATELET ESTIMATE: ABNORMAL
PMV BLD AUTO: 11.1 FL (ref 8.1–13.5)
PROTEIN UA: NEGATIVE
RBC # BLD: 4.31 M/UL (ref 3.95–5.11)
RBC # BLD: ABNORMAL 10*6/UL
RBC UA: ABNORMAL /HPF (ref 0–4)
RENAL EPITHELIAL, UA: ABNORMAL /HPF
SEG NEUTROPHILS: 61 % (ref 36–65)
SEGMENTED NEUTROPHILS ABSOLUTE COUNT: 4.94 K/UL (ref 1.5–8.1)
SPECIFIC GRAVITY UA: 1.01 (ref 1–1.03)
TRICHOMONAS: ABNORMAL
TURBIDITY: ABNORMAL
URINE HGB: NEGATIVE
UROBILINOGEN, URINE: NORMAL
WBC # BLD: 8 K/UL (ref 3.5–11.3)
WBC # BLD: ABNORMAL 10*3/UL
WBC UA: ABNORMAL /HPF (ref 0–5)
YEAST: ABNORMAL

## 2019-11-07 PROCEDURE — 81001 URINALYSIS AUTO W/SCOPE: CPT

## 2019-11-07 PROCEDURE — 99284 EMERGENCY DEPT VISIT MOD MDM: CPT

## 2019-11-07 PROCEDURE — 85025 COMPLETE CBC W/AUTO DIFF WBC: CPT

## 2019-11-07 PROCEDURE — 84702 CHORIONIC GONADOTROPIN TEST: CPT

## 2019-11-07 PROCEDURE — 87086 URINE CULTURE/COLONY COUNT: CPT

## 2019-11-07 PROCEDURE — 76817 TRANSVAGINAL US OBSTETRIC: CPT

## 2019-11-07 ASSESSMENT — ENCOUNTER SYMPTOMS
COUGH: 0
ABDOMINAL PAIN: 1
ABDOMINAL DISTENTION: 0
VOMITING: 0
SORE THROAT: 0
WHEEZING: 0
BACK PAIN: 0
TROUBLE SWALLOWING: 0
NAUSEA: 0
SHORTNESS OF BREATH: 0

## 2019-11-08 LAB
CULTURE: NORMAL
Lab: NORMAL
SPECIMEN DESCRIPTION: NORMAL

## 2019-11-14 ENCOUNTER — PROCEDURE VISIT (OUTPATIENT)
Dept: OBGYN CLINIC | Age: 24
End: 2019-11-14
Payer: COMMERCIAL

## 2019-11-14 VITALS
WEIGHT: 278.4 LBS | SYSTOLIC BLOOD PRESSURE: 120 MMHG | HEIGHT: 65 IN | DIASTOLIC BLOOD PRESSURE: 66 MMHG | BODY MASS INDEX: 46.38 KG/M2

## 2019-11-14 DIAGNOSIS — Z30.430 VISIT FOR INSERTION OF INTRAUTERINE DEVICE: Primary | ICD-10-CM

## 2019-11-14 PROCEDURE — 58300 INSERT INTRAUTERINE DEVICE: CPT | Performed by: OBSTETRICS & GYNECOLOGY

## 2019-11-18 DIAGNOSIS — Z30.430 VISIT FOR INSERTION OF INTRAUTERINE DEVICE: ICD-10-CM

## 2020-01-08 ENCOUNTER — OFFICE VISIT (OUTPATIENT)
Dept: OBGYN CLINIC | Age: 25
End: 2020-01-08

## 2020-01-08 VITALS
WEIGHT: 275 LBS | SYSTOLIC BLOOD PRESSURE: 132 MMHG | DIASTOLIC BLOOD PRESSURE: 82 MMHG | HEIGHT: 65 IN | BODY MASS INDEX: 45.82 KG/M2

## 2020-01-08 PROCEDURE — G8427 DOCREV CUR MEDS BY ELIG CLIN: HCPCS | Performed by: OBSTETRICS & GYNECOLOGY

## 2020-01-08 PROCEDURE — 99024 POSTOP FOLLOW-UP VISIT: CPT | Performed by: OBSTETRICS & GYNECOLOGY

## 2020-01-08 PROCEDURE — G8484 FLU IMMUNIZE NO ADMIN: HCPCS | Performed by: OBSTETRICS & GYNECOLOGY

## 2020-01-08 PROCEDURE — 1036F TOBACCO NON-USER: CPT | Performed by: OBSTETRICS & GYNECOLOGY

## 2020-01-08 PROCEDURE — G8417 CALC BMI ABV UP PARAM F/U: HCPCS | Performed by: OBSTETRICS & GYNECOLOGY

## 2020-01-08 RX ORDER — DEXTROAMPHETAMINE SACCHARATE, AMPHETAMINE ASPARTATE, DEXTROAMPHETAMINE SULFATE AND AMPHETAMINE SULFATE 2.5; 2.5; 2.5; 2.5 MG/1; MG/1; MG/1; MG/1
10 TABLET ORAL DAILY
COMMUNITY

## 2020-01-08 NOTE — PROGRESS NOTES
Protein, UA TRACE (A) NEGATIVE    Urobilinogen, Urine Normal Normal    Nitrite, Urine NEGATIVE NEGATIVE    Leukocyte Esterase, Urine NEGATIVE NEGATIVE    -          WBC, UA 0 TO 2 0 - 5 /HPF    RBC, UA 0 TO 2 0 - 4 /HPF    Casts UA  0 - 8 /LPF     5 TO 10 HYALINE Reference range defined for non-centrifuged specimen. Crystals UA NOT REPORTED None /HPF    Epithelial Cells UA 2 TO 5 0 - 5 /HPF    Renal Epithelial, Urine NOT REPORTED 0 /HPF    Bacteria, UA NOT REPORTED None    Mucus, UA NOT REPORTED None    Trichomonas, UA NOT REPORTED None    Amorphous, UA NOT REPORTED None    Other Observations UA NOT REPORTED NOT REQ. Yeast, UA NOT REPORTED None   Urine culture clean catch    Collection Time: 04/30/19 11:03 AM   Result Value Ref Range    Specimen Description . URINE     Special Requests NOT REPORTED     Culture NO SIGNIFICANT GROWTH    C.trachomatis N.gonorrhoeae DNA    Collection Time: 04/30/19 11:03 AM   Result Value Ref Range    Specimen Description . CERVIX     C. trachomatis DNA NEGATIVE NEGATIVE    N. gonorrhoeae DNA NEGATIVE NEGATIVE   VAGINITIS DNA PROBE    Collection Time: 04/30/19 11:03 AM   Result Value Ref Range    Specimen Description . VAGINA     Special Requests NOT REPORTED     Direct Exam POSITIVE for Candida sp. (A)     Direct Exam NEGATIVE for Trichomonas vaginalis     Direct Exam NEGATIVE for Gardnerella vaginalis     Direct Exam       Method of testing is a DNA probe intended for detection and identification of Candida species, Gardnerella vaginalis, and Trichomonas vaginalis nucleic acid in vaginal fluid specimens from patients with symptoms of vaginitis/vaginosis.    US OB Follow Up Transabdominal Approach    Collection Time: 05/01/19 12:00 AM   Result Value Ref Range    Biparietal Diameter  cm    Abdominal Circumference  cm    Femoral Diameter  cm    Head Circumference  cm    HC/AC      Estimated Fetal Weight  grams   US OB Follow Up Transabdominal Approach    Collection Time: 05/01/19 12:00 AM   Result Value Ref Range    Biparietal Diameter  cm    Abdominal Circumference  cm    Femoral Diameter  cm    Head Circumference  cm    HC/AC      Estimated Fetal Weight  grams   Comprehensive Metabolic Panel    Collection Time: 05/23/19 10:40 AM   Result Value Ref Range    Glucose 74 70 - 99 mg/dL    BUN 5 (L) 6 - 20 mg/dL    CREATININE 0.68 0.50 - 0.90 mg/dL    Bun/Cre Ratio NOT REPORTED 9 - 20    Calcium 9.0 8.6 - 10.4 mg/dL    Sodium 136 135 - 144 mmol/L    Potassium 4.1 3.7 - 5.3 mmol/L    Chloride 101 98 - 107 mmol/L    CO2 18 (L) 20 - 31 mmol/L    Anion Gap 17 9 - 17 mmol/L    Alkaline Phosphatase 129 (H) 35 - 104 U/L    ALT 6 5 - 33 U/L    AST 12 <32 U/L    Total Bilirubin 0.54 0.3 - 1.2 mg/dL    Total Protein 6.9 6.4 - 8.3 g/dL    Alb 3.4 (L) 3.5 - 5.2 g/dL    Albumin/Globulin Ratio 1.0 1.0 - 2.5    GFR Non-African American >60 >60 mL/min    GFR African American >60 >60 mL/min    GFR Comment          GFR Staging NOT REPORTED    CBC Auto Differential    Collection Time: 05/23/19 10:40 AM   Result Value Ref Range    WBC 7.7 3.5 - 11.3 k/uL    RBC 3.88 (L) 3.95 - 5.11 m/uL    Hemoglobin 9.4 (L) 11.9 - 15.1 g/dL    Hematocrit 31.3 (L) 36.3 - 47.1 %    MCV 80.7 (L) 82.6 - 102.9 fL    MCH 24.2 (L) 25.2 - 33.5 pg    MCHC 30.0 28.4 - 34.8 g/dL    RDW 14.2 11.8 - 14.4 %    Platelets 650 060 - 122 k/uL    MPV 12.3 8.1 - 13.5 fL    NRBC Automated 0.0 0.0 per 100 WBC    Differential Type NOT REPORTED     Seg Neutrophils 62 36 - 65 %    Lymphocytes 30 24 - 43 %    Monocytes 6 3 - 12 %    Eosinophils % 1 1 - 4 %    Basophils 1 0 - 2 %    Immature Granulocytes 0 0 %    Segs Absolute 4.80 1.50 - 8.10 k/uL    Absolute Lymph # 2.26 1.10 - 3.70 k/uL    Absolute Mono # 0.44 0.10 - 1.20 k/uL    Absolute Eos # 0.07 0.00 - 0.44 k/uL    Basophils Absolute 0.05 0.00 - 0.20 k/uL    Absolute Immature Granulocyte 0.03 0.00 - 0.30 k/uL    WBC Morphology NOT REPORTED     RBC Morphology MICROCYTOSIS PRESENT     Platelet Estimate NOT Urinalysis Reflex to Culture    Collection Time: 05/24/19 12:57 PM   Result Value Ref Range    Color, UA DARK YELLOW (A) YELLOW    Turbidity UA CLEAR CLEAR    Glucose, Ur NEGATIVE NEGATIVE    Bilirubin Urine NEGATIVE  Verified by ictotest. (A) NEGATIVE    Ketones, Urine TRACE (A) NEGATIVE    Specific Gravity, UA 1.023 1.005 - 1.030    Urine Hgb NEGATIVE NEGATIVE    pH, UA 6.0 5.0 - 8.0    Protein, UA 2+ (A) NEGATIVE    Urobilinogen, Urine Normal Normal    Nitrite, Urine NEGATIVE NEGATIVE    Leukocyte Esterase, Urine TRACE (A) NEGATIVE    Urinalysis Comments Culture ordered based on defined criteria. DRUG SCREEN MULTI URINE    Collection Time: 05/24/19 12:57 PM   Result Value Ref Range    Amphetamine Screen, Ur NEGATIVE NEGATIVE    Barbiturate Screen, Ur NEGATIVE NEGATIVE    Benzodiazepine Screen, Urine NEGATIVE NEGATIVE    Cocaine Metabolite, Urine NEGATIVE NEGATIVE    Methadone Screen, Urine NEGATIVE NEGATIVE    Opiates, Urine NEGATIVE NEGATIVE    Phencyclidine, Urine NEGATIVE NEGATIVE    Propoxyphene, Urine NOT REPORTED NEGATIVE    Cannabinoid Scrn, Ur NEGATIVE NEGATIVE    Oxycodone Screen, Ur NEGATIVE NEGATIVE    Methamphetamine, Urine NOT REPORTED NEGATIVE    Tricyclic Antidepressants, Urine NOT REPORTED NEGATIVE    MDMA, Urine NOT REPORTED NEGATIVE    Buprenorphine Urine NOT REPORTED NEGATIVE    Test Information       Assay provides medical screening only. The absence of expected drug(s) and/or metabolite(s) may indicate diluted or adulterated urine, limitations of testing or timing of collection.    Microscopic Urinalysis    Collection Time: 05/24/19 12:57 PM   Result Value Ref Range    -          WBC, UA 5 TO 10 0 - 5 /HPF    RBC, UA 5 TO 10 0 - 2 /HPF    Casts UA 2 TO 5 0 - 2 /LPF    Casts UA COARSELY GRANULAR 0 - 2 /LPF    Casts UA 0 TO 2 0 - 2 /LPF    Casts UA WHITE BLOOD CELL 0 - 2 /LPF    Crystals UA NOT REPORTED None /HPF    Epithelial Cells UA 10 TO 20 0 - 5 /HPF    Renal Epithelial, Urine NOT IU/L   CBC WITH AUTO DIFFERENTIAL    Collection Time: 11/07/19  3:55 PM   Result Value Ref Range    WBC 8.0 3.5 - 11.3 k/uL    RBC 4.31 3.95 - 5.11 m/uL    Hemoglobin 12.1 11.9 - 15.1 g/dL    Hematocrit 37.5 36.3 - 47.1 %    MCV 87.0 82.6 - 102.9 fL    MCH 28.1 25.2 - 33.5 pg    MCHC 32.3 28.4 - 34.8 g/dL    RDW 15.8 (H) 11.8 - 14.4 %    Platelets 758 345 - 325 k/uL    MPV 11.1 8.1 - 13.5 fL    NRBC Automated 0.0 0.0 per 100 WBC    Differential Type NOT REPORTED     Seg Neutrophils 61 36 - 65 %    Lymphocytes 31 24 - 43 %    Monocytes 5 3 - 12 %    Eosinophils % 2 1 - 4 %    Basophils 1 0 - 2 %    Immature Granulocytes 0 0 %    Segs Absolute 4.94 1.50 - 8.10 k/uL    Absolute Lymph # 2.46 1.10 - 3.70 k/uL    Absolute Mono # 0.41 0.10 - 1.20 k/uL    Absolute Eos # 0.13 0.00 - 0.44 k/uL    Basophils Absolute 0.04 0.00 - 0.20 k/uL    Absolute Immature Granulocyte 0.03 0.00 - 0.30 k/uL    WBC Morphology NOT REPORTED     RBC Morphology ANISOCYTOSIS PRESENT     Platelet Estimate NOT REPORTED    Urinalysis with microscopic    Collection Time: 11/07/19  4:09 PM   Result Value Ref Range    Color, UA YELLOW YELLOW    Turbidity UA CLOUDY (A) CLEAR    Glucose, Ur NEGATIVE NEGATIVE    Bilirubin Urine NEGATIVE NEGATIVE    Ketones, Urine NEGATIVE NEGATIVE    Specific Gravity, UA 1.011 1.005 - 1.030    Urine Hgb NEGATIVE NEGATIVE    pH, UA 6.5 5.0 - 8.0    Protein, UA NEGATIVE NEGATIVE    Urobilinogen, Urine Normal Normal    Nitrite, Urine NEGATIVE NEGATIVE    Leukocyte Esterase, Urine NEGATIVE NEGATIVE    -          WBC, UA 2 TO 5 0 - 5 /HPF    RBC, UA 0 TO 2 0 - 4 /HPF    Casts UA  0 - 8 /LPF    Crystals UA NOT REPORTED None /HPF    Epithelial Cells UA 10 TO 20 0 - 5 /HPF    Renal Epithelial, Urine NOT REPORTED 0 /HPF    Bacteria, UA FEW (A) None    Mucus, UA NOT REPORTED None    Trichomonas, UA NOT REPORTED None    Amorphous, UA NOT REPORTED None    Other Observations UA NOT REPORTED NOT REQ.     Yeast, UA NOT REPORTED None Urine Culture    Collection Time: 11/07/19  4:09 PM   Result Value Ref Range    Specimen Description . CLEAN CATCH URINE     Special Requests NOT REPORTED     Culture NO SIGNIFICANT GROWTH        The patient is here to have the IUD strings rechecked. She has had no trouble with the IUD, and had sex without problems. All 3 girls are doing well. The 3year-old believe she is a mommy to the twins  The strings appear to be in normal position  Pap smear was also taken    IMP:   Encounter Diagnoses   Name Primary?     Encounter for gynecological examination without abnormal finding Yes    IUD check up          PLAN: RV yearly

## 2020-02-10 ENCOUNTER — PROCEDURE VISIT (OUTPATIENT)
Dept: OBGYN CLINIC | Age: 25
End: 2020-02-10
Payer: COMMERCIAL

## 2020-02-10 VITALS
DIASTOLIC BLOOD PRESSURE: 74 MMHG | BODY MASS INDEX: 46.98 KG/M2 | WEIGHT: 282 LBS | HEIGHT: 65 IN | SYSTOLIC BLOOD PRESSURE: 114 MMHG

## 2020-02-10 PROCEDURE — 57455 BIOPSY OF CERVIX W/SCOPE: CPT | Performed by: OBSTETRICS & GYNECOLOGY

## 2020-02-10 NOTE — PROGRESS NOTES
COLPOSCOPY PROCEDURE    INDICATIONS:  ASCUS     HPV:   positive      Abnormal Cytology and Colposcopy History: First abnormal pap    COLPOSCOPIC EXAMINATION:                Blood pressure 114/74, height 5' 5\" (1.651 m), weight 282 lb (127.9 kg), currently breastfeeding. Gross observations: IUD strings seen.   Only abnl area is at 5:00  Satisfactory: Yes     FINDINGS: Some punctation in white epithelium at 5:00     ECC performed:  No     Acetic acid applied:   Yes       IMPRESSIONS: Expect very low-grade change  Biopsy sites: 5:00

## 2020-02-13 ENCOUNTER — TELEPHONE (OUTPATIENT)
Dept: OBGYN CLINIC | Age: 25
End: 2020-02-13

## 2020-02-13 NOTE — TELEPHONE ENCOUNTER
20 y/o Pt LMOR for nurse voicemail for Colpo bx results. Called [facility] and not resulted yet. Called pt notified Bx not result yet. She can call tomorrow after 10:30 am and reports usually come form [facility] at that time. Pt verbalizes understanding.

## 2020-07-07 RX ORDER — VALACYCLOVIR HYDROCHLORIDE 500 MG/1
TABLET, FILM COATED ORAL
Qty: 30 TABLET | Refills: 1 | Status: SHIPPED | OUTPATIENT
Start: 2020-07-07 | End: 2020-08-26

## 2020-08-04 ENCOUNTER — OFFICE VISIT (OUTPATIENT)
Dept: OBGYN CLINIC | Age: 25
End: 2020-08-04
Payer: COMMERCIAL

## 2020-08-04 VITALS
BODY MASS INDEX: 46.65 KG/M2 | DIASTOLIC BLOOD PRESSURE: 80 MMHG | WEIGHT: 280 LBS | HEIGHT: 65 IN | SYSTOLIC BLOOD PRESSURE: 124 MMHG

## 2020-08-04 PROCEDURE — G8427 DOCREV CUR MEDS BY ELIG CLIN: HCPCS | Performed by: OBSTETRICS & GYNECOLOGY

## 2020-08-04 PROCEDURE — 99213 OFFICE O/P EST LOW 20 MIN: CPT | Performed by: OBSTETRICS & GYNECOLOGY

## 2020-08-04 PROCEDURE — 1036F TOBACCO NON-USER: CPT | Performed by: OBSTETRICS & GYNECOLOGY

## 2020-08-04 PROCEDURE — G8417 CALC BMI ABV UP PARAM F/U: HCPCS | Performed by: OBSTETRICS & GYNECOLOGY

## 2020-08-04 ASSESSMENT — ENCOUNTER SYMPTOMS
NAUSEA: 0
ABDOMINAL PAIN: 0
DIARRHEA: 0
CONSTIPATION: 0
COUGH: 0
WHEEZING: 0
VOMITING: 0

## 2020-08-04 NOTE — PROGRESS NOTES
[facility], [address] [place] [address]  DATE OF VISIT:  20    Angelica Viera    :  1995  CHIEF COMPLAINT:    Chief Complaint   Patient presents with    Abnormal Pap Smear     Prev Pap: 20 ASCUS/HPV Other + (colpo 2/10/20 DONATO I)        HPI :   Angelica Viera is a 21 y.o. female here for repeat pap. She had ASCUS, HPV + in January and had a colposcopy showing DONATO 1. She was told by Dr. Kate Streeter to have a repeat pap in 6 months. She has also noticed a lesion under her right breast which won't heal. She also has a non-healing lesion on her right cheek. Past Medical History:   Diagnosis Date    Abnormal Pap smear of cervix 2020    ASCUS HPV+    Anemia     Anxiety 2015    Asthma     inhaler prn    Asthma affecting pregnancy in second trimester 2019    Back pain 2011    Depression 2015    HSV infection 2018    Neck pain 2011      Past Surgical History:   Procedure Laterality Date     SECTION Bilateral 2019    TWINS     SECTION, LOW TRANSVERSE  2015    DILATION AND CURETTAGE OF UTERUS  2019    EAB    INTRAUTERINE DEVICE INSERTION  2019     Family History   Problem Relation Age of Onset    Diabetes Mother     Other Mother     Cancer Maternal Grandfather      Social History     Tobacco Use   Smoking Status Former Smoker    Types: Cigars   Smokeless Tobacco Never Used     Social History     Substance and Sexual Activity   Alcohol Use No     Current Outpatient Medications   Medication Sig Dispense Refill    valACYclovir (VALTREX) 500 MG tablet take 1 tablet by mouth once daily 30 tablet 1    amphetamine-dextroamphetamine (ADDERALL, 10MG,) 10 MG tablet Take 10 mg by mouth daily.  1 capsule in the am 1 pill in the afternoon      ibuprofen (ADVIL;MOTRIN) 800 MG tablet Take 1 tablet by mouth every 8 hours as needed for Pain 30 tablet 0    EPINEPHrine (EPIPEN) 0.3 MG/0.3ML SOAJ injection Inject 0.3 mg into the muscle      albuterol sulfate HFA (PROVENTIL HFA) 108 (90 Base) MCG/ACT inhaler Inhale 2 puffs into the lungs every 4 hours as needed for Wheezing or Shortness of Breath (Space out to every 6 hours as symptoms improve) Space out to every 6 hours as symptoms improve. 1 Inhaler 0     Current Facility-Administered Medications   Medication Dose Route Frequency Provider Last Rate Last Dose    levonorgestrel (MIRENA) IUD 52 mg 1 each  1 each Intrauterine Continuous Marni Youssef MD   1 each at 19 [id number]       Allergies: Ancef [cefazolin]    Gynecologic History:  No LMP recorded. Patient has had an implant. Sexually Active: Yes  STD History: No      OB History    Para Term  AB Living   3 2 1 1 1 3   SAB TAB Ectopic Molar Multiple Live Births   0 0 0 0 1 3       Review of Systems   Constitutional: Negative for chills and fever. HENT: Negative for hearing loss. Respiratory: Negative for cough and wheezing. Cardiovascular: Negative for chest pain and palpitations. Gastrointestinal: Negative for abdominal pain, constipation, diarrhea, nausea and vomiting. Genitourinary: Negative for dysuria, frequency and urgency. Musculoskeletal: Negative for myalgias. Skin: Negative for rash. Neurological: Negative for dizziness, weakness and headaches. Hematological: Does not bruise/bleed easily. Psychiatric/Behavioral: Negative for suicidal ideas. /80 (Position: Sitting, Cuff Size: Large Adult)   Ht 5' 5\" (1.651 m)   Wt 280 lb (127 kg)   BMI 46.59 kg/m²     Physical Exam  Constitutional:       Appearance: She is well-developed. HENT:      Head: Normocephalic. Neck:      Musculoskeletal: Normal range of motion. Thyroid: No thyromegaly. Cardiovascular:      Rate and Rhythm: Normal rate and regular rhythm. Pulmonary:      Effort: Pulmonary effort is normal. No respiratory distress.    Chest:          Comments: Small 0.5 cm darkened area, appears as a scar  Abdominal: General: There is no distension. Palpations: Abdomen is soft. Tenderness: There is no abdominal tenderness. Musculoskeletal: Normal range of motion. Skin:     General: Skin is warm and dry. Comments: 0.5 cm darkened area on right check with central ulceration, non raised   Neurological:      Mental Status: She is alert and oriented to person, place, and time. Psychiatric:         Behavior: Behavior normal.         Thought Content: Thought content normal.         Judgment: Judgment normal.         ASSESSMENT:  Herbert Mcarthur is a 21 y.o. female      ICD-10-CM    1. Atypical squamous cells of undetermined significance on cytologic smear of cervix (ASC-US)  R87.610    2. 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 Skin lesion  L98.9        PLAN:    Discussed ASCCP recommendation to repeat pap in 1 year after DONATO 1 at colposcopy, so will plan to repeat in January. Referral to dermatology for skin lesions.      Electronically signed by Len Gayle MD on 8/4/2020 at 11:44 AM

## 2020-08-26 RX ORDER — VALACYCLOVIR HYDROCHLORIDE 500 MG/1
TABLET, FILM COATED ORAL
Qty: 30 TABLET | Refills: 1 | Status: SHIPPED | OUTPATIENT
Start: 2020-08-26 | End: 2020-11-04

## 2020-11-04 RX ORDER — VALACYCLOVIR HYDROCHLORIDE 500 MG/1
TABLET, FILM COATED ORAL
Qty: 30 TABLET | Refills: 1 | Status: SHIPPED | OUTPATIENT
Start: 2020-11-04 | End: 2020-12-31

## 2020-11-18 ENCOUNTER — OFFICE VISIT (OUTPATIENT)
Dept: DERMATOLOGY | Age: 25
End: 2020-11-18
Payer: MEDICARE

## 2020-11-18 VITALS
DIASTOLIC BLOOD PRESSURE: 89 MMHG | BODY MASS INDEX: 46.98 KG/M2 | TEMPERATURE: 97 F | HEIGHT: 65 IN | HEART RATE: 86 BPM | SYSTOLIC BLOOD PRESSURE: 139 MMHG | WEIGHT: 282 LBS | OXYGEN SATURATION: 99 %

## 2020-11-18 PROCEDURE — 99202 OFFICE O/P NEW SF 15 MIN: CPT | Performed by: DERMATOLOGY

## 2020-11-18 PROCEDURE — G8484 FLU IMMUNIZE NO ADMIN: HCPCS | Performed by: DERMATOLOGY

## 2020-11-18 PROCEDURE — 1036F TOBACCO NON-USER: CPT | Performed by: DERMATOLOGY

## 2020-11-18 PROCEDURE — G8417 CALC BMI ABV UP PARAM F/U: HCPCS | Performed by: DERMATOLOGY

## 2020-11-18 PROCEDURE — G8427 DOCREV CUR MEDS BY ELIG CLIN: HCPCS | Performed by: DERMATOLOGY

## 2020-11-18 RX ORDER — LISDEXAMFETAMINE DIMESYLATE 30 MG/1
CAPSULE ORAL
COMMUNITY
Start: 2020-10-27

## 2020-11-18 NOTE — PROGRESS NOTES
Dermatology Patient Note  [id number]  [address]  Dept: [phone]  Dept Fax: [phone]: 11/18/2020   REFERRING PROVIDER: Lindsay Castaneda MD      Prabhjot Luu is a 31 y.o. female  who presents today in the office for:    New Patient (lesion on the rt temple for 2 years, its painful. It started a pimple and she eventually was able to squeeze \"stuff out of it\". She has a lesion under the Rt breast that has been there since the beginning of this year and its painful. Its worse when she wears a bra.  )      HISTORY OF PRESENT ILLNESS:  31 y.o. female presenting for lesion  Location: right temple  Duration: 2 years  Symptoms: painful and itchy  Course: was able to squeeze material out of it, but hole is still there and it continues to fill up  Exacerbating factors: unsure  Prior treatments: none  Also complains of painful bump under right abdomen      CURRENT MEDICATIONS:   Current Outpatient Medications   Medication Sig Dispense Refill    VYVANSE 30 MG capsule TAKE 1 CAPSULE BY MOUTH EVERY DAY IN THE MORNING      valACYclovir (VALTREX) 500 MG tablet take 1 tablet by mouth once daily 30 tablet 1    amphetamine-dextroamphetamine (ADDERALL, 10MG,) 10 MG tablet Take 10 mg by mouth daily. 1 capsule in the am 1 pill in the afternoon      ibuprofen (ADVIL;MOTRIN) 800 MG tablet Take 1 tablet by mouth every 8 hours as needed for Pain 30 tablet 0    EPINEPHrine (EPIPEN) 0.3 MG/0.3ML SOAJ injection Inject 0.3 mg into the muscle      albuterol sulfate HFA (PROVENTIL HFA) 108 (90 Base) MCG/ACT inhaler Inhale 2 puffs into the lungs every 4 hours as needed for Wheezing or Shortness of Breath (Space out to every 6 hours as symptoms improve) Space out to every 6 hours as symptoms improve.  1 Inhaler 0     Current Facility-Administered Medications   Medication Dose Route Frequency Provider Last Rate Last Dose    levonorgestrel (MIRENA) IUD 52 mg 1 each  1 each Intrauterine Continuous Cheko Crespo MD   1 each at 11/14/19 0595       ALLERGIES:   Allergies   Allergen Reactions    Ancef [Cefazolin] Shortness Of Breath       SOCIAL HISTORY:  Social History     Tobacco Use    Smoking status: Former Smoker     Types: Cigars    Smokeless tobacco: Never Used   Substance Use Topics    Alcohol use: No       REVIEW OF SYSTEMS:  Review of Systems  Skin: Denies any new changing, growing orbleeding lesions or rashes except as described in the HPI   Constitutional: Denies fevers, chills, and malaise. PHYSICAL EXAM:   /89   Pulse 86   Temp 97 °F (36.1 °C) (Temporal)   Ht 5' 5\" (1.651 m)   Wt 282 lb (127.9 kg)   SpO2 99%   BMI 46.93 kg/m²     General Exam:  General Appearance: No acute distress, Well nourished     Neuro: Alert and oriented to person, place and time  Psych: Normal affect   Lymph Node: Not performed    Cutaneous Exam: Performed as documented in clinic note below. Sun-exposed skin, which includes the head/face, neck, both arms, digits and/or nails was examined. + focal abdomen    Pertinent Physical Exam Findings:  Physical Exam  Right temple with small cyst with dilated punctum  Right upper abdomen with dimple    Photo surveillance performed: No    Medical Necessity of Exam Performed:   Distribution of patient concerns    Additional Diagnostic Testing performed during exam: Not performed ,  Not performed    ASSESSMENT:   Diagnosis Orders   1. Inflamed epidermoid cyst of skin     2. Neoplasm of uncertain behavior of skin         Plan of Action is as Follows:  Assessment   1.  Inflamed epidermoid cyst of skin/dilated pore, right temple  - symptomatic  - schedule punch excision    2. DF vs. Lipoatrophy vs. Small cyst, right abdomen  - reassurance, is not concerning for malignancy  - patient reports symptoms, may consider punch biopsy/removal    RTC for procedure            There are no Patient Instructions on file for this visit. Follow-up: No follow-ups on file. This note was created with the assistance of a speech-recognition program.  Although the intention is to generate a document that actually reflects the content of the visit, no guarantees can be provided that every mistake has been identified and corrected byediting.     Electronically signed by Lloyd Coker MD on 11/18/20 at 1:16 PM EST

## 2020-12-31 RX ORDER — VALACYCLOVIR HYDROCHLORIDE 500 MG/1
TABLET, FILM COATED ORAL
Qty: 30 TABLET | Refills: 1 | Status: SHIPPED | OUTPATIENT
Start: 2020-12-31 | End: 2021-02-25

## 2021-01-20 ENCOUNTER — TELEPHONE (OUTPATIENT)
Dept: DERMATOLOGY | Age: 26
End: 2021-01-20

## 2021-01-20 NOTE — TELEPHONE ENCOUNTER
That is fine if she prefers not to pursue surgery.  If she has specific concerns I am happy to address

## 2021-01-20 NOTE — TELEPHONE ENCOUNTER
Plan of Action is as Follows:  Assessment   1. Inflamed epidermoid cyst of skin/dilated pore, right temple  - symptomatic  - schedule punch excision     2. DF vs. Lipoatrophy vs. Small cyst, right abdomen  - reassurance, is not concerning for malignancy  - patient reports symptoms, may consider punch biopsy/removal       Pt called to cancel this, she states that she is scared and that you said it was up to her if she wanted it or not.

## 2021-02-25 RX ORDER — VALACYCLOVIR HYDROCHLORIDE 500 MG/1
TABLET, FILM COATED ORAL
Qty: 30 TABLET | Refills: 1 | Status: SHIPPED | OUTPATIENT
Start: 2021-02-25 | End: 2021-05-18

## 2021-05-03 ENCOUNTER — OFFICE VISIT (OUTPATIENT)
Dept: OBGYN CLINIC | Age: 26
End: 2021-05-03
Payer: MEDICARE

## 2021-05-03 ENCOUNTER — HOSPITAL ENCOUNTER (OUTPATIENT)
Age: 26
Setting detail: SPECIMEN
Discharge: HOME | End: 2021-05-03
Payer: MEDICARE

## 2021-05-03 VITALS
DIASTOLIC BLOOD PRESSURE: 62 MMHG | SYSTOLIC BLOOD PRESSURE: 132 MMHG | BODY MASS INDEX: 46.98 KG/M2 | WEIGHT: 282 LBS | HEIGHT: 65 IN

## 2021-05-03 DIAGNOSIS — Z01.419 ENCOUNTER FOR GYNECOLOGICAL EXAMINATION: Primary | ICD-10-CM

## 2021-05-03 PROBLEM — O23.41 URINARY TRACT INFECTION IN MOTHER DURING FIRST TRIMESTER OF PREGNANCY: Status: RESOLVED | Noted: 2018-12-13 | Resolved: 2021-05-03

## 2021-05-03 PROBLEM — B00.9 HSV-2 (HERPES SIMPLEX VIRUS 2) INFECTION: Status: RESOLVED | Noted: 2018-11-19 | Resolved: 2021-05-03

## 2021-05-03 PROCEDURE — 99395 PREV VISIT EST AGE 18-39: CPT | Performed by: STUDENT IN AN ORGANIZED HEALTH CARE EDUCATION/TRAINING PROGRAM

## 2021-05-03 NOTE — PROGRESS NOTES
[facility] [place] [facility]   [place] [address] [address] [facility], [address]      Danial Abreu  5/3/2021                       Primary Care Physician: Jay Flores    CC:   Chief Complaint   Patient presents with    Annual Exam     Prev Pap: 1/8/20 ASCUS/HPV Other + (colpo 2/10/20 DONATO I)         HPI: Danial Abreu is a 31 y.o. female [id number] No LMP recorded. Patient has had an implant. The patient was seen and examined. She is here for an annual visit. She is complaining of nothing. Patient has an IUD in place and has very minimal bleeding. Her bowel habits are regular. She denies any bloating. She denies dysuria. She denies urinary leaking. She denies vaginal discharge. She is sexually active. She uses IUD for contraception and is not desiring pregnancy.     Depression Screen: Symptoms of decreased mood absent  Symptoms of anhedonia absent  **If either question is answered in a  positive fashion then complete the PHQ9 Scoring Evaluation and make the appropriate referral**    REVIEW OF SYSTEMS:   Constitutional: negative fever, negative chills  HEENT: negative visual disturbances, negative headaches  Respiratory: negative dyspnea, negative cough  Cardiovascular: negative chest pain,  negative palpitations  Gastrointestinal: negative abdominal pain, negative RUQ pain, negative N/V, negative diarrhea, negative constipation  Genitourinary: negative dysuria, negative vaginal discharge  Dermatological: negative rash  Hematologic: negative bruising  Immunologic/Lymphatic: negative recent illness, negative recent sick contact  Musculoskeletal: negative back pain, negative myalgias, negative arthralgias  Neurological:  negative dizziness, negative weakness  Behavior/Psych: negative depression, negative anxiety      GYNECOLOGICAL HISTORY:  Age of Menarche: 14  Age of Menopause: N/A     STD History: no past history    Pap History: Last PAP was abnormal;  January/2020 - ASC-US; Atypical Squamous Cells of Undetermined Significance. With + Other HRHPV  Colposcopy History: admits to    Permanent Sterilization: no  Reversible Birth Control: yes - Mirena IUD  Hormone Replacement Exposure: no    OBSTETRICAL HISTORY:  OB History    Para Term  AB Living   3 2 1 1 1 3   SAB TAB Ectopic Molar Multiple Live Births   0 0 0 0 1 3      # Outcome Date GA Lbr Luis Felipe/2nd Weight Sex Delivery Anes PTL Lv   3 AB 19 5w3d             Birth Comments: EAB   2A  19 36w0d  4 lb 9 oz (2.07 kg) F CS-LTranv Spinal Y HALINA      Complications: IUGR (intrauterine growth restriction) affecting care of mother      Name: Vivian Fan: [address] [facility]: 9   2B  19 36w0d  6 lb (2.722 kg) F CS-LTranv  Y HALINA      Name: Tung Mor: 7  Apgar5: 9   1 Term 04/09/15 38w0d  6 lb 15 oz (3.147 kg) F CS-LTranv   HALINA      Complications: Fetal Intolerance, Oligohydramnios       PAST MEDICAL HISTORY:   has a past medical history of Abnormal Pap smear of cervix, Anemia, Anxiety, Asthma, Asthma affecting pregnancy in second trimester, Back pain, Depression, HSV infection, and Neck pain. PAST SURGICAL HISTORY:   has a past surgical history that includes  section, low transverse (2015);  section (Bilateral, 2019); Dilation and curettage of uterus (2019); and intrauterine device insertion (2019). ALLERGIES:  is allergic to ancef [cefazolin]. MEDICATIONS:  Prior to Admission medications    Medication Sig Start Date End Date Taking? Authorizing Provider   valACYclovir (VALTREX) 500 MG tablet take 1 tablet by mouth once daily 21  Yes NITHIN Madsen - CNP   VYVANSE 30 MG capsule TAKE 1 CAPSULE BY MOUTH EVERY DAY IN THE MORNING 10/27/20  Yes Historical Provider, MD   amphetamine-dextroamphetamine (ADDERALL, 10MG,) 10 MG tablet Take 10 mg by mouth daily.  1 capsule in the am 1 pill in the afternoon   Yes Historical Provider, MD   ibuprofen (ADVIL;MOTRIN) 800 MG tablet Take 1 tablet by mouth every 8 hours as needed for Pain 5/27/19  Yes Radha Madrigal DO   EPINEPHrine [facility]) 0.3 MG/0.3ML SOAJ injection Inject 0.3 mg into the muscle 4/28/18  Yes Historical Provider, MD   albuterol sulfate HFA (PROVENTIL HFA) 108 (90 Base) MCG/ACT inhaler Inhale 2 puffs into the lungs every 4 hours as needed for Wheezing or Shortness of Breath (Space out to every 6 hours as symptoms improve) Space out to every 6 hours as symptoms improve. 4/23/18  Yes Yosef Tafoya MD       FAMILY HISTORY:  Family History of Breast, Ovarian, Colon or Uterine Cancer: No   family history includes Cancer in her maternal grandfather; Diabetes in her mother; Other in her mother. SOCIAL HISTORY:   reports that she has quit smoking. Her smoking use included cigars. She has never used smokeless tobacco. She reports that she does not drink alcohol or use drugs. HEALTH MAINTENANCE:  Immunization status: up to date and documented  Garidisil immunization: already received but no records available. ROUTINE GYN HEALTH MAINTENANCE  Mammogram: N/A  Colonoscopy: N/A  Pap Smears: ASCUS with + Other HRHPV 1/2020. Colpo CIN1. Due today. Family Planning: IUD  DEXA: N/A    VITALS:  Vitals:    05/03/21 0925   BP: 132/62   Position: Sitting   Cuff Size: Large Adult   Weight: 282 lb (127.9 kg)   Height: 5' 5\" (1.651 m)                                                                                                                                                                                                        PHYSICAL EXAM:   General Appearance: Appears healthy. Alert; in no acute distress. Pleasant. Skin: Skin color, texture, turgor normal. No rashes or lesions. HEENT: normocephalic and atraumatic, Thyroid normal to inspection and palpation  Respiratory: Normal expansion. Clear to auscultation. No rales, rhonchi, or wheezing.   Cardiovascular: normal rate, normal S1 and S2, no gallops, intact distal pulses and no carotid bruits Breast:  (Chest): Declined  Abdomen: soft, non-tender, non-distended, no right upper quadrant tenderness and no CVA tenderness  Pelvic Exam:   External genitalia: General appearance; normal, Hair distribution; normal, Lesions absent  Urinary system: urethral meatus normal  Vaginal: normal mucosa, no discharge  Cervix: normal appearing cervix without discharge or lesions, IUD strings visualized  Adnexa: non-palpable  Uterus: normal single, nontender  Rectal Exam: exam declined by patient  Musculoskeletal: no gross abnormalities  Extremities: non-tender BLE and non-edematous  Psych:  oriented to time, place and person     DATA:  No results found for this visit on 21. ASSESSMENT & PLAN:    Robert Lawler is a 31 y.o. female [id number] No LMP recorded. Patient has had an implant.   - She previously received the Garidisil immunization    Annual:    Mammogram: N/A   Colonoscopy: N/A   Pap Smears: ASCUS with + Other HRHPV 2020. Colpo CIN1. Due today. Family Planning: IUD   DEXA: N/A    Patient Active Problem List    Diagnosis Date Noted    Anxiety 2015     Priority: Medium    Former tobacco use 2015     Priority: Low     Quit early in pregnancy      RLTCS 19 F APG 8/9 Wt 4#9, F 8/9 Wt 5#15 2019    Rh+/RI/GBS pend 05/10/2019    Dichorionic diamniotic twin pregnancy in third trimester 2019    Pregnancy affected by fetal growth restriction 2019     Growth USN with [facility] 4/3/19 showed EFW for baby A 1099g (36%ile) but with a lagging AC in the <3%ile. Growth appropriate for baby B at 1353 g( 57%ile). Scheduled at [facility] for growth USN q 3 weeks and will start  testing. [facility] recommendations for delivery at 38/0-38/6 based on di-di twins, but we may need to move this up with the finding of growth restriction (ACOG recommends 36/0-37/6) for di-di twins with isolated growth restriction.       Obesity affecting pregnancy in third trimester 2019     ASA 81 mg      Asthma affecting pregnancy in third trimester 01/14/2019    Hx C/S x1 (G1) 04/10/2015       Return in about 1 year (around 5/3/2022) for Annual.  No Patient Care Coordination Note on file. Counseling Completed:    Discussed need for repeat pap as per American Society for Colposcopy and Cervical Pathology guidelines. Discussed need for mammograms every 1 year, If >42 yo and last mammogram was negative. Discussed Calcium and Vitamin D dosing. Discussed need for colonoscopy screening as well as onset for bone density testing. Discussed birth control and barrier recommendations. Discussed STD counseling and prevention. Discussed Gardisil counseling for all patients 10-37 yo. Hereditary Breast, Ovarian, Colon and Uterine Cancer screening discussed. Tobacco & Secondary smoke risks discussed; with recommendation for cessation and avoidance. Routine health maintenance per patients PCP discussed. Diagnosis Orders   1.  Encounter for gynecological examination  PAP SMEAR        Wendy Frankr, Alona Wilkins [address] OB/GYN, [address]  5/3/2021, 9:45 AM

## 2021-05-05 LAB
CYTOLOGY REPORT: NORMAL
HPV SAMPLE: NORMAL
HPV, GENOTYPE 16: NOT DETECTED
HPV, GENOTYPE 18: NOT DETECTED
HPV, HIGH RISK OTHER: NOT DETECTED
HPV, INTERPRETATION: NORMAL
SPECIMEN DESCRIPTION: NORMAL

## 2021-05-18 RX ORDER — VALACYCLOVIR HYDROCHLORIDE 500 MG/1
TABLET, FILM COATED ORAL
Qty: 30 TABLET | Refills: 1 | Status: SHIPPED | OUTPATIENT
Start: 2021-05-18 | End: 2021-07-19

## 2021-07-19 RX ORDER — VALACYCLOVIR HYDROCHLORIDE 500 MG/1
TABLET, FILM COATED ORAL
Qty: 30 TABLET | Refills: 1 | Status: SHIPPED | OUTPATIENT
Start: 2021-07-19 | End: 2021-09-20

## 2021-09-20 RX ORDER — VALACYCLOVIR HYDROCHLORIDE 500 MG/1
TABLET, FILM COATED ORAL
Qty: 30 TABLET | Refills: 1 | Status: SHIPPED | OUTPATIENT
Start: 2021-09-20 | End: 2021-11-19

## 2021-11-19 RX ORDER — VALACYCLOVIR HYDROCHLORIDE 500 MG/1
TABLET, FILM COATED ORAL
Qty: 30 TABLET | Refills: 1 | Status: SHIPPED | OUTPATIENT
Start: 2021-11-19 | End: 2022-01-19

## 2022-01-19 RX ORDER — VALACYCLOVIR HYDROCHLORIDE 500 MG/1
TABLET, FILM COATED ORAL
Qty: 30 TABLET | Refills: 1 | Status: SHIPPED | OUTPATIENT
Start: 2022-01-19 | End: 2022-03-21

## 2022-02-15 ENCOUNTER — HOSPITAL ENCOUNTER (OUTPATIENT)
Age: 27
Setting detail: SPECIMEN
Discharge: HOME | End: 2022-02-15

## 2022-02-15 LAB — HBV SURFACE AB TITR SER: 4.77 MIU/ML

## 2022-03-21 RX ORDER — VALACYCLOVIR HYDROCHLORIDE 500 MG/1
TABLET, FILM COATED ORAL
Qty: 30 TABLET | Refills: 1 | Status: SHIPPED | OUTPATIENT
Start: 2022-03-21 | End: 2022-05-22

## 2022-05-22 RX ORDER — VALACYCLOVIR HYDROCHLORIDE 500 MG/1
TABLET, FILM COATED ORAL
Qty: 30 TABLET | Refills: 1 | Status: SHIPPED | OUTPATIENT
Start: 2022-05-22 | End: 2022-07-19 | Stop reason: SDUPTHER

## 2022-07-19 RX ORDER — VALACYCLOVIR HYDROCHLORIDE 500 MG/1
TABLET, FILM COATED ORAL
Qty: 90 TABLET | Refills: 1 | Status: SHIPPED | OUTPATIENT
Start: 2022-07-19

## 2022-10-05 ENCOUNTER — HOSPITAL ENCOUNTER (OUTPATIENT)
Age: 27
Setting detail: SPECIMEN
Discharge: HOME | End: 2022-10-05

## 2022-10-05 LAB — HBV SURFACE AB TITR SER: >1000 MIU/ML

## 2023-01-09 ENCOUNTER — HOSPITAL ENCOUNTER (OUTPATIENT)
Age: 28
Setting detail: SPECIMEN
Discharge: HOME | End: 2023-01-09

## 2023-01-11 RX ORDER — VALACYCLOVIR HYDROCHLORIDE 500 MG/1
TABLET, FILM COATED ORAL
Qty: 90 TABLET | Refills: 1 | Status: SHIPPED | OUTPATIENT
Start: 2023-01-11

## 2023-01-12 LAB
QUANTI TB GOLD PLUS: NEGATIVE
QUANTI TB1 MINUS NIL: 0.01 IU/ML (ref 0–0.34)
QUANTI TB2 MINUS NIL: 0.01 IU/ML (ref 0–0.34)
QUANTIFERON MITOGEN: >10 IU/ML
QUANTIFERON NIL: 0.02 IU/ML

## 2023-06-19 ENCOUNTER — OFFICE VISIT (OUTPATIENT)
Dept: OBGYN CLINIC | Age: 28
End: 2023-06-19
Payer: MEDICAID

## 2023-06-19 VITALS
DIASTOLIC BLOOD PRESSURE: 80 MMHG | HEART RATE: 73 BPM | WEIGHT: 293 LBS | BODY MASS INDEX: 49.26 KG/M2 | SYSTOLIC BLOOD PRESSURE: 121 MMHG

## 2023-06-19 DIAGNOSIS — T83.9XXA COMPLICATION OF INTRAUTERINE DEVICE (IUD), UNSPECIFIED COMPLICATION, INITIAL ENCOUNTER (HCC): Primary | ICD-10-CM

## 2023-06-19 PROCEDURE — 99213 OFFICE O/P EST LOW 20 MIN: CPT | Performed by: STUDENT IN AN ORGANIZED HEALTH CARE EDUCATION/TRAINING PROGRAM

## 2023-06-19 PROCEDURE — 58300 INSERT INTRAUTERINE DEVICE: CPT | Performed by: STUDENT IN AN ORGANIZED HEALTH CARE EDUCATION/TRAINING PROGRAM

## 2023-06-19 PROCEDURE — 58301 REMOVE INTRAUTERINE DEVICE: CPT | Performed by: STUDENT IN AN ORGANIZED HEALTH CARE EDUCATION/TRAINING PROGRAM

## 2023-06-19 NOTE — PROGRESS NOTES
barrier protection for sexually transmitted disease prevention. Procedure Details: The patient was counseled on the procedure. Risks, benefits and alternatives were reviewed. The patient is aware that this is diagnostic and not curative and a second procedure may be needed. A consent was reviewed and obtained. The Mirena IUD was opened and loaded into the delivery system. An Allis clamp was placed for stabilization. The wand was inserted just past the internal portio and the button was retracted to the first line. The wand was held in place for 10 seconds and then the button was retracted to its final position while the IUD was moved to the fundus, measuring 9 cm. The string was trimmed in standard fashion and the Allis clamp was removed. The speculum was then removed. The patient tolerated the procedure without difficulty. DATA:  No results found for this visit on 23. ASSESSMENT & PLAN:    Martin Bazan is a 28 y.o. female [id number] No LMP recorded. Patient has had an implant. Malpositioned IUD   - IUD removed and re-inserted today    Patient Active Problem List    Diagnosis Date Noted    Anxiety 2015     Priority: Medium    Former tobacco use 2015     Priority: Low     Quit early in pregnancy        RLTCS 19 F APG 8/9 Wt 4#9, F 8/9 Wt 5#15 2019    Rh+/RI/GBS pend 05/10/2019    Dichorionic diamniotic twin pregnancy in third trimester 2019    Pregnancy affected by fetal growth restriction 2019     Growth USN with [facility] 4/3/19 showed EFW for baby A 1099g (36%ile) but with a lagging AC in the <3%ile. Growth appropriate for baby B at 1353 g( 57%ile). Scheduled at [facility] for growth USN q 3 weeks and will start  testing. [facility] recommendations for delivery at 38/0-38/6 based on di-di twins, but we may need to move this up with the finding of growth restriction (ACOG recommends 36/0-37/6) for di-di twins with isolated growth restriction.         Obesity

## 2023-07-07 RX ORDER — VALACYCLOVIR HYDROCHLORIDE 500 MG/1
TABLET, FILM COATED ORAL
Qty: 90 TABLET | Refills: 1 | Status: SHIPPED | OUTPATIENT
Start: 2023-07-07

## 2023-08-07 PROBLEM — O36.5990 PREGNANCY AFFECTED BY FETAL GROWTH RESTRICTION: Status: RESOLVED | Noted: 2019-04-04 | Resolved: 2023-08-07

## 2023-08-07 PROBLEM — O09.90 HRP (HIGH RISK PREGNANCY), UNSPECIFIED TRIMESTER: Status: RESOLVED | Noted: 2019-05-10 | Resolved: 2023-08-07

## 2023-08-07 PROBLEM — O99.213 OBESITY AFFECTING PREGNANCY IN THIRD TRIMESTER: Status: RESOLVED | Noted: 2019-01-14 | Resolved: 2023-08-07

## 2023-08-07 PROBLEM — O99.513 ASTHMA AFFECTING PREGNANCY IN THIRD TRIMESTER: Status: RESOLVED | Noted: 2019-01-14 | Resolved: 2023-08-07

## 2023-08-07 PROBLEM — O30.043 DICHORIONIC DIAMNIOTIC TWIN PREGNANCY IN THIRD TRIMESTER: Status: RESOLVED | Noted: 2019-04-30 | Resolved: 2023-08-07

## 2023-08-07 PROBLEM — J45.909 ASTHMA AFFECTING PREGNANCY IN THIRD TRIMESTER: Status: RESOLVED | Noted: 2019-01-14 | Resolved: 2023-08-07

## 2023-12-26 RX ORDER — VALACYCLOVIR HYDROCHLORIDE 500 MG/1
TABLET, FILM COATED ORAL
Qty: 90 TABLET | Refills: 1 | Status: SHIPPED | OUTPATIENT
Start: 2023-12-26

## 2023-12-29 ENCOUNTER — HOSPITAL ENCOUNTER (OUTPATIENT)
Age: 28
Discharge: HOME | End: 2023-12-29

## 2024-03-20 ENCOUNTER — NURSE TRIAGE (OUTPATIENT)
Dept: OTHER | Facility: CLINIC | Age: 29
End: 2024-03-20
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
## 2024-06-20 RX ORDER — VALACYCLOVIR HYDROCHLORIDE 500 MG/1
TABLET, FILM COATED ORAL
Qty: 90 TABLET | Refills: 1 | OUTPATIENT
Start: 2024-06-20

## 2025-02-10 ENCOUNTER — HOSPITAL ENCOUNTER (EMERGENCY)
Age: 30
Discharge: HOME | End: 2025-02-10
Attending: EMERGENCY MEDICINE
Payer: COMMERCIAL

## 2025-02-10 VITALS
HEART RATE: 102 BPM | RESPIRATION RATE: 20 BRPM | WEIGHT: 293 LBS | BODY MASS INDEX: 51.59 KG/M2 | TEMPERATURE: 98.2 F | SYSTOLIC BLOOD PRESSURE: 127 MMHG | DIASTOLIC BLOOD PRESSURE: 76 MMHG | OXYGEN SATURATION: 97 %

## 2025-02-10 DIAGNOSIS — J10.1 INFLUENZA A: Primary | ICD-10-CM

## 2025-02-10 LAB
FLUAV RNA RESP QL NAA+PROBE: DETECTED
FLUBV RNA RESP QL NAA+PROBE: NOT DETECTED
SARS-COV-2 RNA RESP QL NAA+PROBE: NOT DETECTED
SOURCE: ABNORMAL
SPECIMEN DESCRIPTION: ABNORMAL

## 2025-02-10 PROCEDURE — 6370000000 HC RX 637 (ALT 250 FOR IP): Performed by: EMERGENCY MEDICINE

## 2025-02-10 PROCEDURE — 99283 EMERGENCY DEPT VISIT LOW MDM: CPT

## 2025-02-10 PROCEDURE — 87636 SARSCOV2 & INF A&B AMP PRB: CPT

## 2025-02-10 RX ORDER — OSELTAMIVIR PHOSPHATE 75 MG/1
75 CAPSULE ORAL 2 TIMES DAILY
Qty: 10 CAPSULE | Refills: 0 | Status: SHIPPED | OUTPATIENT
Start: 2025-02-10 | End: 2025-02-15

## 2025-02-10 RX ORDER — ACETAMINOPHEN 500 MG
1000 TABLET ORAL ONCE
Status: COMPLETED | OUTPATIENT
Start: 2025-02-10 | End: 2025-02-10

## 2025-02-10 RX ADMIN — ACETAMINOPHEN 1000 MG: 500 TABLET ORAL at 13:26

## 2025-02-10 ASSESSMENT — PAIN SCALES - GENERAL: PAINLEVEL_OUTOF10: 0

## 2025-02-10 ASSESSMENT — PAIN - FUNCTIONAL ASSESSMENT: PAIN_FUNCTIONAL_ASSESSMENT: NONE - DENIES PAIN

## 2025-02-10 NOTE — DISCHARGE INSTRUCTIONS
Return to this emergency room immediately if your symptoms persist, worsen or if new ones form.    Make sure you follow-up with your primary care doctor and your OB within the next 1-2 business days.

## 2025-02-10 NOTE — ED PROVIDER NOTES
lb)      MEDICATIONS GIVEN TO PATIENT THIS ENCOUNTER:  Orders Placed This Encounter   Medications    acetaminophen (TYLENOL) tablet 1,000 mg    oseltamivir (TAMIFLU) 75 MG capsule     Sig: Take 1 capsule by mouth 2 times daily for 5 days     Dispense:  10 capsule     Refill:  0     DISCHARGE PRESCRIPTIONS:  New Prescriptions    OSELTAMIVIR (TAMIFLU) 75 MG CAPSULE    Take 1 capsule by mouth 2 times daily for 5 days     PHYSICIAN CONSULTS ORDERED THIS ENCOUNTER:  None  FINAL IMPRESSION      1. Influenza A          DISPOSITION/PLAN   DISPOSITION Decision To Discharge 02/10/2025 02:32:25 PM   DISPOSITION CONDITION Stable           OUTPATIENT FOLLOW UP THE PATIENT:  HostShakira MD  [address] [place] [address]  [phone]    Schedule an appointment as soon as possible for a visit in 2 days  ED follow-up      MD Ella De Souza Joseph R, MD  02/10/25 [id number]

## 2025-02-11 NOTE — PROGRESS NOTES
[facility]  [address]  [address]  [facility] [address]  Dept: [phone]    PCP: , Shakira SHELL MD     Chief Complaint   Patient presents with    Established New Doctor    Routine Prenatal Visit     New patient transfer OB lmp 24 colt 25    C/S       Laine Zhang  is a  who presents to the clinic today at Unknown for routine prenatal monitoring. She has no unusual complaints. Reports good fetal movement, denies VB, ctx or LOF. She Has a 9 year old and 5 year old twins - history of 2 c sections and had preeclampsia with her twins. 1 twin was considered iugr. Laine had Influnza last week and has underlying asthma. Is feeling much better. 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 Her last obgyn visit was on 1/15/25 with [facility].     Had 1/24/15 ultrasound with MFM - some views unable to be obtained \"The patient is scheduled in four to six week(s) to complete anatomic survey. Subsequent follow up or other follow up as clinically determined by primary OB provider unless otherwise specified by MFM. \"    Has an epi pen due to sometimes experiencing Hives from grass exposure - has not happened since she was 18.     Taking adderall only for work - plans to wean down end of pregnancy    Patient Active Problem List    Diagnosis Date Noted    Anxiety 2015     Priority: Medium    Former tobacco use 2015     Priority: Low     Overview Note:     Quit early in pregnancy      Body mass index (BMI) of 50-59.9 in adult 2024    Attention deficit hyperactivity disorder (ADHD) 2024    RLTCS 19 F APG 8/9 Wt 4#9, F 8/9 Wt 5#15 2019    Hx C/S x1 (G1) 04/10/2015      Review of Systems   Constitutional:  Negative for chills and fever.   HENT:  Negative for dental problem.    Eyes:  Negative for visual disturbance.   Respiratory:  Negative for chest tightness and shortness of breath.    Cardiovascular:

## 2025-02-18 ENCOUNTER — HOSPITAL ENCOUNTER (OUTPATIENT)
Age: 30
Setting detail: SPECIMEN
Discharge: HOME | End: 2025-02-18

## 2025-02-18 ENCOUNTER — INITIAL PRENATAL (OUTPATIENT)
Dept: OBGYN CLINIC | Age: 30
End: 2025-02-18

## 2025-02-18 VITALS — SYSTOLIC BLOOD PRESSURE: 128 MMHG | WEIGHT: 293 LBS | DIASTOLIC BLOOD PRESSURE: 82 MMHG | BODY MASS INDEX: 51.92 KG/M2

## 2025-02-18 DIAGNOSIS — Z3A.29 29 WEEKS GESTATION OF PREGNANCY: Primary | ICD-10-CM

## 2025-02-18 DIAGNOSIS — Z87.51 HISTORY OF PRETERM DELIVERY: ICD-10-CM

## 2025-02-18 DIAGNOSIS — Z87.59 HISTORY OF PRE-ECLAMPSIA: ICD-10-CM

## 2025-02-18 DIAGNOSIS — O34.219 HISTORY OF CESAREAN DELIVERY AFFECTING PREGNANCY: ICD-10-CM

## 2025-02-18 DIAGNOSIS — Z87.59 HISTORY OF PRIOR PREGNANCY WITH IUGR NEWBORN: ICD-10-CM

## 2025-02-18 DIAGNOSIS — R35.0 FREQUENT URINATION: ICD-10-CM

## 2025-02-18 DIAGNOSIS — Z3A.29 29 WEEKS GESTATION OF PREGNANCY: ICD-10-CM

## 2025-02-18 PROBLEM — F90.9 ATTENTION DEFICIT HYPERACTIVITY DISORDER (ADHD): Status: ACTIVE | Noted: 2024-09-25

## 2025-02-18 LAB
CREAT UR-MCNC: 386 MG/DL (ref 28–217)
TOTAL PROTEIN, URINE: 69 MG/DL
URINE TOTAL PROTEIN CREATININE RATIO: 0.18 (ref 0–0.2)

## 2025-02-18 PROCEDURE — 0502F SUBSEQUENT PRENATAL CARE: CPT

## 2025-02-18 RX ORDER — FAMOTIDINE 20 MG/1
20 TABLET, FILM COATED ORAL 2 TIMES DAILY
COMMUNITY

## 2025-02-18 ASSESSMENT — ENCOUNTER SYMPTOMS
ABDOMINAL PAIN: 0
VOMITING: 0
CHEST TIGHTNESS: 0
SHORTNESS OF BREATH: 0
NAUSEA: 0
BACK PAIN: 0
CONSTIPATION: 0

## 2025-02-19 LAB
MICROORGANISM SPEC CULT: NORMAL
SERVICE CMNT-IMP: NORMAL
SPECIMEN DESCRIPTION: NORMAL

## 2025-03-02 PROBLEM — Z87.59 HISTORY OF PRE-ECLAMPSIA: Status: ACTIVE | Noted: 2025-03-02

## 2025-03-20 ENCOUNTER — TELEPHONE (OUTPATIENT)
Dept: OBGYN CLINIC | Age: 30
End: 2025-03-20

## 2025-03-20 ENCOUNTER — ROUTINE PRENATAL (OUTPATIENT)
Dept: OBGYN CLINIC | Age: 30
End: 2025-03-20

## 2025-03-20 VITALS
SYSTOLIC BLOOD PRESSURE: 130 MMHG | HEART RATE: 82 BPM | WEIGHT: 293 LBS | DIASTOLIC BLOOD PRESSURE: 82 MMHG | BODY MASS INDEX: 53.55 KG/M2

## 2025-03-20 DIAGNOSIS — Z3A.33 33 WEEKS GESTATION OF PREGNANCY: Primary | ICD-10-CM

## 2025-03-20 DIAGNOSIS — B00.9 HSV-2 (HERPES SIMPLEX VIRUS 2) INFECTION: ICD-10-CM

## 2025-03-20 DIAGNOSIS — Z87.891 FORMER TOBACCO USE: ICD-10-CM

## 2025-03-20 DIAGNOSIS — Z40.02 ENCOUNTER FOR PROPHYLACTIC SURGERY FOR RISK FACTOR RELATED TO MALIGNANT NEOPLASM OF OVARY: ICD-10-CM

## 2025-03-20 DIAGNOSIS — I10 CHRONIC HYPERTENSION: ICD-10-CM

## 2025-03-20 DIAGNOSIS — O34.219 HISTORY OF CESAREAN SECTION COMPLICATING PREGNANCY: ICD-10-CM

## 2025-03-20 DIAGNOSIS — R73.09 ELEVATED GLUCOSE TOLERANCE TEST: ICD-10-CM

## 2025-03-20 DIAGNOSIS — Z34.93 PRENATAL CARE IN THIRD TRIMESTER: ICD-10-CM

## 2025-03-20 DIAGNOSIS — J45.909 MODERATE ASTHMA WITHOUT COMPLICATION, UNSPECIFIED WHETHER PERSISTENT: ICD-10-CM

## 2025-03-20 RX ORDER — PRENATAL VIT 91/IRON/FOLIC/DHA 28-975-200
1 COMBINATION PACKAGE (EA) ORAL
COMMUNITY
Start: 2024-09-25

## 2025-03-20 RX ORDER — PROMETHAZINE HYDROCHLORIDE 12.5 MG/1
12.5 TABLET ORAL
COMMUNITY
Start: 2024-10-10

## 2025-03-20 RX ORDER — BUDESONIDE AND FORMOTEROL FUMARATE DIHYDRATE 160; 4.5 UG/1; UG/1
AEROSOL RESPIRATORY (INHALATION)
COMMUNITY
Start: 2025-03-17

## 2025-03-20 NOTE — TELEPHONE ENCOUNTER
Spoke with patient as csection is scheduled for 4.25.25 at 12:30pm. Pt agrees with date and time.

## 2025-03-20 NOTE — PROGRESS NOTES
Attention deficit hyperactivity disorder (ADHD) 09/25/2024    RLTCS 5/24/19 F APG 8/9 Wt 4#9, F 8/9 Wt 5#15 05/24/2019    HSV-2 (herpes simplex virus 2) infection 11/19/2018     Valtrex 36 weeks        Hx C/S x1 (G1) 04/10/2015     Desires repeat, scheduled kaylie/ bhanu 4/25/25         DO Percy Gonzalez OB/[facility]  3/20/2025, 12:31 PM

## 2025-03-24 ENCOUNTER — ROUTINE PRENATAL (OUTPATIENT)
Dept: PERINATAL CARE | Age: 30
End: 2025-03-24
Payer: COMMERCIAL

## 2025-03-24 VITALS
HEART RATE: 80 BPM | SYSTOLIC BLOOD PRESSURE: 128 MMHG | BODY MASS INDEX: 48.82 KG/M2 | DIASTOLIC BLOOD PRESSURE: 70 MMHG | WEIGHT: 293 LBS | TEMPERATURE: 97.7 F | RESPIRATION RATE: 16 BRPM | HEIGHT: 65 IN

## 2025-03-24 DIAGNOSIS — I10 CHRONIC HYPERTENSION: ICD-10-CM

## 2025-03-24 DIAGNOSIS — O34.219 HISTORY OF CESAREAN SECTION COMPLICATING PREGNANCY: ICD-10-CM

## 2025-03-24 DIAGNOSIS — Z3A.34 34 WEEKS GESTATION OF PREGNANCY: Primary | ICD-10-CM

## 2025-03-24 DIAGNOSIS — Z87.891 FORMER TOBACCO USE: ICD-10-CM

## 2025-03-24 DIAGNOSIS — Z87.59 HISTORY OF PRE-ECLAMPSIA: ICD-10-CM

## 2025-03-24 PROCEDURE — 99999 PR OFFICE/OUTPT VISIT,PROCEDURE ONLY: CPT | Performed by: OBSTETRICS & GYNECOLOGY

## 2025-03-24 PROCEDURE — 76811 OB US DETAILED SNGL FETUS: CPT | Performed by: OBSTETRICS & GYNECOLOGY

## 2025-03-24 PROCEDURE — 76819 FETAL BIOPHYS PROFIL W/O NST: CPT | Performed by: OBSTETRICS & GYNECOLOGY

## 2025-03-24 NOTE — PROGRESS NOTES
[facility]AL MED  [address]  [facility] [place] [address]  Dept: [phone]     3/24/2025    RE:  Laine Zhang     : 1995   AGE: 29 y.o.     Dear Dr. Welch,    Thank you for allowing me to see Laine Zhang.  As I'm sure you will recall, Laine Zhang is a 29 y.o. [id number]'s last menstrual period was 2024. Estimated Date of Delivery: 5/3/25 at 34w2d seen in our office today for the following:    REASON FOR VISIT: Level 2 anatomy, BPP,    Patient Active Problem List    Diagnosis Date Noted    Anxiety 2015    Former tobacco use 2015    cHTN (no meds) 2025    Encounter for prophylactic surgery for risk factor related to malignant neoplasm of ovary 2025    Moderate asthma without complication 2025    Elevated glucose tolerance test 2025    Hx iPTD at 36 weeks     History of pre-eclampsia 2025    Body mass index (BMI) of 50-59.9 in adult 2024    Attention deficit hyperactivity disorder (ADHD) 2024    RLTCS 19 F APG 8/9 Wt 4#9, F 8/9 Wt 5#15 2019    HSV-2 (herpes simplex virus 2) infection 2018    Hx C/S x1 (G1) 04/10/2015        PAST HISTORY:  OB History    Para Term  AB Living   5 3 1 1 1 3   SAB IAB Ectopic Molar Multiple Live Births   0 0 0  1 3      # Outcome Date GA Lbr Luis Felipe/2nd Weight Sex Type Anes PTL Lv   5 Current            4 AB 19 5w3d             Birth Comments: EAB   3A  19 36w0d  2.07 kg (4 lb 9 oz) F CS-LTranv Spinal Y HALINA      Birth Comments: PRE-E: ANESTHESIA WENT TOO HIGH; NICU and home 4 day      Complications: IUGR (intrauterine growth restriction) affecting care of mother   3B  19 36w0d  2.722 kg (6 lb) F CS-LTranv  Y HALINA   2 Term 04/09/15 38w0d  3.147 kg (6 lb 15 oz) F CS-LTranv EPI N HALINA      Birth Comments: NICU MECONIUM ASPIRATED      Complications: Fetal Intolerance,

## 2025-03-27 ENCOUNTER — ROUTINE PRENATAL (OUTPATIENT)
Dept: OBGYN CLINIC | Age: 30
End: 2025-03-27
Payer: COMMERCIAL

## 2025-03-27 VITALS
HEART RATE: 65 BPM | DIASTOLIC BLOOD PRESSURE: 76 MMHG | SYSTOLIC BLOOD PRESSURE: 130 MMHG | BODY MASS INDEX: 53.35 KG/M2 | WEIGHT: 293 LBS

## 2025-03-27 DIAGNOSIS — I10 CHRONIC HYPERTENSION: Primary | ICD-10-CM

## 2025-03-27 DIAGNOSIS — Z3A.34 34 WEEKS GESTATION OF PREGNANCY: ICD-10-CM

## 2025-03-27 DIAGNOSIS — Z23 NEED FOR TDAP VACCINATION: ICD-10-CM

## 2025-03-27 PROCEDURE — 3075F SYST BP GE 130 - 139MM HG: CPT | Performed by: STUDENT IN AN ORGANIZED HEALTH CARE EDUCATION/TRAINING PROGRAM

## 2025-03-27 PROCEDURE — 90715 TDAP VACCINE 7 YRS/> IM: CPT | Performed by: STUDENT IN AN ORGANIZED HEALTH CARE EDUCATION/TRAINING PROGRAM

## 2025-03-27 PROCEDURE — 90471 IMMUNIZATION ADMIN: CPT | Performed by: STUDENT IN AN ORGANIZED HEALTH CARE EDUCATION/TRAINING PROGRAM

## 2025-03-27 PROCEDURE — 3078F DIAST BP <80 MM HG: CPT | Performed by: STUDENT IN AN ORGANIZED HEALTH CARE EDUCATION/TRAINING PROGRAM

## 2025-03-27 PROCEDURE — 0502F SUBSEQUENT PRENATAL CARE: CPT | Performed by: STUDENT IN AN ORGANIZED HEALTH CARE EDUCATION/TRAINING PROGRAM

## 2025-03-27 RX ORDER — DEXTROAMPHETAMINE SACCHARATE, AMPHETAMINE ASPARTATE, DEXTROAMPHETAMINE SULFATE AND AMPHETAMINE SULFATE 3.75; 3.75; 3.75; 3.75 MG/1; MG/1; MG/1; MG/1
TABLET ORAL
COMMUNITY
Start: 2025-03-12

## 2025-03-27 NOTE — PROGRESS NOTES
Prenatal Visit    Laine Zhang is a 29 y.o. female  at 34w5d IUP    The patient was seen and evaluated. She has no complaints today. She reports positive fetal movements. She denies contractions, vaginal bleeding and leakage of fluid. Signs and symptoms of labor and pre-eclampsia were reviewed with the patient in detail. She is to report any of these if they occur. She currently denies any of these.    The problem list reflects the active issues addressed during today's visit    Vitals:  BP: 130/76  Weight - Scale: (!) 145.4 kg (320 lb 9.6 oz)  Pulse: 65  Patient Position: Sitting  Movement: Present     PHYSICAL:   General appearance: no apparent distress, alert and cooperative  HEENT: head atraumatic, normocephalic, trachea midline, moist mucous membranes   Neurologic: alert, oriented, normal speech   Lungs: no increased work of breathing,   Abdomen: soft, gravid, non-tender on palpation    Musculoskeletal: no gross abnormalities, range of motion appropriate for age   Psychiatric: mood appropriate, normal affect      NST:  Tracing reviewed for 40 minutes    Tracing nonreactive at first.  Vibra acoustic stimulation used.  15 x 15 accelerations x 2 noted throughout tracing.    Baseline: 150 bpm  Variability: Moderate  Accelerations: Present  Decelerations: Absent  TOCO: Quiet    Tracing reassuring and reactive     Assessment & Plan:  Laine Zhang is a 29 y.o. female  at 34w5d   - VSS    - 28 week labs completed   - Continue taking prenatal vitamins QD    - Continue taking baby aspirin 81 mg QD    - Tdap vaccination: Given today   - Rhogam: Not indicated   -  testing indication: Chronic hypertension not on medication, BMI 53   -Repeat  scheduled 25, RR BS submitted, BP normotensive today    Return for 1 week for routine OB, NST.    Counseling:   - Warning signs reviewed and recommendations when to call or present to the hospital if she experiences signs or symptoms of

## 2025-03-27 NOTE — PROGRESS NOTES
After obtaining verbal consent, and per orders of Dr. Wanda Gonzalez, injection of Tdap 0.5mL given in Right deltoid IM by Linh Soares MA. Patient instructed to remain in clinic for 20 minutes afterwards, and to report any adverse reaction to me immediately.    NDC Tdap 46799-904-66   LOT   EXP 6.14.27    Last seen: 3/20/2025  Next appt: 4/3/2025

## 2025-04-01 ENCOUNTER — ROUTINE PRENATAL (OUTPATIENT)
Dept: PERINATAL CARE | Age: 30
End: 2025-04-01
Payer: COMMERCIAL

## 2025-04-01 VITALS
SYSTOLIC BLOOD PRESSURE: 122 MMHG | RESPIRATION RATE: 16 BRPM | WEIGHT: 293 LBS | HEIGHT: 65 IN | DIASTOLIC BLOOD PRESSURE: 63 MMHG | TEMPERATURE: 97.9 F | HEART RATE: 69 BPM | BODY MASS INDEX: 48.82 KG/M2

## 2025-04-01 DIAGNOSIS — Z87.891 FORMER TOBACCO USE: ICD-10-CM

## 2025-04-01 DIAGNOSIS — I10 CHRONIC HYPERTENSION: ICD-10-CM

## 2025-04-01 DIAGNOSIS — O34.219 HISTORY OF CESAREAN SECTION COMPLICATING PREGNANCY: ICD-10-CM

## 2025-04-01 DIAGNOSIS — Z3A.35 35 WEEKS GESTATION OF PREGNANCY: Primary | ICD-10-CM

## 2025-04-01 DIAGNOSIS — Z87.59 HISTORY OF PRE-ECLAMPSIA: ICD-10-CM

## 2025-04-01 PROCEDURE — 99999 PR OFFICE/OUTPT VISIT,PROCEDURE ONLY: CPT | Performed by: OBSTETRICS & GYNECOLOGY

## 2025-04-01 PROCEDURE — 76819 FETAL BIOPHYS PROFIL W/O NST: CPT | Performed by: OBSTETRICS & GYNECOLOGY

## 2025-04-01 NOTE — PROGRESS NOTES
[facility] MATERNAL FETAL MED   [facility] MATERNAL FETAL MED  [address]  [place] [address]  Dept: [phone]     2025    RE:  Laine Zhang     : 1995   AGE: 29 y.o.     Dear Dr. Welch,    Thank you for allowing me to see Laine Zhang.  As I'm sure you will recall, Laine Zhang is a 29 y.o. [id number]'s last menstrual period was 2024. 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 2025    Moderate asthma without complication 2025    Elevated glucose tolerance test 2025    Hx iPTD at 36 weeks     History of pre-eclampsia 2025    Body mass index (BMI) of 50-59.9 in adult 2024    Attention deficit hyperactivity disorder (ADHD) 2024    RLTCS 19 F APG 8/9 Wt 4#9, F 8/9 Wt 5#15 2019    HSV-2 (herpes simplex virus 2) infection 2018    Hx C/S x2(G1) 04/10/2015        PAST HISTORY:  OB History    Para Term  AB Living   5 3 1 1 1 3   SAB IAB Ectopic Molar Multiple Live Births   0 0 0  1 3      # Outcome Date GA Lbr Luis Felipe/2nd Weight Sex Type Anes PTL Lv   5 Current            4 AB 19 5w3d             Birth Comments: EAB   3A  19 36w0d  2.07 kg (4 lb 9 oz) F CS-LTranv Spinal Y HALINA      Birth Comments: PRE-E: ANESTHESIA WENT TOO HIGH; NICU and home 4 day      Complications: IUGR (intrauterine growth restriction) affecting care of mother   3B  19 36w0d  2.722 kg (6 lb) F CS-LTranv  Y HALINA   2 Term 04/09/15 38w0d  3.147 kg (6 lb 15 oz) F CS-LTranv EPI N HALINA      Birth Comments: NICU

## 2025-04-02 SDOH — ECONOMIC STABILITY: INCOME INSECURITY: IN THE LAST 12 MONTHS, WAS THERE A TIME WHEN YOU WERE NOT ABLE TO PAY THE MORTGAGE OR RENT ON TIME?: NO

## 2025-04-02 SDOH — ECONOMIC STABILITY: FOOD INSECURITY: WITHIN THE PAST 12 MONTHS, THE FOOD YOU BOUGHT JUST DIDN'T LAST AND YOU DIDN'T HAVE MONEY TO GET MORE.: NEVER TRUE

## 2025-04-02 SDOH — ECONOMIC STABILITY: TRANSPORTATION INSECURITY
IN THE PAST 12 MONTHS, HAS LACK OF TRANSPORTATION KEPT YOU FROM MEETINGS, WORK, OR FROM GETTING THINGS NEEDED FOR DAILY LIVING?: NO

## 2025-04-02 SDOH — ECONOMIC STABILITY: FOOD INSECURITY: WITHIN THE PAST 12 MONTHS, YOU WORRIED THAT YOUR FOOD WOULD RUN OUT BEFORE YOU GOT MONEY TO BUY MORE.: NEVER TRUE

## 2025-04-02 SDOH — ECONOMIC STABILITY: TRANSPORTATION INSECURITY
IN THE PAST 12 MONTHS, HAS THE LACK OF TRANSPORTATION KEPT YOU FROM MEDICAL APPOINTMENTS OR FROM GETTING MEDICATIONS?: NO

## 2025-04-03 ENCOUNTER — ROUTINE PRENATAL (OUTPATIENT)
Dept: OBGYN CLINIC | Age: 30
End: 2025-04-03
Payer: COMMERCIAL

## 2025-04-03 VITALS
BODY MASS INDEX: 53.55 KG/M2 | DIASTOLIC BLOOD PRESSURE: 80 MMHG | WEIGHT: 293 LBS | HEART RATE: 70 BPM | SYSTOLIC BLOOD PRESSURE: 137 MMHG

## 2025-04-03 DIAGNOSIS — B00.9 HSV-2 (HERPES SIMPLEX VIRUS 2) INFECTION: ICD-10-CM

## 2025-04-03 DIAGNOSIS — Z3A.35 35 WEEKS GESTATION OF PREGNANCY: ICD-10-CM

## 2025-04-03 DIAGNOSIS — I10 CHRONIC HYPERTENSION: Primary | ICD-10-CM

## 2025-04-03 DIAGNOSIS — Z34.93 PRENATAL CARE IN THIRD TRIMESTER, UNSPECIFIED GRAVIDITY: ICD-10-CM

## 2025-04-03 PROCEDURE — 3075F SYST BP GE 130 - 139MM HG: CPT | Performed by: STUDENT IN AN ORGANIZED HEALTH CARE EDUCATION/TRAINING PROGRAM

## 2025-04-03 PROCEDURE — 0502F SUBSEQUENT PRENATAL CARE: CPT | Performed by: STUDENT IN AN ORGANIZED HEALTH CARE EDUCATION/TRAINING PROGRAM

## 2025-04-03 PROCEDURE — 59025 FETAL NON-STRESS TEST: CPT | Performed by: STUDENT IN AN ORGANIZED HEALTH CARE EDUCATION/TRAINING PROGRAM

## 2025-04-03 PROCEDURE — 3079F DIAST BP 80-89 MM HG: CPT | Performed by: STUDENT IN AN ORGANIZED HEALTH CARE EDUCATION/TRAINING PROGRAM

## 2025-04-03 RX ORDER — VALACYCLOVIR HYDROCHLORIDE 500 MG/1
500 TABLET, FILM COATED ORAL 2 TIMES DAILY
Qty: 60 TABLET | Refills: 0 | Status: SHIPPED | OUTPATIENT
Start: 2025-04-03

## 2025-04-03 NOTE — PROGRESS NOTES
Prenatal Visit    Laine Zhang is a 29 y.o. female  at 35w5d    The patient was seen and evaluated. She has no complaints today. She reports positive fetal movements. She denies contractions, vaginal bleeding and leakage of fluid. Signs and symptoms of labor and pre-eclampsia were reviewed with the patient in detail. She currently denies any signs or symptoms of pre-clampsia including headache, vision changes, RUQ pain.     The problem list reflects the active issues addressed during today's visit.    Allergies:  Allergies   Allergen Reactions    Ancef [Cefazolin] Anaphylaxis       Vitals:  BP: 137/80  Weight - Scale: (!) 146 kg (321 lb 12.8 oz)  Pulse: 70  Patient Position: Sitting  Movement: Present     NST:  Tracing reviewed for 40 minutes    Baseline: Periods of 145 and 150  Variability: Moderate throughout  Accelerations: 2 15 x 15 accelerations  Decelerations: Absent  TOCO: Quiet    Tracing reassuring and reactive      PHYSICAL:   General appearance: no apparent distress, alert and cooperative  HEENT: head atraumatic, normocephalic, trachea midline, moist mucous membranes   Neurologic: alert, oriented, normal speech   Lungs: no increased work of breathing,   Abdomen: soft, gravid, non-tender on palpation    Musculoskeletal: no gross abnormalities, range of motion appropriate for age   Psychiatric: mood appropriate, normal affect      Assessment & Plan:  Laine Zhang is a 29 y.o. female  at 35w5d IUP   - VSS   - GBS to be collected at next visit  - Continue taking prenatal vitamins QD   - Tdap vaccination: 3/27/2025   - Rhogam: not indicated   -  testing indication: BMI, cHTN on no medications    -Patient following with [facility] for BPP  Return for Follow-up in 1 week for routine OB NST.    COUNSELING:   - Warning signs reviewed and recommendations when to call or present to the hospital if she experiences signs or symptoms of  labor and pre-eclampsia were reviewed.       Orders

## 2025-04-07 ENCOUNTER — HOSPITAL ENCOUNTER (OUTPATIENT)
Age: 30
Setting detail: SPECIMEN
Discharge: HOME | End: 2025-04-07

## 2025-04-07 ENCOUNTER — ROUTINE PRENATAL (OUTPATIENT)
Dept: OBGYN CLINIC | Age: 30
End: 2025-04-07
Payer: COMMERCIAL

## 2025-04-07 VITALS — BODY MASS INDEX: 53.65 KG/M2 | SYSTOLIC BLOOD PRESSURE: 133 MMHG | DIASTOLIC BLOOD PRESSURE: 78 MMHG | WEIGHT: 293 LBS

## 2025-04-07 DIAGNOSIS — O10.919 CHRONIC HYPERTENSION AFFECTING PREGNANCY: ICD-10-CM

## 2025-04-07 DIAGNOSIS — B00.9 HSV-2 (HERPES SIMPLEX VIRUS 2) INFECTION: ICD-10-CM

## 2025-04-07 DIAGNOSIS — Z3A.36 36 WEEKS GESTATION OF PREGNANCY: ICD-10-CM

## 2025-04-07 DIAGNOSIS — Z87.59 HISTORY OF PRE-ECLAMPSIA: ICD-10-CM

## 2025-04-07 DIAGNOSIS — O09.93 SUPERVISION OF HIGH RISK PREGNANCY IN THIRD TRIMESTER: Primary | ICD-10-CM

## 2025-04-07 LAB
ALBUMIN SERPL-MCNC: 3.6 G/DL (ref 3.5–5.2)
ALBUMIN/GLOB SERPL: 1.1 {RATIO} (ref 1–2.5)
ALP SERPL-CCNC: 88 U/L (ref 35–104)
ALT SERPL-CCNC: 19 U/L (ref 10–35)
ANION GAP SERPL CALCULATED.3IONS-SCNC: 10 MMOL/L (ref 9–16)
AST SERPL-CCNC: 16 U/L (ref 10–35)
BILIRUB SERPL-MCNC: 0.3 MG/DL (ref 0–1.2)
BUN SERPL-MCNC: 9 MG/DL (ref 6–20)
CALCIUM SERPL-MCNC: 9.1 MG/DL (ref 8.6–10.4)
CHLORIDE SERPL-SCNC: 102 MMOL/L (ref 98–107)
CO2 SERPL-SCNC: 25 MMOL/L (ref 20–31)
CREAT SERPL-MCNC: 0.7 MG/DL (ref 0.6–0.9)
CREAT UR-MCNC: 128 MG/DL (ref 28–217)
ERYTHROCYTE [DISTWIDTH] IN BLOOD BY AUTOMATED COUNT: 14.5 % (ref 11.8–14.4)
GFR, ESTIMATED: >90 ML/MIN/1.73M2
GLUCOSE SERPL-MCNC: 65 MG/DL (ref 74–99)
HCT VFR BLD AUTO: 33.9 % (ref 36.3–47.1)
HGB BLD-MCNC: 10.6 G/DL (ref 11.9–15.1)
MCH RBC QN AUTO: 27.5 PG (ref 25.2–33.5)
MCHC RBC AUTO-ENTMCNC: 31.3 G/DL (ref 28.4–34.8)
MCV RBC AUTO: 87.8 FL (ref 82.6–102.9)
NRBC BLD-RTO: 0 PER 100 WBC
PLATELET # BLD AUTO: 212 K/UL (ref 138–453)
PMV BLD AUTO: 11.8 FL (ref 8.1–13.5)
POTASSIUM SERPL-SCNC: 4.2 MMOL/L (ref 3.7–5.3)
PROT SERPL-MCNC: 6.9 G/DL (ref 6.6–8.7)
RBC # BLD AUTO: 3.86 M/UL (ref 3.95–5.11)
SODIUM SERPL-SCNC: 137 MMOL/L (ref 136–145)
TOTAL PROTEIN, URINE: 13 MG/DL
URINE TOTAL PROTEIN CREATININE RATIO: 0.1 (ref 0–0.2)
WBC OTHER # BLD: 8 K/UL (ref 3.5–11.3)

## 2025-04-07 PROCEDURE — 0502F SUBSEQUENT PRENATAL CARE: CPT | Performed by: NURSE PRACTITIONER

## 2025-04-07 PROCEDURE — 3078F DIAST BP <80 MM HG: CPT | Performed by: NURSE PRACTITIONER

## 2025-04-07 PROCEDURE — 59025 FETAL NON-STRESS TEST: CPT | Performed by: NURSE PRACTITIONER

## 2025-04-07 PROCEDURE — 3075F SYST BP GE 130 - 139MM HG: CPT | Performed by: NURSE PRACTITIONER

## 2025-04-07 NOTE — PROGRESS NOTES
Prenatal Visit    Laine Zhang is a 29 y.o. female  at 36w2d    The patient was seen and evaluated. Reports positive fetal movements. She denies headache, , RUQ pain, contractions, vaginal bleeding and leakage of fluid.    She says there has been a couple of episodes where she is \"seeing starts\"  at home her systolic was 188 but it returned to normal    The patient already received the T-Dap Vaccine (27-36 weeks) this pregnancy.     The problem list reflects the active issues addressed during today's visit.    Allergies:  Allergies   Allergen Reactions    Ancef [Cefazolin] Anaphylaxis       Vitals:    BP: 133/78  Weight - Scale: (!) 146.2 kg (322 lb 6.4 oz)  Fetal HR: RNST  Movement: Present   NST: 20 min FHR tracing  Fetal heart rate baseline: 145 moderate variability, accelerations present, decelerations variable mild <10 sec    The tracing has been reviewed and is considered reactive.   Labs:  Group Beta Strep collection was done.  Sensitivities for clindamycin and erythromycin were ordered.    Assessment & Plan:  Laine Zhang is a 29 y.o. female  at 36w2d   - GBS testing was ordered.  Patient Active Problem List    Diagnosis Date Noted    Anxiety 2015     Priority: Medium    Former tobacco use 2015     Priority: Low     Quit early in pregnancy      35 weeks gestation of pregnancy 2025    cHTN (no meds) 2025     Patient states she was on lisinopril and Lopressor for up to 9 months after her last delivery      Encounter for prophylactic surgery for risk factor related to malignant neoplasm of ovary 2025     Desires RRBS, form submitted 3/20/25      Moderate asthma without complication 2025    Elevated glucose tolerance test 2025     Elevated 1 hour glucose, unable to complete 3-hour glucose test      Hx iPTD at 36 weeks      Di/Di twins, FGR      History of pre-eclampsia 2025    Body mass index (BMI) of 50-59.9 in adult 2024     NST 34

## 2025-04-08 ENCOUNTER — RESULTS FOLLOW-UP (OUTPATIENT)
Dept: OBGYN CLINIC | Age: 30
End: 2025-04-08

## 2025-04-08 DIAGNOSIS — O99.820 GBS (GROUP B STREPTOCOCCUS CARRIER), +RV CULTURE, CURRENTLY PREGNANT: Primary | ICD-10-CM

## 2025-04-09 ENCOUNTER — ROUTINE PRENATAL (OUTPATIENT)
Dept: PERINATAL CARE | Age: 30
End: 2025-04-09
Payer: COMMERCIAL

## 2025-04-09 VITALS
HEART RATE: 76 BPM | BODY MASS INDEX: 48.82 KG/M2 | DIASTOLIC BLOOD PRESSURE: 74 MMHG | WEIGHT: 293 LBS | SYSTOLIC BLOOD PRESSURE: 123 MMHG | HEIGHT: 65 IN | RESPIRATION RATE: 16 BRPM | TEMPERATURE: 97.5 F

## 2025-04-09 DIAGNOSIS — Z3A.36 36 WEEKS GESTATION OF PREGNANCY: Primary | ICD-10-CM

## 2025-04-09 DIAGNOSIS — Z87.59 HISTORY OF PRE-ECLAMPSIA: ICD-10-CM

## 2025-04-09 DIAGNOSIS — O34.219 HISTORY OF CESAREAN SECTION COMPLICATING PREGNANCY: ICD-10-CM

## 2025-04-09 DIAGNOSIS — I10 CHRONIC HYPERTENSION: ICD-10-CM

## 2025-04-09 LAB
MICROORGANISM SPEC CULT: ABNORMAL
SERVICE CMNT-IMP: ABNORMAL
SPECIMEN DESCRIPTION: ABNORMAL

## 2025-04-09 PROCEDURE — 99999 PR OFFICE/OUTPT VISIT,PROCEDURE ONLY: CPT | Performed by: OBSTETRICS & GYNECOLOGY

## 2025-04-09 PROCEDURE — 76819 FETAL BIOPHYS PROFIL W/O NST: CPT | Performed by: OBSTETRICS & GYNECOLOGY

## 2025-04-09 NOTE — PROGRESS NOTES
PT did not provide urine at time of vitals  
Please refer to attached ultrasound report for doctor's evaluation of the clinical information obtained by vital signs, ultrasound, and/or non-stress test along with management recommendation.  
(325 lb)   LMP 07/27/2024   Body mass index is 54.08 kg/m².    Urine dipstick:  No results found for this visit on 04/09/25.     IMPRESSION:  1. Lazo fetus in a cephalic presentation. Fetal motion and cardiac motion is seen and appears normal.  2. The placenta is anterior. The amniotic fluid is normal.  3. Biophysical profile is 8/8.     RECOMMENDATIONS:  Each of the recommendations were discussed with the patient:  1.  Continue weekly antepartum testing including nonstress test.  2.  Growth ultrasounds every 4 weeks.  3.  Delivery at 39 weeks gestation.  4.  Follow-up would be otherwise as clinically indicated.    The patient is to continue to follow with you in your office for ongoing obstetric care.     PLAN:    As noted above or sooner prn.    Sincerely,        Matthew Aggarwal MD

## 2025-04-10 PROBLEM — O99.820 GBS (GROUP B STREPTOCOCCUS CARRIER), +RV CULTURE, CURRENTLY PREGNANT: Status: ACTIVE | Noted: 2025-04-10

## 2025-04-14 LAB
MICROORGANISM SPEC CULT: ABNORMAL
SERVICE CMNT-IMP: ABNORMAL
SPECIMEN DESCRIPTION: ABNORMAL

## 2025-04-14 NOTE — PROGRESS NOTES
Laine is a  @ 37w3d who presents for KIA visit.  She denies LOF, VB or Ctxs.  + FM.  She sometimes has some cramping.  She denies any fevers/chills, SOB, cough, sore throat, loss of taste/smell or sick contacts.  Pt denies any HA, vision changes or RUQ pain.     O:  Vitals:    04/15/25 1019   BP: 132/79   Pulse: 81     Gen: NAD  Abd: soft, nontender, gravid  Ext:  no edema    NST: 130, mod variability, accels, no decels.  Category 1 FHTs, reactive.   BP: 132/79  Weight - Scale: (!) 147.9 kg (326 lb)  Pulse: 81  Patient Position: Sitting  Fetal HR: rnst  Movement: Present    A/P:  Patient Active Problem List    Diagnosis Date Noted    Anxiety 2015     Priority: Medium    Former tobacco use 2015     Priority: Low     Quit early in pregnancy      GBS (group B Streptococcus carrier), +RV culture, currently pregnant 04/10/2025     Will need abx in labor  Susceptibility is pending       36 weeks gestation of pregnancy 2025    cHTN (no meds) 2025     Patient states she was on lisinopril and Lopressor for up to 9 months after her last delivery      Encounter for prophylactic surgery for risk factor related to malignant neoplasm of ovary 2025     Desires RRBS, form submitted 3/20/25      Moderate asthma without complication 2025    Elevated glucose tolerance test 2025     Elevated 1 hour glucose, unable to complete 3-hour glucose test      Hx iPTD at 36 weeks      Di/Di twins, FGR      History of pre-eclampsia 2025    Body mass index (BMI) of 50-59.9 in adult 2024     NST 34 weeks  Late STARR first MFM appointment 2025.  Reach out to office to see if we could have her be seen sooner.      Attention deficit hyperactivity disorder (ADHD) 2024    RLTCS 19 F APG 8/9 Wt 4#9, F 8/9 Wt 5#15 2019    HSV-2 (herpes simplex virus 2) infection 2018     Valtrex 36 weeks        Hx C/S x2(G1) 04/10/2015     Desires repeat, scheduled w/ bhanu 25

## 2025-04-15 ENCOUNTER — ROUTINE PRENATAL (OUTPATIENT)
Dept: OBGYN CLINIC | Age: 30
End: 2025-04-15

## 2025-04-15 VITALS
DIASTOLIC BLOOD PRESSURE: 79 MMHG | SYSTOLIC BLOOD PRESSURE: 132 MMHG | WEIGHT: 293 LBS | BODY MASS INDEX: 54.25 KG/M2 | HEART RATE: 81 BPM

## 2025-04-15 DIAGNOSIS — O09.93 SUPERVISION OF HIGH RISK PREGNANCY IN THIRD TRIMESTER: ICD-10-CM

## 2025-04-15 DIAGNOSIS — Z3A.37 37 WEEKS GESTATION OF PREGNANCY: Primary | ICD-10-CM

## 2025-04-15 PROCEDURE — 0502F SUBSEQUENT PRENATAL CARE: CPT | Performed by: OBSTETRICS & GYNECOLOGY

## 2025-04-16 ENCOUNTER — ROUTINE PRENATAL (OUTPATIENT)
Dept: PERINATAL CARE | Age: 30
End: 2025-04-16
Payer: COMMERCIAL

## 2025-04-16 VITALS
HEIGHT: 65 IN | BODY MASS INDEX: 48.82 KG/M2 | WEIGHT: 293 LBS | RESPIRATION RATE: 16 BRPM | DIASTOLIC BLOOD PRESSURE: 61 MMHG | HEART RATE: 70 BPM | TEMPERATURE: 97.5 F | SYSTOLIC BLOOD PRESSURE: 122 MMHG

## 2025-04-16 DIAGNOSIS — I10 CHRONIC HYPERTENSION: ICD-10-CM

## 2025-04-16 DIAGNOSIS — Z98.891 S/P REPEAT LOW TRANSVERSE C-SECTION: ICD-10-CM

## 2025-04-16 DIAGNOSIS — O34.219 HISTORY OF CESAREAN SECTION COMPLICATING PREGNANCY: ICD-10-CM

## 2025-04-16 DIAGNOSIS — Z87.59 HISTORY OF PRE-ECLAMPSIA: ICD-10-CM

## 2025-04-16 DIAGNOSIS — Z3A.37 37 WEEKS GESTATION OF PREGNANCY: Primary | ICD-10-CM

## 2025-04-16 PROCEDURE — 76819 FETAL BIOPHYS PROFIL W/O NST: CPT | Performed by: OBSTETRICS & GYNECOLOGY

## 2025-04-16 NOTE — PROGRESS NOTES
[facility] MATERNAL FETAL MED   [facility] MATERNAL FETAL MED  [address]  [facility] [address]  Dept: [phone]     2025    RE:  Laine Zhang     : 1995   AGE: 29 y.o.     Dear Dr. Land,    Thank you for allowing me to see Laine Zhang.  As I'm sure you will recall, Laine Zhang is a 29 y.o. [id number]'s last menstrual period was 2024. Estimated Date of Delivery: 5/3/25 at 37w4d seen in our office today for the following:    REASON FOR VISIT: BPP Only    Patient Active Problem List    Diagnosis Date Noted    Anxiety 2015    Former tobacco use 2015    GBS (group B Streptococcus carrier), +RV culture, currently pregnant 04/10/2025    37 weeks gestation of pregnancy 2025    cHTN (no meds) 2025    Encounter for prophylactic surgery for risk factor related to malignant neoplasm of ovary 2025    Moderate asthma without complication 2025    Elevated glucose tolerance test 2025    Hx iPTD at 36 weeks     History of pre-eclampsia 2025    Body mass index (BMI) of 50-59.9 in adult 2024    Attention deficit hyperactivity disorder (ADHD) 2024    RLTCS 19 F APG 8/9 Wt 4#9, F 8/9 Wt 5#15 2019    HSV-2 (herpes simplex virus 2) infection 2018    Hx C/S x2(G1) 04/10/2015        PAST HISTORY:  OB History    Para Term  AB Living   5 3 1 1 1 3   SAB IAB Ectopic Molar Multiple Live Births   0 0 0  1 3      # Outcome Date GA Lbr Luis Felipe/2nd Weight Sex Type Anes PTL Lv   5 Current            4 AB 19 5w3d             Birth Comments: EAB   3A  19 36w0d  2.07 kg (4 lb 9 oz) F CS-LTranv Spinal Y HALINA      Birth Comments: PRE-E: ANESTHESIA WENT TOO HIGH; NICU and home 4 day      Complications: IUGR (intrauterine growth restriction) affecting care of mother   3B  19 36w0d  2.722 kg (6 lb) F CS-LTranv  Y HALINA   2 Term

## 2025-04-19 NOTE — PROGRESS NOTES
Laine is a  @ 38w2d who presents for KIA visit.  She denies LOF, VB or Ctxs.  + FM.  She is excited/nervous for her  later this week.  She denies any fevers/chills, SOB, cough, sore throat, loss of taste/smell or sick contacts.  Pt denies any HA, vision changes or RUQ pain.     O:  Vitals:    25 0947   BP: 137/80   Pulse: 78     Gen: NAD  Abd: soft, nontender, gravid  Ext:  mild edema    NST: 145, mod variability, accels, no decels.  Category 1 FHTs, reactive.    BP: 137/80  Weight - Scale: (!) 147.9 kg (326 lb)  Pulse: 78  Patient Position: Sitting  Fetal HR: rnst  Movement: Present    A/P:  Patient Active Problem List    Diagnosis Date Noted    Anxiety 2015     Priority: Medium    Former tobacco use 2015     Priority: Low     Quit early in pregnancy      GBS (group B Streptococcus carrier), +RV culture, currently pregnant 04/10/2025     Will need abx in labor  Susceptibility is pending       37 weeks gestation of pregnancy 2025    cHTN (no meds) 2025     Patient states she was on lisinopril and Lopressor for up to 9 months after her last delivery      Encounter for prophylactic surgery for risk factor related to malignant neoplasm of ovary 2025     Desires RRBS, form submitted 3/20/25      Moderate asthma without complication 2025    Elevated glucose tolerance test 2025     Elevated 1 hour glucose, unable to complete 3-hour glucose test      Hx iPTD at 36 weeks      Di/Di twins, FGR      History of pre-eclampsia 2025    Body mass index (BMI) of 50-59.9 in adult 2024     NST 34 weeks  Late STARR first MFM appointment 2025.  Reach out to office to see if we could have her be seen sooner.      Attention deficit hyperactivity disorder (ADHD) 2024    RLTCS 19 F APG 8/9 Wt 4#9, F 8/9 Wt 5#15 2019    HSV-2 (herpes simplex virus 2) infection 2018     Valtrex 36 weeks        Hx C/S x2(G1) 04/10/2015     Desires repeat,

## 2025-04-19 NOTE — PROGRESS NOTES
[unfilled] is a G***P***  @ *** who presents for KIA visit.  She denies LOF, VB or Ctxs.  + FM.  ***.  She denies any fevers/chills, SOB, cough, sore throat, loss of taste/smell or sick contacts.  Pt denies any HA, vision changes or RUQ pain.     O:There were no vitals filed for this visit.  Gen: NAD  Abd: soft, nontender, gravid  Ext:  *** edema    FHT: ***  FH: ***       A/P:  Patient Active Problem List    Diagnosis Date Noted    Anxiety 04/08/2015     Priority: Medium    Former tobacco use 04/08/2015     Priority: Low     Quit early in pregnancy      GBS (group B Streptococcus carrier), +RV culture, currently pregnant 04/10/2025     Will need abx in labor  Susceptibility is pending       37 weeks gestation of pregnancy 03/24/2025    cHTN (no meds) 03/20/2025     Patient states she was on lisinopril and Lopressor for up to 9 months after her last delivery      Encounter for prophylactic surgery for risk factor related to malignant neoplasm of ovary 03/20/2025     Desires RRBS, form submitted 3/20/25      Moderate asthma without complication 03/20/2025    Elevated glucose tolerance test 03/20/2025     Elevated 1 hour glucose, unable to complete 3-hour glucose test      Hx iPTD at 36 weeks      Di/Di twins, FGR      History of pre-eclampsia 03/02/2025    Body mass index (BMI) of 50-59.9 in adult 12/18/2024     NST 34 weeks  Late STARR first MFM appointment 4/8/2025.  Reach out to office to see if we could have her be seen sooner.      Attention deficit hyperactivity disorder (ADHD) 09/25/2024    RLTCS 5/24/19 F APG 8/9 Wt 4#9, F 8/9 Wt 5#15 05/24/2019    HSV-2 (herpes simplex virus 2) infection 11/19/2018     Valtrex 36 weeks        Hx C/S x2(G1) 04/10/2015     Desires repeat, scheduled w/ bhanu 4/25/25         - Tdap vaccination: ***  - Influenza vaccination: ***  - Rhogam: ***  - COVID-19 vaccination:   Advise booster during pregnancy  - RSV vaccination (32-36 weeks): The patient was counseled on

## 2025-04-21 ENCOUNTER — ROUTINE PRENATAL (OUTPATIENT)
Dept: OBGYN CLINIC | Age: 30
End: 2025-04-21

## 2025-04-21 VITALS
SYSTOLIC BLOOD PRESSURE: 137 MMHG | DIASTOLIC BLOOD PRESSURE: 80 MMHG | BODY MASS INDEX: 54.25 KG/M2 | HEART RATE: 78 BPM | WEIGHT: 293 LBS

## 2025-04-21 DIAGNOSIS — Z3A.38 38 WEEKS GESTATION OF PREGNANCY: ICD-10-CM

## 2025-04-21 DIAGNOSIS — O09.93 SUPERVISION OF HIGH RISK PREGNANCY IN THIRD TRIMESTER: Primary | ICD-10-CM

## 2025-04-21 PROCEDURE — 0502F SUBSEQUENT PRENATAL CARE: CPT | Performed by: OBSTETRICS & GYNECOLOGY

## 2025-04-23 ENCOUNTER — ROUTINE PRENATAL (OUTPATIENT)
Dept: PERINATAL CARE | Age: 30
End: 2025-04-23
Payer: COMMERCIAL

## 2025-04-23 VITALS
HEART RATE: 75 BPM | TEMPERATURE: 97.3 F | SYSTOLIC BLOOD PRESSURE: 129 MMHG | DIASTOLIC BLOOD PRESSURE: 72 MMHG | BODY MASS INDEX: 48.82 KG/M2 | RESPIRATION RATE: 16 BRPM | HEIGHT: 65 IN | WEIGHT: 293 LBS

## 2025-04-23 DIAGNOSIS — I10 CHRONIC HYPERTENSION: ICD-10-CM

## 2025-04-23 DIAGNOSIS — Z87.59 HISTORY OF PRE-ECLAMPSIA: ICD-10-CM

## 2025-04-23 DIAGNOSIS — Z3A.38 38 WEEKS GESTATION OF PREGNANCY: Primary | ICD-10-CM

## 2025-04-23 DIAGNOSIS — O34.219 HISTORY OF CESAREAN SECTION COMPLICATING PREGNANCY: ICD-10-CM

## 2025-04-23 PROCEDURE — 76816 OB US FOLLOW-UP PER FETUS: CPT | Performed by: OBSTETRICS & GYNECOLOGY

## 2025-04-23 PROCEDURE — 76819 FETAL BIOPHYS PROFIL W/O NST: CPT | Performed by: OBSTETRICS & GYNECOLOGY

## 2025-04-23 NOTE — PROGRESS NOTES
[facility] MATERNAL FETAL MED  [address]  [place] [facility] [address]  Dept: [phone]     2025    RE:  Laine Zhang     : 1995   AGE: 29 y.o.     Dear Dr. Land,    Thank you for allowing me to see Laine Zhang.  As I'm sure you will recall, Laine Zhang is a 29 y.o. [id number]'s last menstrual period was 2024. Estimated Date of Delivery: 5/3/25 at 38w4d seen in our office today for the following:    REASON FOR VISIT: Growth     Patient Active Problem List    Diagnosis Date Noted    Anxiety 2015    Former tobacco use 2015    GBS (group B Streptococcus carrier), +RV culture, currently pregnant 04/10/2025    38 weeks gestation of pregnancy 2025    cHTN (no meds) 2025    Encounter for prophylactic surgery for risk factor related to malignant neoplasm of ovary 2025    Moderate asthma without complication 2025    Elevated glucose tolerance test 2025    Hx iPTD at 36 weeks     History of pre-eclampsia 2025    Body mass index (BMI) of 50-59.9 in adult 2024    Attention deficit hyperactivity disorder (ADHD) 2024    RLTCS 19 F APG 8/9 Wt 4#9, F 8/9 Wt 5#15 2019    HSV-2 (herpes simplex virus 2) infection 2018    Hx C/S x2(G1) 04/10/2015        PAST HISTORY:  OB History    Para Term  AB Living   5 3 1 1 1 3   SAB IAB Ectopic Molar Multiple Live Births   0 0 0  1 3      # Outcome Date GA Lbr Luis Felipe/2nd Weight Sex Type Anes PTL Lv   5 Current            4 AB 19 5w3d             Birth Comments: EAB   3A  19 36w0d  2.07 kg (4 lb 9 oz) F CS-LTranv Spinal Y HALINA      Birth Comments: PRE-E: ANESTHESIA WENT TOO HIGH; NICU and home 4 day      Complications: IUGR (intrauterine growth restriction) affecting care of mother   3B  19 36w0d  2.722 kg (6 lb) F CS-LTranv  Y HALINA   2 Term 04/09/15 38w0d  3.147 kg (6 lb 15

## 2025-04-24 ENCOUNTER — ANESTHESIA EVENT (OUTPATIENT)
Dept: LABOR AND DELIVERY | Age: 30
End: 2025-04-24
Payer: COMMERCIAL

## 2025-04-24 RX ORDER — TRANEXAMIC ACID 10 MG/ML
1000 INJECTION, SOLUTION INTRAVENOUS ONCE
Status: CANCELLED | OUTPATIENT
Start: 2025-04-24 | End: 2025-04-24

## 2025-04-24 NOTE — DISCHARGE SUMMARY
Obstetric Discharge Summary  [facility]    Patient Name: Laine Zhang  Patient : 1995  Primary Care Physician: Shakira Flores MD  Admit Date: 2025    Principal Diagnosis: IUP at 38w6d, admitted for repeat CS w/ RRS 2/2 cHTN (no meds) with a hx of CS x 2 and declines TOLAC      Her pregnancy has been complicated by:   Patient Active Problem List   Diagnosis    Anxiety    Former tobacco use    Hx C/S x3    HSV-2 (herpes simplex virus 2) infection    RLTCS w/ RRS 25 M Apg 8/9 Wt 8#9    Attention deficit hyperactivity disorder (ADHD)    Body mass index (BMI) of 50-59.9 in adult (HCC)    History of pre-eclampsia    cHTN (no meds)    Hx iPTD at 36 weeks    Moderate asthma without complication    Elevated glucose tolerance test    GBS (group B Streptococcus carrier), +RV culture, currently pregnant       Infection Present?: No  Hospital Acquired: N/A    Surgical Operations & Procedures:  Analgesia: spinal  Delivery Type:  Delivery: See Labor and Delivery Summary   Laceration(s): Absent    Consultations: NICU and Anesthesia    Pertinent Findings & Procedures:   Laine Zhang is a 29 y.o. female  at 38w6d admitted for repeat CS w/ RRS 2/2 cHTN (no meds) with a hx of CS x 2 and declines TOLAC ; received Gent and clida, pepcid, tylenol, bicitra, scop patch and TXA preoperatively.     PreE labs on admission were wnl, P/C 0.11    She delivered by repeat low transverse  a Live Born infant on 25.       Information for the patient's :  Stephen Zhang [[id number]]   male   Birth Weight: 3.885 kg (8 lb 9 oz)    Apgars: 8 at 1 minute and 9 at 5 minutes.     ERAS for  Section  Received ERAS education outpatient: No  Consumed electrolyte-rich clear fluid prior to surgery: No  Received pre-operative Tylenol and Pepcid: Yes  Received Scopolamine patch: Yes  Received post-operative scheduled Motrin and Tylenol: Yes  Ziegler catheter discontinued 12 hours

## 2025-04-25 ENCOUNTER — HOSPITAL ENCOUNTER (INPATIENT)
Age: 30
LOS: 2 days | Discharge: HOME | End: 2025-04-27
Attending: STUDENT IN AN ORGANIZED HEALTH CARE EDUCATION/TRAINING PROGRAM | Admitting: STUDENT IN AN ORGANIZED HEALTH CARE EDUCATION/TRAINING PROGRAM
Payer: COMMERCIAL

## 2025-04-25 ENCOUNTER — ANESTHESIA (OUTPATIENT)
Dept: LABOR AND DELIVERY | Age: 30
End: 2025-04-25
Payer: COMMERCIAL

## 2025-04-25 DIAGNOSIS — G89.18 POST-OP PAIN: Primary | ICD-10-CM

## 2025-04-25 PROBLEM — O09.90 HIGH-RISK PREGNANCY, UNSPECIFIED TRIMESTER: Status: ACTIVE | Noted: 2025-04-25

## 2025-04-25 PROBLEM — Z98.891 H/O CESAREAN SECTION: Status: ACTIVE | Noted: 2025-04-25

## 2025-04-25 LAB
ABO + RH BLD: NORMAL
ALBUMIN SERPL-MCNC: 3.7 G/DL (ref 3.5–5.2)
ALBUMIN/GLOB SERPL: 1.1 {RATIO} (ref 1–2.5)
ALP SERPL-CCNC: 102 U/L (ref 35–104)
ALT SERPL-CCNC: 14 U/L (ref 10–35)
AMPHET UR QL SCN: NEGATIVE
ANION GAP SERPL CALCULATED.3IONS-SCNC: 12 MMOL/L (ref 9–16)
ARM BAND NUMBER: NORMAL
AST SERPL-CCNC: 15 U/L (ref 10–35)
BARBITURATES UR QL SCN: NEGATIVE
BENZODIAZ UR QL: NEGATIVE
BILIRUB SERPL-MCNC: 0.4 MG/DL (ref 0–1.2)
BLOOD BANK SAMPLE EXPIRATION: NORMAL
BLOOD GROUP ANTIBODIES SERPL: NEGATIVE
BUN SERPL-MCNC: 11 MG/DL (ref 6–20)
CALCIUM SERPL-MCNC: 9.1 MG/DL (ref 8.6–10.4)
CANNABINOIDS UR QL SCN: NEGATIVE
CHLORIDE SERPL-SCNC: 106 MMOL/L (ref 98–107)
CO2 SERPL-SCNC: 20 MMOL/L (ref 20–31)
COCAINE UR QL SCN: NEGATIVE
CREAT SERPL-MCNC: 0.8 MG/DL (ref 0.6–0.9)
CREAT UR-MCNC: 149 MG/DL (ref 28–217)
ERYTHROCYTE [DISTWIDTH] IN BLOOD BY AUTOMATED COUNT: 14.9 % (ref 11.8–14.4)
FENTANYL UR QL: NEGATIVE
GFR, ESTIMATED: >90 ML/MIN/1.73M2
GLUCOSE SERPL-MCNC: 83 MG/DL (ref 74–99)
HCT VFR BLD AUTO: 34.8 % (ref 36.3–47.1)
HGB BLD-MCNC: 11 G/DL (ref 11.9–15.1)
MCH RBC QN AUTO: 27.4 PG (ref 25.2–33.5)
MCHC RBC AUTO-ENTMCNC: 31.6 G/DL (ref 28.4–34.8)
MCV RBC AUTO: 86.8 FL (ref 82.6–102.9)
METHADONE UR QL: NEGATIVE
NRBC BLD-RTO: 0 PER 100 WBC
OPIATES UR QL SCN: NEGATIVE
OXYCODONE UR QL SCN: NEGATIVE
PCP UR QL SCN: NEGATIVE
PLATELET # BLD AUTO: 203 K/UL (ref 138–453)
PMV BLD AUTO: 11.4 FL (ref 8.1–13.5)
POTASSIUM SERPL-SCNC: 4 MMOL/L (ref 3.7–5.3)
PROT SERPL-MCNC: 7.1 G/DL (ref 6.6–8.7)
RBC # BLD AUTO: 4.01 M/UL (ref 3.95–5.11)
SODIUM SERPL-SCNC: 138 MMOL/L (ref 136–145)
T PALLIDUM AB SER QL IA: NONREACTIVE
TEST INFORMATION: NORMAL
TOTAL PROTEIN, URINE: 17 MG/DL
URINE TOTAL PROTEIN CREATININE RATIO: 0.11 (ref 0–0.2)
WBC OTHER # BLD: 7.8 K/UL (ref 3.5–11.3)

## 2025-04-25 PROCEDURE — 6370000000 HC RX 637 (ALT 250 FOR IP): Performed by: STUDENT IN AN ORGANIZED HEALTH CARE EDUCATION/TRAINING PROGRAM

## 2025-04-25 PROCEDURE — 88302 TISSUE EXAM BY PATHOLOGIST: CPT

## 2025-04-25 PROCEDURE — 7100000001 HC PACU RECOVERY - ADDTL 15 MIN: Performed by: STUDENT IN AN ORGANIZED HEALTH CARE EDUCATION/TRAINING PROGRAM

## 2025-04-25 PROCEDURE — 3700000001 HC ADD 15 MINUTES (ANESTHESIA): Performed by: STUDENT IN AN ORGANIZED HEALTH CARE EDUCATION/TRAINING PROGRAM

## 2025-04-25 PROCEDURE — 59510 CESAREAN DELIVERY: CPT | Performed by: STUDENT IN AN ORGANIZED HEALTH CARE EDUCATION/TRAINING PROGRAM

## 2025-04-25 PROCEDURE — 6360000002 HC RX W HCPCS

## 2025-04-25 PROCEDURE — 2580000003 HC RX 258: Performed by: NURSE ANESTHETIST, CERTIFIED REGISTERED

## 2025-04-25 PROCEDURE — 6360000002 HC RX W HCPCS: Performed by: ANESTHESIOLOGY

## 2025-04-25 PROCEDURE — 80053 COMPREHEN METABOLIC PANEL: CPT

## 2025-04-25 PROCEDURE — 2580000003 HC RX 258: Performed by: STUDENT IN AN ORGANIZED HEALTH CARE EDUCATION/TRAINING PROGRAM

## 2025-04-25 PROCEDURE — 86901 BLOOD TYPING SEROLOGIC RH(D): CPT

## 2025-04-25 PROCEDURE — 3700000000 HC ANESTHESIA ATTENDED CARE: Performed by: STUDENT IN AN ORGANIZED HEALTH CARE EDUCATION/TRAINING PROGRAM

## 2025-04-25 PROCEDURE — 6360000002 HC RX W HCPCS: Performed by: NURSE ANESTHETIST, CERTIFIED REGISTERED

## 2025-04-25 PROCEDURE — 59510 CESAREAN DELIVERY: CPT | Performed by: OBSTETRICS & GYNECOLOGY

## 2025-04-25 PROCEDURE — 0UT70ZZ RESECTION OF BILATERAL FALLOPIAN TUBES, OPEN APPROACH: ICD-10-PCS | Performed by: STUDENT IN AN ORGANIZED HEALTH CARE EDUCATION/TRAINING PROGRAM

## 2025-04-25 PROCEDURE — 85027 COMPLETE CBC AUTOMATED: CPT

## 2025-04-25 PROCEDURE — 3609079900 HC CESAREAN SECTION: Performed by: STUDENT IN AN ORGANIZED HEALTH CARE EDUCATION/TRAINING PROGRAM

## 2025-04-25 PROCEDURE — 86780 TREPONEMA PALLIDUM: CPT

## 2025-04-25 PROCEDURE — 7100000000 HC PACU RECOVERY - FIRST 15 MIN: Performed by: STUDENT IN AN ORGANIZED HEALTH CARE EDUCATION/TRAINING PROGRAM

## 2025-04-25 PROCEDURE — 6360000002 HC RX W HCPCS: Performed by: STUDENT IN AN ORGANIZED HEALTH CARE EDUCATION/TRAINING PROGRAM

## 2025-04-25 PROCEDURE — 1220000000 HC SEMI PRIVATE OB R&B

## 2025-04-25 PROCEDURE — 80307 DRUG TEST PRSMV CHEM ANLYZR: CPT

## 2025-04-25 PROCEDURE — 58611 LIGATE OVIDUCT(S) ADD-ON: CPT | Performed by: STUDENT IN AN ORGANIZED HEALTH CARE EDUCATION/TRAINING PROGRAM

## 2025-04-25 PROCEDURE — 86850 RBC ANTIBODY SCREEN: CPT

## 2025-04-25 PROCEDURE — 88307 TISSUE EXAM BY PATHOLOGIST: CPT

## 2025-04-25 PROCEDURE — 84156 ASSAY OF PROTEIN URINE: CPT

## 2025-04-25 PROCEDURE — 86900 BLOOD TYPING SEROLOGIC ABO: CPT

## 2025-04-25 PROCEDURE — 82570 ASSAY OF URINE CREATININE: CPT

## 2025-04-25 RX ORDER — ACETAMINOPHEN 500 MG
1000 TABLET ORAL EVERY 6 HOURS PRN
Qty: 60 TABLET | Refills: 1 | Status: SHIPPED | OUTPATIENT
Start: 2025-04-25

## 2025-04-25 RX ORDER — IBUPROFEN 600 MG/1
600 TABLET, FILM COATED ORAL EVERY 6 HOURS SCHEDULED
Status: DISCONTINUED | OUTPATIENT
Start: 2025-04-26 | End: 2025-04-27 | Stop reason: HOSPADM

## 2025-04-25 RX ORDER — SODIUM CHLORIDE 0.9 % (FLUSH) 0.9 %
5-40 SYRINGE (ML) INJECTION PRN
Status: DISCONTINUED | OUTPATIENT
Start: 2025-04-25 | End: 2025-04-27 | Stop reason: HOSPADM

## 2025-04-25 RX ORDER — MORPHINE SULFATE 1 MG/ML
INJECTION, SOLUTION EPIDURAL; INTRATHECAL; INTRAVENOUS
Status: COMPLETED | OUTPATIENT
Start: 2025-04-25 | End: 2025-04-25

## 2025-04-25 RX ORDER — VITAMIN A, ASCORBIC ACID, CHOLECALCIFEROL, .ALPHA.-TOCOPHEROL ACETATE, DL-, THIAMINE MONONITRATE, RIBOFLAVIN, NIACINAMIDE, PYRIDOXINE HYDROCHLORIDE, FOLIC ACID, CYANOCOBALAMIN, CALCIUM CARBONATE, IRON, ZINC OXIDE, AND CUPRIC OXIDE 4000; 120; 400; 22; 1.84; 3; 20; 10; 1; 12; 200; 29; 25; 2 [IU]/1; MG/1; [IU]/1; [IU]/1; MG/1; MG/1; MG/1; MG/1; MG/1; UG/1; MG/1; MG/1; MG/1; MG/1
1 TABLET ORAL DAILY
Status: DISCONTINUED | OUTPATIENT
Start: 2025-04-25 | End: 2025-04-27 | Stop reason: HOSPADM

## 2025-04-25 RX ORDER — SENNOSIDES A AND B 8.6 MG/1
1 TABLET, FILM COATED ORAL 2 TIMES DAILY
Qty: 60 TABLET | Refills: 1 | Status: SHIPPED | OUTPATIENT
Start: 2025-04-25 | End: 2025-06-24

## 2025-04-25 RX ORDER — SODIUM CHLORIDE 9 MG/ML
INJECTION, SOLUTION INTRAVENOUS PRN
Status: DISCONTINUED | OUTPATIENT
Start: 2025-04-25 | End: 2025-04-27 | Stop reason: HOSPADM

## 2025-04-25 RX ORDER — ALBUTEROL SULFATE 90 UG/1
2 INHALANT RESPIRATORY (INHALATION) EVERY 4 HOURS PRN
Status: DISCONTINUED | OUTPATIENT
Start: 2025-04-25 | End: 2025-04-27 | Stop reason: HOSPADM

## 2025-04-25 RX ORDER — ACETAMINOPHEN 500 MG
1000 TABLET ORAL EVERY 6 HOURS SCHEDULED
Status: DISCONTINUED | OUTPATIENT
Start: 2025-04-25 | End: 2025-04-27 | Stop reason: HOSPADM

## 2025-04-25 RX ORDER — ONDANSETRON 2 MG/ML
4 INJECTION INTRAMUSCULAR; INTRAVENOUS EVERY 6 HOURS PRN
Status: DISCONTINUED | OUTPATIENT
Start: 2025-04-25 | End: 2025-04-27 | Stop reason: HOSPADM

## 2025-04-25 RX ORDER — IBUPROFEN 600 MG/1
600 TABLET, FILM COATED ORAL EVERY 6 HOURS PRN
Qty: 40 TABLET | Refills: 1 | Status: SHIPPED | OUTPATIENT
Start: 2025-04-25

## 2025-04-25 RX ORDER — LANOLIN
CREAM (ML) TOPICAL
Status: DISCONTINUED | OUTPATIENT
Start: 2025-04-25 | End: 2025-04-27 | Stop reason: HOSPADM

## 2025-04-25 RX ORDER — CITRIC ACID/SODIUM CITRATE 334-500MG
30 SOLUTION, ORAL ORAL ONCE
Status: COMPLETED | OUTPATIENT
Start: 2025-04-25 | End: 2025-04-25

## 2025-04-25 RX ORDER — DOCUSATE SODIUM 100 MG/1
100 CAPSULE, LIQUID FILLED ORAL 2 TIMES DAILY
Status: DISCONTINUED | OUTPATIENT
Start: 2025-04-25 | End: 2025-04-27 | Stop reason: HOSPADM

## 2025-04-25 RX ORDER — FENTANYL CITRATE 50 UG/ML
INJECTION, SOLUTION INTRAMUSCULAR; INTRAVENOUS
Status: DISCONTINUED | OUTPATIENT
Start: 2025-04-25 | End: 2025-04-25 | Stop reason: SDUPTHER

## 2025-04-25 RX ORDER — SIMETHICONE 80 MG
80 TABLET,CHEWABLE ORAL 4 TIMES DAILY PRN
Qty: 90 TABLET | Refills: 1 | Status: SHIPPED | OUTPATIENT
Start: 2025-04-25

## 2025-04-25 RX ORDER — SODIUM CHLORIDE, SODIUM LACTATE, POTASSIUM CHLORIDE, CALCIUM CHLORIDE 600; 310; 30; 20 MG/100ML; MG/100ML; MG/100ML; MG/100ML
INJECTION, SOLUTION INTRAVENOUS CONTINUOUS
Status: DISCONTINUED | OUTPATIENT
Start: 2025-04-25 | End: 2025-04-26

## 2025-04-25 RX ORDER — KETOROLAC TROMETHAMINE 30 MG/ML
INJECTION, SOLUTION INTRAMUSCULAR; INTRAVENOUS
Status: DISCONTINUED | OUTPATIENT
Start: 2025-04-25 | End: 2025-04-25 | Stop reason: SDUPTHER

## 2025-04-25 RX ORDER — BISACODYL 10 MG
10 SUPPOSITORY, RECTAL RECTAL DAILY PRN
Status: DISCONTINUED | OUTPATIENT
Start: 2025-04-25 | End: 2025-04-27 | Stop reason: HOSPADM

## 2025-04-25 RX ORDER — SCOPOLAMINE 1 MG/3D
1 PATCH, EXTENDED RELEASE TRANSDERMAL ONCE
Status: DISCONTINUED | OUTPATIENT
Start: 2025-04-25 | End: 2025-04-25

## 2025-04-25 RX ORDER — DIPHENHYDRAMINE HYDROCHLORIDE 50 MG/ML
INJECTION, SOLUTION INTRAMUSCULAR; INTRAVENOUS
Status: DISCONTINUED | OUTPATIENT
Start: 2025-04-25 | End: 2025-04-25 | Stop reason: SDUPTHER

## 2025-04-25 RX ORDER — ONDANSETRON 2 MG/ML
INJECTION INTRAMUSCULAR; INTRAVENOUS
Status: DISCONTINUED | OUTPATIENT
Start: 2025-04-25 | End: 2025-04-25 | Stop reason: SDUPTHER

## 2025-04-25 RX ORDER — ACETAMINOPHEN 325 MG/1
975 TABLET ORAL ONCE
Status: COMPLETED | OUTPATIENT
Start: 2025-04-25 | End: 2025-04-25

## 2025-04-25 RX ORDER — CLINDAMYCIN PHOSPHATE 900 MG/50ML
900 INJECTION, SOLUTION INTRAVENOUS
Status: COMPLETED | OUTPATIENT
Start: 2025-04-25 | End: 2025-04-25

## 2025-04-25 RX ORDER — BUPIVACAINE HYDROCHLORIDE 7.5 MG/ML
INJECTION, SOLUTION INTRASPINAL
Status: COMPLETED | OUTPATIENT
Start: 2025-04-25 | End: 2025-04-25

## 2025-04-25 RX ORDER — BUDESONIDE AND FORMOTEROL FUMARATE DIHYDRATE 160; 4.5 UG/1; UG/1
2 AEROSOL RESPIRATORY (INHALATION)
Status: DISCONTINUED | OUTPATIENT
Start: 2025-04-25 | End: 2025-04-27 | Stop reason: HOSPADM

## 2025-04-25 RX ORDER — ENOXAPARIN SODIUM 100 MG/ML
1 INJECTION SUBCUTANEOUS DAILY
Status: DISCONTINUED | OUTPATIENT
Start: 2025-04-26 | End: 2025-04-27 | Stop reason: HOSPADM

## 2025-04-25 RX ORDER — SODIUM CHLORIDE 0.9 % (FLUSH) 0.9 %
10 SYRINGE (ML) INJECTION PRN
Status: DISCONTINUED | OUTPATIENT
Start: 2025-04-25 | End: 2025-04-25

## 2025-04-25 RX ORDER — KETOROLAC TROMETHAMINE 30 MG/ML
30 INJECTION, SOLUTION INTRAMUSCULAR; INTRAVENOUS EVERY 6 HOURS
Status: COMPLETED | OUTPATIENT
Start: 2025-04-25 | End: 2025-04-26

## 2025-04-25 RX ORDER — SODIUM CHLORIDE 0.9 % (FLUSH) 0.9 %
10 SYRINGE (ML) INJECTION EVERY 12 HOURS SCHEDULED
Status: DISCONTINUED | OUTPATIENT
Start: 2025-04-25 | End: 2025-04-25

## 2025-04-25 RX ORDER — PROCHLORPERAZINE EDISYLATE 5 MG/ML
10 INJECTION INTRAMUSCULAR; INTRAVENOUS ONCE
Status: COMPLETED | OUTPATIENT
Start: 2025-04-25 | End: 2025-04-25

## 2025-04-25 RX ORDER — ONDANSETRON 4 MG/1
4 TABLET, FILM COATED ORAL 3 TIMES DAILY PRN
Qty: 15 TABLET | Refills: 0 | Status: SHIPPED | OUTPATIENT
Start: 2025-04-25

## 2025-04-25 RX ORDER — ONDANSETRON 4 MG/1
4 TABLET, ORALLY DISINTEGRATING ORAL EVERY 8 HOURS PRN
Status: DISCONTINUED | OUTPATIENT
Start: 2025-04-25 | End: 2025-04-27 | Stop reason: HOSPADM

## 2025-04-25 RX ORDER — SIMETHICONE 80 MG
80 TABLET,CHEWABLE ORAL EVERY 6 HOURS PRN
Status: DISCONTINUED | OUTPATIENT
Start: 2025-04-25 | End: 2025-04-27 | Stop reason: HOSPADM

## 2025-04-25 RX ORDER — OXYCODONE HYDROCHLORIDE 5 MG/1
5 TABLET ORAL EVERY 6 HOURS PRN
Qty: 28 TABLET | Refills: 0 | Status: SHIPPED | OUTPATIENT
Start: 2025-04-25 | End: 2025-05-02

## 2025-04-25 RX ORDER — POLYETHYLENE GLYCOL 3350 17 G/17G
17 POWDER, FOR SOLUTION ORAL DAILY
Status: DISCONTINUED | OUTPATIENT
Start: 2025-04-25 | End: 2025-04-27 | Stop reason: HOSPADM

## 2025-04-25 RX ORDER — ONDANSETRON 2 MG/ML
4 INJECTION INTRAMUSCULAR; INTRAVENOUS EVERY 6 HOURS PRN
Status: DISCONTINUED | OUTPATIENT
Start: 2025-04-25 | End: 2025-04-25

## 2025-04-25 RX ORDER — DEXAMETHASONE SODIUM PHOSPHATE 10 MG/ML
INJECTION, SOLUTION INTRAMUSCULAR; INTRAVENOUS
Status: DISCONTINUED | OUTPATIENT
Start: 2025-04-25 | End: 2025-04-25 | Stop reason: SDUPTHER

## 2025-04-25 RX ORDER — SODIUM CHLORIDE, SODIUM LACTATE, POTASSIUM CHLORIDE, AND CALCIUM CHLORIDE .6; .31; .03; .02 G/100ML; G/100ML; G/100ML; G/100ML
1000 INJECTION, SOLUTION INTRAVENOUS ONCE
Status: DISCONTINUED | OUTPATIENT
Start: 2025-04-25 | End: 2025-04-25

## 2025-04-25 RX ORDER — METRONIDAZOLE 500 MG/1
500 TABLET ORAL EVERY 8 HOURS SCHEDULED
Status: DISCONTINUED | OUTPATIENT
Start: 2025-04-25 | End: 2025-04-27 | Stop reason: HOSPADM

## 2025-04-25 RX ORDER — SODIUM CHLORIDE, SODIUM LACTATE, POTASSIUM CHLORIDE, CALCIUM CHLORIDE 600; 310; 30; 20 MG/100ML; MG/100ML; MG/100ML; MG/100ML
INJECTION, SOLUTION INTRAVENOUS CONTINUOUS
Status: DISCONTINUED | OUTPATIENT
Start: 2025-04-25 | End: 2025-04-25

## 2025-04-25 RX ORDER — SODIUM CHLORIDE 0.9 % (FLUSH) 0.9 %
5-40 SYRINGE (ML) INJECTION EVERY 12 HOURS SCHEDULED
Status: DISCONTINUED | OUTPATIENT
Start: 2025-04-25 | End: 2025-04-27 | Stop reason: HOSPADM

## 2025-04-25 RX ORDER — OXYCODONE HYDROCHLORIDE 5 MG/1
5 TABLET ORAL EVERY 4 HOURS PRN
Status: DISCONTINUED | OUTPATIENT
Start: 2025-04-25 | End: 2025-04-27 | Stop reason: HOSPADM

## 2025-04-25 RX ORDER — DEXTROAMPHETAMINE SACCHARATE, AMPHETAMINE ASPARTATE, DEXTROAMPHETAMINE SULFATE AND AMPHETAMINE SULFATE 2.5; 2.5; 2.5; 2.5 MG/1; MG/1; MG/1; MG/1
15 TABLET ORAL DAILY
Refills: 0 | Status: DISCONTINUED | OUTPATIENT
Start: 2025-04-26 | End: 2025-04-26

## 2025-04-25 RX ORDER — OXYCODONE HYDROCHLORIDE 5 MG/1
10 TABLET ORAL EVERY 4 HOURS PRN
Status: DISCONTINUED | OUTPATIENT
Start: 2025-04-25 | End: 2025-04-27 | Stop reason: HOSPADM

## 2025-04-25 RX ADMIN — SODIUM CITRATE AND CITRIC ACID MONOHYDRATE 30 ML: 500; 334 SOLUTION ORAL at 12:33

## 2025-04-25 RX ADMIN — ACETAMINOPHEN 1000 MG: 500 TABLET ORAL at 18:37

## 2025-04-25 RX ADMIN — SODIUM CHLORIDE, POTASSIUM CHLORIDE, SODIUM LACTATE AND CALCIUM CHLORIDE: 600; 310; 30; 20 INJECTION, SOLUTION INTRAVENOUS at 13:43

## 2025-04-25 RX ADMIN — METRONIDAZOLE 500 MG: 500 TABLET ORAL at 22:06

## 2025-04-25 RX ADMIN — CLINDAMYCIN PHOSPHATE 900 MG: 900 INJECTION, SOLUTION INTRAVENOUS at 12:35

## 2025-04-25 RX ADMIN — PROCHLORPERAZINE EDISYLATE 10 MG: 5 INJECTION INTRAMUSCULAR; INTRAVENOUS at 17:27

## 2025-04-25 RX ADMIN — OXYTOCIN 909 ML/HR: 10 INJECTION, SOLUTION INTRAMUSCULAR; INTRAVENOUS at 13:32

## 2025-04-25 RX ADMIN — KETOROLAC TROMETHAMINE 30 MG: 30 INJECTION, SOLUTION INTRAMUSCULAR at 20:57

## 2025-04-25 RX ADMIN — ONDANSETRON 4 MG: 2 INJECTION, SOLUTION INTRAMUSCULAR; INTRAVENOUS at 13:01

## 2025-04-25 RX ADMIN — FENTANYL CITRATE 20 MCG: 50 INJECTION, SOLUTION INTRAMUSCULAR; INTRAVENOUS at 13:10

## 2025-04-25 RX ADMIN — DOCUSATE SODIUM 100 MG: 100 CAPSULE, LIQUID FILLED ORAL at 22:06

## 2025-04-25 RX ADMIN — BUPIVACAINE HYDROCHLORIDE IN DEXTROSE 1.6 MG: 7.5 INJECTION, SOLUTION SUBARACHNOID at 13:06

## 2025-04-25 RX ADMIN — SODIUM CHLORIDE, POTASSIUM CHLORIDE, SODIUM LACTATE AND CALCIUM CHLORIDE: 600; 310; 30; 20 INJECTION, SOLUTION INTRAVENOUS at 12:27

## 2025-04-25 RX ADMIN — GENTAMICIN 500 MG: 40 INJECTION, SOLUTION INTRAMUSCULAR; INTRAVENOUS at 13:20

## 2025-04-25 RX ADMIN — MORPHINE SULFATE 0.2 MG: 1 INJECTION, SOLUTION EPIDURAL; INTRATHECAL; INTRAVENOUS at 13:06

## 2025-04-25 RX ADMIN — SODIUM CHLORIDE, PRESERVATIVE FREE 20 MG: 5 INJECTION INTRAVENOUS at 12:45

## 2025-04-25 RX ADMIN — DIPHENHYDRAMINE HYDROCHLORIDE 12.5 MG: 50 INJECTION INTRAMUSCULAR; INTRAVENOUS at 14:08

## 2025-04-25 RX ADMIN — SODIUM CHLORIDE, SODIUM LACTATE, POTASSIUM CHLORIDE, AND CALCIUM CHLORIDE: .6; .31; .03; .02 INJECTION, SOLUTION INTRAVENOUS at 20:59

## 2025-04-25 RX ADMIN — ACETAMINOPHEN 975 MG: 325 TABLET ORAL at 12:33

## 2025-04-25 RX ADMIN — KETOROLAC TROMETHAMINE 30 MG: 30 INJECTION, SOLUTION INTRAMUSCULAR at 14:08

## 2025-04-25 RX ADMIN — DEXAMETHASONE SODIUM PHOSPHATE 4 MG: 10 INJECTION, SOLUTION INTRAMUSCULAR; INTRAVENOUS at 13:33

## 2025-04-25 RX ADMIN — PHENYLEPHRINE HYDROCHLORIDE 50 MCG/MIN: 10 INJECTION INTRAVENOUS at 13:12

## 2025-04-25 ASSESSMENT — PAIN DESCRIPTION - DESCRIPTORS: DESCRIPTORS: SORE;ACHING

## 2025-04-25 ASSESSMENT — PAIN DESCRIPTION - LOCATION: LOCATION: ABDOMEN

## 2025-04-25 ASSESSMENT — PAIN - FUNCTIONAL ASSESSMENT: PAIN_FUNCTIONAL_ASSESSMENT: ACTIVITIES ARE NOT PREVENTED

## 2025-04-25 ASSESSMENT — PAIN SCALES - GENERAL
PAINLEVEL_OUTOF10: 8
PAINLEVEL_OUTOF10: 6

## 2025-04-25 ASSESSMENT — PAIN DESCRIPTION - ORIENTATION: ORIENTATION: MID;LOWER

## 2025-04-25 NOTE — H&P
unspecified trimester 04/25/2025    GBS (group B Streptococcus carrier), +RV culture, currently pregnant 04/10/2025     Will need abx in labor  Susceptibility is pending       38 weeks gestation of pregnancy 03/24/2025    cHTN (no meds) 03/20/2025     Patient states she was on lisinopril and Lopressor for up to 9 months after her last delivery      Encounter for prophylactic surgery for risk factor related to malignant neoplasm of ovary 03/20/2025     Desires RRBS, form submitted 3/20/25      Moderate asthma without complication 03/20/2025    Elevated glucose tolerance test 03/20/2025     Elevated 1 hour glucose, unable to complete 3-hour glucose test      Hx iPTD at 36 weeks      Di/Di twins, FGR      History of pre-eclampsia 03/02/2025    Body mass index (BMI) of 50-59.9 in adult (HCC) 12/18/2024     NST 34 weeks  Late STARR first MFM appointment 4/8/2025.  Reach out to office to see if we could have her be seen sooner.      Attention deficit hyperactivity disorder (ADHD) 09/25/2024    RLTCS 5/24/19 F APG 8/9 Wt 4#9, F 8/9 Wt 5#15 05/24/2019    HSV-2 (herpes simplex virus 2) infection 11/19/2018     Valtrex 36 weeks        Hx C/S x2(G1) 04/10/2015     Desires repeat, scheduled w/ bhanu 4/25/25         Plan discussed with Dr. Gonzalez, who is agreeable.     Steroids given this admission: No    Risks, benefits, alternatives and possible complications have been discussed in detail with the patient. Admission, and post admission procedures and expectations were discussed in detail. All questions were answered.    Attending's Name: Dr. Bhanu Gonzalez, DO  Ob/Gyn Resident  4/25/2025, 12:17 PM

## 2025-04-25 NOTE — FLOWSHEET NOTE
Patient admitted to room 741 from Rec1 via bed.   Oriented to room and surroundings.  Plan of care reviewed.  Verbalized understanding.  Instructed on infant security and safe sleep practices.  Preventing falls education provided .The following handouts given: A New Beginning: Your Guide to Postpartum Care, Rounding, gs Security System,Babies Cry A lot, Safe Sleep, Security and Visitation Guidelines.   Call light placed within reach.

## 2025-04-25 NOTE — BRIEF OP NOTE
Department of Obstetrics and Gynecology  Obstetrical Brief Operative Report  [facility]    Patient: Laine Zhang   : 1995  MRN: [id number]       Acct: [id number]   Date of Procedure: 25    Pre-operative Diagnosis: 29 y.o. female  at 38w6d   Scheduled Repeat  w/ Risk Reducing Salpingectomy 2/2 cHTN on no medications  History of  x2, declines TOLAC  Asthma   Elv 1 hr GTT, 3 hr wnl   HSV-2   Anxiety  BMI 54      Post-operative Diagnosis:   Living Male Infant  Scheduled Repeat  w/ Risk Reducing Salpingectomy 2/2 cHTN on no medications  History of  x2, declines TOLAC  Asthma   Elv 1 hr GTT, 3 hr wnl   HSV-2   Anxiety  BMI 54      Procedure: repeat low transverse  section with risk reducing salpingectomy    Surgeon: Wanda Gonzalez DO   Assistant(s): Elizabeth Evans DO    Anesthesia: spinal with Duramorph    Information for the patient's :  Stephen Zhang [[id number]]   male   Birth Weight: 3.885 kg (8 lb 9 oz)  Information for the patient's :  Stephen Zhang [[id number]]        Findings:  Live Born 8 lb 9 oz male infant in cephalic presentation with Apgars of 8 at 1 minute and 9 at five minutes, normal appearing uterus tubes and ovaries   Estimated Blood Loss: pending immediately post-operatively  Total IV Fluids: 1250ml  Urine output: 50ml   Drains:  jay catheter  Specimens:  placenta sent to pathology, cord blood, and cord gases  Instrument and Sponge Count: Correct  Complications: none  Condition: Infant stable, transfer to General Care Nursery, Mother stable, transfer to post anesthesia recovery    Wanda Gonzalez DO  Ob/Gyn Resident  2025, 2:27 PM

## 2025-04-25 NOTE — ANESTHESIA PROCEDURE NOTES
Spinal Block    End time: 4/25/2025 1:10 PM  Reason for block: primary anesthetic  Staffing  Performed: resident/CRNA   Resident/CRNA: Mary Cruz APRN - CRNA  Performed by: Mary Cruz APRN - CRNA  Authorized by: Tania Martin MD    Spinal Block  Patient position: sitting  Prep: Betadine and site prepped and draped  Patient monitoring: continuous pulse ox and frequent blood pressure checks  Approach: midline  Location: L2/L3  Provider prep: mask and sterile gloves  Local infiltration: lidocaine  Needle  Needle type: pencil-tip   Needle gauge: 24 G  Assessment  Sensory level: T4  Swirl obtained: Yes  CSF: clear  Attempts: 1  Hemodynamics: stable

## 2025-04-25 NOTE — CARE COORDINATION
ANTEPARTUM NOTE    Elective surgical procedure [Z41.9]  History of  [Z98.891]  History of uterine scar from previous surgery [Z98.891]  High-risk pregnancy, unspecified trimester [O09.90]    Laine was admitted to L&D on 25 for scheduled repeat C/S due to cHTN no medication, w/ history of CS x2 declining TOLAC @ 38w6d    OB GYN Provider: Dr. Gonzalez    Will meet with patient after delivery to verify name/address/phone/insurance and discuss discharge planning.     Anticipate DC home 2 nights after vaginal delivery or 4 nights after C/S delivery as long as hemodynamically stable.

## 2025-04-25 NOTE — ANESTHESIA PRE PROCEDURE
glucose tolerance test R73.09    38 weeks gestation of pregnancy Z3A.38    GBS (group B Streptococcus carrier), +RV culture, currently pregnant O99.820    High-risk pregnancy, unspecified trimester O09.90       Past Medical History:        Diagnosis Date    Anemia     CHILDHOOD ONLY    Anxiety 2015    Back pain 2011    Depression 2015    HSV infection 2018    lesion on buttock, one and only time she had out break    Hypertension 2018    pre-eclampsia    Pre-eclampsia      delivery        Past Surgical History:        Procedure Laterality Date     SECTION Bilateral 2019    TWINS     SECTION, LOW TRANSVERSE  2015    DILATION AND CURETTAGE OF UTERUS  2019    EAB    INTRAUTERINE DEVICE INSERTION  2019       Social History:    Social History     Tobacco Use    Smoking status: Former     Types: Cigars    Smokeless tobacco: Never    Tobacco comments:     \"Stopped in \"  3/24/2025   Substance Use Topics    Alcohol use: Not Currently                                Counseling given: Not Answered  Tobacco comments: \"Stopped in \"  3/24/2025      Vital Signs (Current):   Vitals:    25 1111   BP: 117/74   Pulse: 79   Resp: 19                                              BP Readings from Last 3 Encounters:   25 117/74   25 129/72   25 137/80       NPO Status:                                                                                 BMI:   Wt Readings from Last 3 Encounters:   25 (!) 148.8 kg (328 lb)   25 (!) 147.9 kg (326 lb)   25 (!) 147.4 kg (325 lb)     There is no height or weight on file to calculate BMI.    CBC:   Lab Results   Component Value Date/Time    WBC 8.0 2025 02:55 PM    RBC 3.86 2025 02:55 PM    HGB 10.6 2025 02:55 PM    HCT 33.9 2025 02:55 PM    MCV 87.8 2025 02:55 PM    RDW 14.5 2025 02:55 PM     2025 02:55 PM       CMP:   Lab Results   Component

## 2025-04-25 NOTE — CONSULTS
Mom reports the baby hasn't  yet, but was given formula. She doesn't want to attempt a breastfeed until she has rested. Packet of breastfeeding information given. Encouraged her to call out for assistance as needed.

## 2025-04-25 NOTE — OP NOTE
Operative Note  Department of Obstetrics and Gynecology  [facility]     Patient: Laine Zhang   : 1995  MRN: [id number]       Acct: [id number]   PCP: Shakira Flores MD  Date of Procedure: 25    Pre-operative Diagnosis: 29 y.o. female  at 38w6d   Scheduled Repeat  w/ Risk Reducing Salpingectomy 2/2 cHTN on no medications  History of  x2, declines TOLAC  Asthma   Elv 1 hr GTT, 3 hr wnl   HSV-2   Anxiety  BMI 54       Post-operative Diagnosis:   Living Male Infant  Scheduled Repeat  w/ Risk Reducing Salpingectomy 2/2 cHTN on no medications  History of  x2, declines TOLAC  Asthma   Elv 1 hr GTT, 3 hr wnl   HSV-2   Anxiety  BMI 54       Procedure: repeat low transverse  section with risk reducing salpingectomy     Surgeon: Wanda Gonzalez DO   Assistant(s): Elizabeth Evans DO     Anesthesia: spinal with Duramorph    Indications: Laine Zhang is a 29 y.o.  at 38w6d who presents to Labor and Delivery for scheduled repeat CS 2/2 hx of CS x2, declines TOLAC in the setting of cHTN (no meds). Patient also has an increased risk of ovarian malignancy and desires bilateral risk reducing salpingectomy at time of     Procedure Details   The patient was seen pre-operatively. The risks, benefits, complications, treatment options, and expected outcomes were discussed with the patient. The patient concurred with the proposed plan, giving informed consent. The patient was taken to the Operating Room, identified as Laine Zhang and the procedure verified as  Delivery. A Time Out was held and the above information confirmed.     After spinal anesthesia, the patient was draped and prepped in the usual sterile manner. A Pfannenstiel incision was made and carried down through the subcutaneous tissue to the fascia using scalpel and bovie. Fascial incision was made and extended transversely using bovie electrocautery for sharp

## 2025-04-26 PROBLEM — G89.18 POST-OP PAIN: Status: RESOLVED | Noted: 2025-04-25 | Resolved: 2025-04-26

## 2025-04-26 PROBLEM — O09.90 HIGH-RISK PREGNANCY, UNSPECIFIED TRIMESTER: Status: RESOLVED | Noted: 2025-04-25 | Resolved: 2025-04-26

## 2025-04-26 PROBLEM — Z3A.38 38 WEEKS GESTATION OF PREGNANCY: Status: RESOLVED | Noted: 2025-03-24 | Resolved: 2025-04-26

## 2025-04-26 PROBLEM — Z98.891 H/O CESAREAN SECTION: Status: RESOLVED | Noted: 2025-04-25 | Resolved: 2025-04-26

## 2025-04-26 PROBLEM — Z40.02 ENCOUNTER FOR PROPHYLACTIC SURGERY FOR RISK FACTOR RELATED TO MALIGNANT NEOPLASM OF OVARY: Status: RESOLVED | Noted: 2025-03-20 | Resolved: 2025-04-26

## 2025-04-26 LAB
ERYTHROCYTE [DISTWIDTH] IN BLOOD BY AUTOMATED COUNT: 14.9 % (ref 11.8–14.4)
HCT VFR BLD AUTO: 31.4 % (ref 36.3–47.1)
HGB BLD-MCNC: 9.8 G/DL (ref 11.9–15.1)
MCH RBC QN AUTO: 27.5 PG (ref 25.2–33.5)
MCHC RBC AUTO-ENTMCNC: 31.2 G/DL (ref 28.4–34.8)
MCV RBC AUTO: 88.2 FL (ref 82.6–102.9)
NRBC BLD-RTO: 0 PER 100 WBC
PLATELET # BLD AUTO: 184 K/UL (ref 138–453)
PMV BLD AUTO: 11.4 FL (ref 8.1–13.5)
RBC # BLD AUTO: 3.56 M/UL (ref 3.95–5.11)
WBC OTHER # BLD: 9.2 K/UL (ref 3.5–11.3)

## 2025-04-26 PROCEDURE — 6370000000 HC RX 637 (ALT 250 FOR IP): Performed by: STUDENT IN AN ORGANIZED HEALTH CARE EDUCATION/TRAINING PROGRAM

## 2025-04-26 PROCEDURE — 85027 COMPLETE CBC AUTOMATED: CPT

## 2025-04-26 PROCEDURE — 2500000003 HC RX 250 WO HCPCS: Performed by: STUDENT IN AN ORGANIZED HEALTH CARE EDUCATION/TRAINING PROGRAM

## 2025-04-26 PROCEDURE — 6360000002 HC RX W HCPCS: Performed by: STUDENT IN AN ORGANIZED HEALTH CARE EDUCATION/TRAINING PROGRAM

## 2025-04-26 PROCEDURE — 6370000000 HC RX 637 (ALT 250 FOR IP)

## 2025-04-26 PROCEDURE — 36415 COLL VENOUS BLD VENIPUNCTURE: CPT

## 2025-04-26 PROCEDURE — 1220000000 HC SEMI PRIVATE OB R&B

## 2025-04-26 RX ORDER — DEXTROAMPHETAMINE SACCHARATE, AMPHETAMINE ASPARTATE, DEXTROAMPHETAMINE SULFATE AND AMPHETAMINE SULFATE 2.5; 2.5; 2.5; 2.5 MG/1; MG/1; MG/1; MG/1
15 TABLET ORAL 2 TIMES DAILY
Refills: 0 | Status: DISCONTINUED | OUTPATIENT
Start: 2025-04-26 | End: 2025-04-27 | Stop reason: HOSPADM

## 2025-04-26 RX ORDER — METRONIDAZOLE 500 MG/1
500 TABLET ORAL 2 TIMES DAILY
Qty: 2 TABLET | Refills: 0 | Status: SHIPPED | OUTPATIENT
Start: 2025-04-26 | End: 2025-04-27 | Stop reason: HOSPADM

## 2025-04-26 RX ORDER — DEXTROAMPHETAMINE SACCHARATE, AMPHETAMINE ASPARTATE, DEXTROAMPHETAMINE SULFATE AND AMPHETAMINE SULFATE 2.5; 2.5; 2.5; 2.5 MG/1; MG/1; MG/1; MG/1
15 TABLET ORAL 2 TIMES DAILY
Refills: 0 | Status: DISCONTINUED | OUTPATIENT
Start: 2025-04-26 | End: 2025-04-26

## 2025-04-26 RX ADMIN — KETOROLAC TROMETHAMINE 30 MG: 30 INJECTION, SOLUTION INTRAMUSCULAR at 09:15

## 2025-04-26 RX ADMIN — ACETAMINOPHEN 1000 MG: 500 TABLET ORAL at 01:20

## 2025-04-26 RX ADMIN — DEXTROAMPHETAMINE SACCHARATE, AMPHETAMINE ASPARTATE, DEXTROAMPHETAMINE SULFATE AND AMPHETAMINE SULFATE 15 MG: 2.5; 2.5; 2.5; 2.5 TABLET ORAL at 13:31

## 2025-04-26 RX ADMIN — SODIUM CHLORIDE, PRESERVATIVE FREE 10 ML: 5 INJECTION INTRAVENOUS at 04:43

## 2025-04-26 RX ADMIN — ACETAMINOPHEN 1000 MG: 500 TABLET ORAL at 09:15

## 2025-04-26 RX ADMIN — SODIUM CHLORIDE, PRESERVATIVE FREE 10 ML: 5 INJECTION INTRAVENOUS at 11:15

## 2025-04-26 RX ADMIN — IBUPROFEN 600 MG: 600 TABLET, FILM COATED ORAL at 22:13

## 2025-04-26 RX ADMIN — DOCUSATE SODIUM 100 MG: 100 CAPSULE, LIQUID FILLED ORAL at 22:14

## 2025-04-26 RX ADMIN — METRONIDAZOLE 500 MG: 500 TABLET ORAL at 06:20

## 2025-04-26 RX ADMIN — METRONIDAZOLE 500 MG: 500 TABLET ORAL at 13:31

## 2025-04-26 RX ADMIN — ACETAMINOPHEN 1000 MG: 500 TABLET ORAL at 22:12

## 2025-04-26 RX ADMIN — DEXTROAMPHETAMINE SACCHARATE, AMPHETAMINE ASPARTATE, DEXTROAMPHETAMINE SULFATE AND AMPHETAMINE SULFATE 15 MG: 2.5; 2.5; 2.5; 2.5 TABLET ORAL at 09:15

## 2025-04-26 RX ADMIN — KETOROLAC TROMETHAMINE 30 MG: 30 INJECTION, SOLUTION INTRAMUSCULAR at 16:00

## 2025-04-26 RX ADMIN — POLYETHYLENE GLYCOL 3350 17 G: 17 POWDER, FOR SOLUTION ORAL at 22:19

## 2025-04-26 RX ADMIN — OXYCODONE 5 MG: 5 TABLET ORAL at 22:18

## 2025-04-26 RX ADMIN — KETOROLAC TROMETHAMINE 30 MG: 30 INJECTION, SOLUTION INTRAMUSCULAR at 04:41

## 2025-04-26 RX ADMIN — DOCUSATE SODIUM 100 MG: 100 CAPSULE, LIQUID FILLED ORAL at 09:15

## 2025-04-26 RX ADMIN — METRONIDAZOLE 500 MG: 500 TABLET ORAL at 22:14

## 2025-04-26 RX ADMIN — SODIUM CHLORIDE, PRESERVATIVE FREE 10 ML: 5 INJECTION INTRAVENOUS at 22:14

## 2025-04-26 RX ADMIN — ACETAMINOPHEN 1000 MG: 500 TABLET ORAL at 16:00

## 2025-04-26 ASSESSMENT — PAIN SCALES - GENERAL
PAINLEVEL_OUTOF10: 6
PAINLEVEL_OUTOF10: 6
PAINLEVEL_OUTOF10: 7

## 2025-04-26 NOTE — LACTATION NOTE
Pt states she plans to exclusively pump and bottle feed.  Currently using 27mm, measures 24mm, encouraged to switch down with next pumping.  Pt takes Adderall and is concerned with safety and breastfeeding.  Reviewed [facility] information that Adderall is an L3 and compatible with breastfeeding if used as prescribed.  Has a breast pump being delivered tomorrow.

## 2025-04-26 NOTE — PROGRESS NOTES
POST OPERATIVE DAY # 2    Laine Zhang is a 29 y.o. female   This patient was seen and examined today.     Her pregnancy was complicated by:   Patient Active Problem List   Diagnosis    Anxiety    Former tobacco use    Hx C/S x3    HSV-2 (herpes simplex virus 2) infection    RLTCS w/ RRS 4/25/25 M Apg 8/9 Wt 8#9    Attention deficit hyperactivity disorder (ADHD)    Body mass index (BMI) of 50-59.9 in adult (HCC)    History of pre-eclampsia    cHTN (no meds)    Hx iPTD at 36 weeks    Moderate asthma without complication    Elevated glucose tolerance test    GBS (group B Streptococcus carrier), +RV culture, currently pregnant       Today she is doing well without any chief complaint. She denies chest pain, shortness of breath, headache, lightheadedness, and blurred vision. She is breast and bottle feeding and she denies any signs or symptoms of mastitis.  She is ambulating well. She is voiding without difficulty. Her lochia is light. Flatus absent.  Bowel movement absent. She is tolerating solids.  She currently denies S/S of postpartum depression.     Vital Signs:  Vitals:    04/26/25 0900 04/26/25 1632 04/26/25 2013 04/27/25 0315   BP: 105/66 130/74 135/78 (!) 144/80   Pulse:  64 63 76   Resp: 14 14 16 16   Temp: 98 °F (36.7 °C) 98.1 °F (36.7 °C) 98.1 °F (36.7 °C) 98.2 °F (36.8 °C)   TempSrc:  Oral Oral Oral   SpO2:   98%        Urine Input & Output last 24hrs:     Intake/Output Summary (Last 24 hours) at 4/27/2025 0624  Last data filed at 4/26/2025 0952  Gross per 24 hour   Intake --   Output 400 ml   Net -400 ml       Physical Exam:   General:  no apparent distress, alert, and cooperative  Neurologic:  alert, oriented, normal speech, no focal findings or movement disorder noted  Lungs:  No increased work of breathing or conversational dyspnea  Heart:  regular rate   Abdomen: abdomen soft, non-distended, non-tender  Fundus: non-tender, normal size, firm, below umbilicus  Incision: Prevena Dressing in place and

## 2025-04-26 NOTE — FLOWSHEET NOTE
Initiation of Electric Breast Pumping     Pumping Initiated at 0500    Initiated due to    []   Baby in NICU   [x]   Plans exclusive pumping   []   Infant weight loss(supplement)   []   Baby not latching well    Flange Size    Right:   Left:     []   24    []   24     [x]   27    [x]   27     []   30    []   30     []   36    []   36  Instructions   [x]   Verbal instructions on how to setup pump and how to use initiation phase   []   Written sheet\" How to keep your breast pump kit clean\"   []   Expectation sheet for Breastfeeding mothers with pumping log   [x]   Frequency of pumping   [x]   Collection,labeling and storage of colostrum and milk    Supplies Provided   [x]   Pump initiation kit   [x]   Cleaning supplies (basin and soap)   [x]   Additional flange size   [x]   Oral syringes/snappies   []   Patient labels       -

## 2025-04-26 NOTE — PROGRESS NOTES
CLINICAL PHARMACY NOTE: MEDS TO BEDS    Total # of Prescriptions Filled: 7   The following medications were delivered to the patient:  Gas relief 80 mg  Ondansetron 4 mg  Senna 8.6 mg  Metronidazole 500 mg  Acetaminophen 500 mg  Ibuprofen 600 mg  Oxycodone 5 mg    Additional Documentation:   Delivered to pt on 4/26 at 2:39. Pt had no copay.

## 2025-04-26 NOTE — CARE COORDINATION
CASE MANAGEMENT POST-PARTUM TRANSITIONAL CARE PLAN    Elective surgical procedure [Z41.9]  History of  [Z98.891]  History of uterine scar from previous surgery [Z98.891]  High-risk pregnancy, unspecified trimester [O09.90]    OB Provider: Dr Gonzalez    Writer met w/ Laine at her bedside to discuss DCP. She is S/P CS on 2025     to [facility]    Writer verified address, her phone number and emergency contacts are correct on facesheet. She lives with VELVET/tamra Wadsworth. She denied barriers with transportation home, to doctor's appointments or with paying for medications upon discharge home.     [facility] insurance correct primary with secondary [facility] Medicaid.  Mom states she thought the medicaid was ending but is still showing active.  She is going to add baby to both insurances if able. Writer notified Laine she has 30 days from date of birth to add  to insurance policies.  She will need to contact S to add baby to [employer].  She verbalized understanding.    Infant name on BC: Ambar.   Infant PCP undecided. List given.     DME: no  HOME CARE: no    Anticipated DC of mother and infant 2-4 days after CS delivery.    [facility] RISK OF UNPLANNED READMISSION 2.0             6 Total Score

## 2025-04-26 NOTE — PROGRESS NOTES
POST OPERATIVE DAY # 1    Laine Zhang is a 29 y.o. female   This patient was seen and examined today.     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 Her lochia is light. She denies chest pain, shortness of breath, headache, lightheadedness, and blurred vision. She is breast and bottle feeding and she denies any signs or symptoms of mastitis.  She is ambulating well. She is voiding without difficulty. She currently denies S/S of postpartum depression. Flatus absent.  Bowel movement absent. She is tolerating solids.    Vital Signs:  Vitals:    25 1844 25 0037 25 0441   BP:  138/79 125/86 132/85   Pulse: (!) 49 (!) 47 51 (!) 49   Resp: 16 16 16 16   Temp:  97.7 °F (36.5 °C) 98.2 °F (36.8 °C) 98.1 °F (36.7 °C)   TempSrc:  Oral Oral Oral   SpO2: 98% 97% 98%        Urine Input & Output last 24hrs:     Intake/Output Summary (Last 24 hours) at 2025 0612  Last data filed at 2025 0441  Gross per 24 hour   Intake 1999.63 ml   Output 1605 ml   Net 394.63 ml       Physical Exam:  General:  no apparent distress, alert, and cooperative  Neurologic:  alert, oriented, normal speech, no focal findings or movement disorder noted  Lungs:  No

## 2025-04-27 VITALS
SYSTOLIC BLOOD PRESSURE: 144 MMHG | DIASTOLIC BLOOD PRESSURE: 80 MMHG | HEART RATE: 76 BPM | RESPIRATION RATE: 16 BRPM | OXYGEN SATURATION: 98 % | TEMPERATURE: 98.2 F

## 2025-04-27 PROCEDURE — 6370000000 HC RX 637 (ALT 250 FOR IP): Performed by: STUDENT IN AN ORGANIZED HEALTH CARE EDUCATION/TRAINING PROGRAM

## 2025-04-27 PROCEDURE — 6370000000 HC RX 637 (ALT 250 FOR IP)

## 2025-04-27 RX ORDER — FERROUS SULFATE 325(65) MG
325 TABLET, DELAYED RELEASE (ENTERIC COATED) ORAL EVERY OTHER DAY
Qty: 30 TABLET | Refills: 1 | Status: SHIPPED | OUTPATIENT
Start: 2025-04-27

## 2025-04-27 RX ADMIN — ACETAMINOPHEN 1000 MG: 500 TABLET ORAL at 06:33

## 2025-04-27 RX ADMIN — DEXTROAMPHETAMINE SACCHARATE, AMPHETAMINE ASPARTATE, DEXTROAMPHETAMINE SULFATE AND AMPHETAMINE SULFATE 15 MG: 2.5; 2.5; 2.5; 2.5 TABLET ORAL at 08:59

## 2025-04-27 RX ADMIN — DOCUSATE SODIUM 100 MG: 100 CAPSULE, LIQUID FILLED ORAL at 08:59

## 2025-04-27 RX ADMIN — OXYCODONE 5 MG: 5 TABLET ORAL at 07:08

## 2025-04-27 RX ADMIN — OXYCODONE 5 MG: 5 TABLET ORAL at 03:13

## 2025-04-27 RX ADMIN — METRONIDAZOLE 500 MG: 500 TABLET ORAL at 06:33

## 2025-04-27 RX ADMIN — OXYCODONE 10 MG: 5 TABLET ORAL at 11:29

## 2025-04-27 RX ADMIN — IBUPROFEN 600 MG: 600 TABLET, FILM COATED ORAL at 06:33

## 2025-04-27 ASSESSMENT — PAIN SCALES - GENERAL
PAINLEVEL_OUTOF10: 6
PAINLEVEL_OUTOF10: 7
PAINLEVEL_OUTOF10: 7

## 2025-04-27 ASSESSMENT — PAIN DESCRIPTION - LOCATION: LOCATION: ABDOMEN;INCISION

## 2025-04-27 NOTE — PLAN OF CARE
Problem: Vaginal Birth or  Section  Goal: Fetal and maternal status remain reassuring during the birth process  Description:  Birth OB-Pregnancy care plan goal which identifies if the fetal and maternal status remain reassuring during the birth process  Outcome: Completed     Problem: Postpartum  Goal: Experiences normal postpartum course  Description:  Postpartum OB-Pregnancy care plan goal which identifies if the mother is experiencing a normal postpartum course  Outcome: Completed  Goal: Appropriate maternal -  bonding  Description:  Postpartum OB-Pregnancy care plan goal which identifies if the mother and  are bonding appropriately  Outcome: Completed  Goal: Establishment of infant feeding pattern  Description:  Postpartum OB-Pregnancy care plan goal which identifies if the mother is establishing a feeding pattern with their   Outcome: Completed  Goal: Incisions, wounds, or drain sites healing without S/S of infection  Outcome: Completed     Problem: Pain  Goal: Verbalizes/displays adequate comfort level or baseline comfort level  Outcome: Completed     Problem: Infection - Adult  Goal: Absence of infection at discharge  Outcome: Completed  Goal: Absence of fever/infection during anticipated neutropenic period  Outcome: Completed     Problem: Safety - Adult  Goal: Free from fall injury  Outcome: Completed     Problem: Discharge Planning  Goal: Discharge to home or other facility with appropriate resources  Outcome: Completed     Problem: Chronic Conditions and Co-morbidities  Goal: Patient's chronic conditions and co-morbidity symptoms are monitored and maintained or improved  Outcome: Completed

## 2025-04-27 NOTE — PROGRESS NOTES
CLINICAL PHARMACY NOTE: MEDS TO BEDS    Total # of Prescriptions Filled: 1   The following medications were delivered to the patient:  Ferosul 325 mg     Additional Documentation: dropped off to patient in room 741 on 4/27/25 at 10:15 am by lebron marinelli. No charge

## 2025-04-27 NOTE — CARE COORDINATION
Discharge Report    [facility]  Clinical Case Management Department  Written by: Linda Jones RN    Patient Name: Laine Zhang  Attending Provider: No att. providers found  Admit Date: 2025 10:44 AM  MRN: [id number]  Account: [id number]                     : 1995  Discharge Date: 2025      Disposition: home with infant    Linda Jones RN

## 2025-04-28 NOTE — ANESTHESIA POSTPROCEDURE EVALUATION
Department of Anesthesiology  Postprocedure Note    Patient: Laine Zhang  MRN: [id number]  YOB: 1995  Date of evaluation: 2025    Procedure Summary       Date: 25 Room / Location: [facility] OR [address] [place] [address]    Anesthesia Start: 1300 Anesthesia Stop: 1419    Procedure:  SECTION Diagnosis:       Elective surgical procedure      History of       History of uterine scar from previous surgery      (Elective surgical procedure [Z41.9])      (History of  [Z98.891])      (History of uterine scar from previous surgery [Z98.891])    Surgeons: Wanda Gonzalez DO Responsible Provider: Nico Alvarez MD    Anesthesia Type: spinal ASA Status: 3            Anesthesia Type: No value filed.    Cathie Phase I: Cathie Score: 10    Cathie Phase II:      Anesthesia Post Evaluation    Patient location during evaluation: bedside  Patient participation: complete - patient participated  Level of consciousness: awake and alert  Airway patency: patent  Nausea & Vomiting: no nausea and no vomiting  Cardiovascular status: hemodynamically stable  Respiratory status: acceptable  Hydration status: stable  Comments: BP (!) 144/80   Pulse 76   Temp 98.2 °F (36.8 °C) (Oral)   Resp 16   LMP 2024   SpO2 98%   Breastfeeding Unknown     Pain management: adequate    No notable events documented.

## 2025-04-29 LAB — SURGICAL PATHOLOGY REPORT: NORMAL

## 2025-05-02 ENCOUNTER — POSTPARTUM VISIT (OUTPATIENT)
Dept: OBGYN CLINIC | Age: 30
End: 2025-05-02

## 2025-05-02 VITALS
BODY MASS INDEX: 53.72 KG/M2 | DIASTOLIC BLOOD PRESSURE: 83 MMHG | WEIGHT: 293 LBS | SYSTOLIC BLOOD PRESSURE: 144 MMHG | HEART RATE: 75 BPM

## 2025-05-02 DIAGNOSIS — Z98.891 S/P REPEAT LOW TRANSVERSE C-SECTION: Primary | ICD-10-CM

## 2025-05-02 PROCEDURE — 0503F POSTPARTUM CARE VISIT: CPT | Performed by: STUDENT IN AN ORGANIZED HEALTH CARE EDUCATION/TRAINING PROGRAM

## 2025-05-02 RX ORDER — GABAPENTIN 300 MG/1
300 CAPSULE ORAL 2 TIMES DAILY
Qty: 60 CAPSULE | Refills: 0 | Status: SHIPPED | OUTPATIENT
Start: 2025-05-02 | End: 2025-06-01

## 2025-05-02 RX ORDER — FUROSEMIDE 20 MG/1
20 TABLET ORAL DAILY
Qty: 5 TABLET | Refills: 0 | Status: SHIPPED | OUTPATIENT
Start: 2025-05-02

## 2025-05-02 NOTE — PROGRESS NOTES
Postpartum Visit    Laine Zhang is a 29 y.o. female , 1 weeks Post Partum s/p RLTCS with risk-reducing salpingectomy    The patient was seen.  She is experiencing some of the burning around her incision from nerve pain.    She is  breast feeding and denies signs or symptoms of mastitis.  The patient completed the E.P.D.S. Post Partum Depression Evaluation form and EPDS Score of 0. She does not have signs or symptoms of post partum depression. She denies any suicidal thoughts with a plan, intent to harm others, and delusional ideas. She does admit to having good home support. Today her lochia is light she denies any dizziness or shortness of breath.  Her bowels are regular and she denies any urinary tract symptomology.    Her pregnancy was complicated by:  Patient Active Problem List    Diagnosis Date Noted    Anxiety 2015     Priority: Medium    Former tobacco use 2015     Priority: Low     Overview Note:     Quit early in pregnancy      GBS (group B Streptococcus carrier), +RV culture, currently pregnant 04/10/2025     Overview Note:     Will need abx in labor  Susceptibility is pending       cHTN (no meds) 2025     Overview Note:     Patient states she was on lisinopril and Lopressor for up to 9 months after her last delivery      Moderate asthma without complication 2025    Elevated glucose tolerance test 2025     Overview Note:     Elevated 1 hour glucose, unable to complete 3-hour glucose test      Hx iPTD at 36 weeks      Overview Note:     Di/Di twins, FGR      History of pre-eclampsia 2025    Body mass index (BMI) of 50-59.9 in adult ([facility]) 2024     Overview Note:     NST 34 weeks  Late [facility] first MFM appointment 2025.  Reach out to office to see if we could have her be seen sooner.      Attention deficit hyperactivity disorder (ADHD) 2024    RLTCS w/ RRS 25 M Apg / Wt 8#9 2019    HSV-2 (herpes simplex virus 2) infection 2018

## 2025-05-05 RX ORDER — FUROSEMIDE 20 MG/1
20 TABLET ORAL DAILY
Qty: 5 TABLET | Refills: 0 | Status: SHIPPED | OUTPATIENT
Start: 2025-05-05

## 2025-05-28 RX ORDER — GABAPENTIN 300 MG/1
300 CAPSULE ORAL 2 TIMES DAILY
Qty: 60 CAPSULE | Refills: 0 | Status: SHIPPED | OUTPATIENT
Start: 2025-05-28 | End: 2025-06-27

## 2025-06-04 RX ORDER — FUROSEMIDE 20 MG/1
20 TABLET ORAL DAILY
Qty: 5 TABLET | Refills: 0 | OUTPATIENT
Start: 2025-06-04

## 2025-06-07 NOTE — PROGRESS NOTES
weeks        Hx C/S x3 04/10/2015     Overview Note:     Desires repeat, scheduled w/ bhanu 25         She does admit to having good home support. Her bowels are regular and she denies any urinary tract symptomology.    OB History    Para Term  AB Living   5 4 2 1 1 4   SAB IAB Ectopic Molar Multiple Live Births   0 0 0 0 1 4           Blood pressure 134/83, pulse 70, height 1.651 m (5' 5\"), weight 136.1 kg (300 lb), last menstrual period 2024, currently breastfeeding.    Abdomen: Soft and non-tender; good bowel sounds; no guarding, rebound or rigidity; no CVA tenderness bilaterally.    Incision: Clean, Dry and Intact without signs or symptoms of infection.    Extremities: No calf tenderness bilaterally. DTR 2/4 bilaterally. No edema.      Assessment:   Diagnosis Orders   1. Postpartum care following  delivery          Chief Complaint   Patient presents with    Postpartum Care     EPDS Score of 0        Plan:  1. Signs & Symptoms of mastitis reviewed; notify if occurs  2. Secondary smoke risks reviewed. Increased risks of respiratory problems, Sudden infant death syndrome, and potential malignancies.  3. Family planning counseling and STD counseling completed.  rrBS  4. Pt to follow up for annual exam in 11 months    Patient was seen with total face to face time of 15 minutes. More than 50% of this visit was on counseling and education regarding her    Diagnosis Orders   1. Postpartum care following  delivery         and her options. She was also counseled on her preventative health maintenance recommendations and follow-up.     Milady Land MD

## 2025-06-10 ENCOUNTER — POSTPARTUM VISIT (OUTPATIENT)
Dept: OBGYN CLINIC | Age: 30
End: 2025-06-10

## 2025-06-10 VITALS
HEART RATE: 70 BPM | BODY MASS INDEX: 48.82 KG/M2 | SYSTOLIC BLOOD PRESSURE: 134 MMHG | DIASTOLIC BLOOD PRESSURE: 83 MMHG | HEIGHT: 65 IN | WEIGHT: 293 LBS

## 2025-06-10 PROCEDURE — 0503F POSTPARTUM CARE VISIT: CPT | Performed by: OBSTETRICS & GYNECOLOGY

## 2025-06-25 RX ORDER — GABAPENTIN 300 MG/1
CAPSULE ORAL
Qty: 60 CAPSULE | Refills: 0 | OUTPATIENT
Start: 2025-06-25

## (undated) DEVICE — KENDALL SCD EXPRESS SLEEVES, KNEE LENGTH, MEDIUM: Brand: KENDALL SCD

## (undated) DEVICE — STERILE POLYISOPRENE POWDER-FREE SURGICAL GLOVES WITH EMOLLIENT COATING: Brand: PROTEXIS

## (undated) DEVICE — SOLUTION SOD CHL 0.9% 1000ML

## (undated) DEVICE — Z DUP USE 2522782 SOLUTION IRRIG 1000ML STRL H2O PLAS CONTAINER UROMATIC

## (undated) DEVICE — STAPLER SKIN L39MM DIA0.53MM CRWN 5.7MM S STL FIX HD PROX

## (undated) DEVICE — SUTURE MCRYL SZ 0 L36IN ABSRB VLT L48MM CTX 1/2 CIR Y398H

## (undated) DEVICE — TOWEL SURG W16XL26IN WHT NONFENESTRATED ST 2 PER PK

## (undated) DEVICE — SUTURE VCRL 3-0 L36IN ABSRB VLT CT-1 L36MM 1/2 CIR J344H